# Patient Record
Sex: MALE | Race: WHITE | NOT HISPANIC OR LATINO | URBAN - METROPOLITAN AREA
[De-identification: names, ages, dates, MRNs, and addresses within clinical notes are randomized per-mention and may not be internally consistent; named-entity substitution may affect disease eponyms.]

---

## 2020-04-02 ENCOUNTER — INPATIENT (INPATIENT)
Facility: HOSPITAL | Age: 80
LOS: 21 days | Discharge: HOME | DRG: 871 | End: 2020-04-24
Attending: INTERNAL MEDICINE | Admitting: INTERNAL MEDICINE
Payer: MEDICARE

## 2020-04-02 VITALS
DIASTOLIC BLOOD PRESSURE: 71 MMHG | RESPIRATION RATE: 28 BRPM | OXYGEN SATURATION: 86 % | SYSTOLIC BLOOD PRESSURE: 116 MMHG | HEIGHT: 74 IN | WEIGHT: 195.11 LBS | TEMPERATURE: 98 F | HEART RATE: 130 BPM

## 2020-04-02 LAB
ALBUMIN SERPL ELPH-MCNC: 3.1 G/DL — LOW (ref 3.3–5)
ALP SERPL-CCNC: 47 U/L — SIGNIFICANT CHANGE UP (ref 40–120)
ALT FLD-CCNC: 50 U/L — HIGH (ref 10–45)
ANION GAP SERPL CALC-SCNC: 17 MMOL/L — SIGNIFICANT CHANGE UP (ref 5–17)
ANISOCYTOSIS BLD QL: SLIGHT — SIGNIFICANT CHANGE UP
APPEARANCE UR: CLEAR — SIGNIFICANT CHANGE UP
APTT BLD: 33.8 SEC — SIGNIFICANT CHANGE UP (ref 27.5–36.3)
AST SERPL-CCNC: 73 U/L — HIGH (ref 10–40)
BACTERIA # UR AUTO: PRESENT /HPF
BASE EXCESS BLDA CALC-SCNC: -4.6 MMOL/L — LOW (ref -2–3)
BASE EXCESS BLDV CALC-SCNC: -1.2 MMOL/L — SIGNIFICANT CHANGE UP
BASOPHILS # BLD AUTO: 0.01 K/UL — SIGNIFICANT CHANGE UP (ref 0–0.2)
BASOPHILS NFR BLD AUTO: 0.2 % — SIGNIFICANT CHANGE UP (ref 0–2)
BILIRUB SERPL-MCNC: 0.4 MG/DL — SIGNIFICANT CHANGE UP (ref 0.2–1.2)
BILIRUB UR-MCNC: NEGATIVE — SIGNIFICANT CHANGE UP
BUN SERPL-MCNC: 17 MG/DL — SIGNIFICANT CHANGE UP (ref 7–23)
BURR CELLS BLD QL SMEAR: PRESENT — SIGNIFICANT CHANGE UP
CALCIUM SERPL-MCNC: 8.6 MG/DL — SIGNIFICANT CHANGE UP (ref 8.4–10.5)
CHLORIDE SERPL-SCNC: 96 MMOL/L — SIGNIFICANT CHANGE UP (ref 96–108)
CO2 SERPL-SCNC: 20 MMOL/L — LOW (ref 22–31)
COLOR SPEC: YELLOW — SIGNIFICANT CHANGE UP
COMMENT - URINE: SIGNIFICANT CHANGE UP
CREAT SERPL-MCNC: 1.26 MG/DL — SIGNIFICANT CHANGE UP (ref 0.5–1.3)
CRP SERPL-MCNC: 29.04 MG/DL — HIGH (ref 0–0.4)
D DIMER BLD IA.RAPID-MCNC: 486 NG/ML DDU — HIGH
DIFF PNL FLD: ABNORMAL
EOSINOPHIL # BLD AUTO: 0 K/UL — SIGNIFICANT CHANGE UP (ref 0–0.5)
EOSINOPHIL NFR BLD AUTO: 0 % — SIGNIFICANT CHANGE UP (ref 0–6)
EPI CELLS # UR: SIGNIFICANT CHANGE UP /HPF (ref 0–5)
FERRITIN SERPL-MCNC: 644 NG/ML — HIGH (ref 30–400)
GLUCOSE BLDC GLUCOMTR-MCNC: 136 MG/DL — HIGH (ref 70–99)
GLUCOSE BLDC GLUCOMTR-MCNC: 158 MG/DL — HIGH (ref 70–99)
GLUCOSE BLDC GLUCOMTR-MCNC: 203 MG/DL — HIGH (ref 70–99)
GLUCOSE SERPL-MCNC: 127 MG/DL — HIGH (ref 70–99)
GLUCOSE UR QL: NEGATIVE — SIGNIFICANT CHANGE UP
HCO3 BLDA-SCNC: 21 MMOL/L — SIGNIFICANT CHANGE UP (ref 21–28)
HCO3 BLDV-SCNC: 22 MMOL/L — SIGNIFICANT CHANGE UP (ref 20–27)
HCT VFR BLD CALC: 41.1 % — SIGNIFICANT CHANGE UP (ref 39–50)
HGB BLD-MCNC: 13.9 G/DL — SIGNIFICANT CHANGE UP (ref 13–17)
HYALINE CASTS # UR AUTO: SIGNIFICANT CHANGE UP /LPF (ref 0–2)
IMM GRANULOCYTES NFR BLD AUTO: 0.8 % — SIGNIFICANT CHANGE UP (ref 0–1.5)
INR BLD: 1.39 — HIGH (ref 0.88–1.16)
KETONES UR-MCNC: ABNORMAL MG/DL
LEUKOCYTE ESTERASE UR-ACNC: NEGATIVE — SIGNIFICANT CHANGE UP
LYMPHOCYTES # BLD AUTO: 0.48 K/UL — LOW (ref 1–3.3)
LYMPHOCYTES # BLD AUTO: 7.8 % — LOW (ref 13–44)
MANUAL SMEAR VERIFICATION: SIGNIFICANT CHANGE UP
MCHC RBC-ENTMCNC: 29 PG — SIGNIFICANT CHANGE UP (ref 27–34)
MCHC RBC-ENTMCNC: 33.8 GM/DL — SIGNIFICANT CHANGE UP (ref 32–36)
MCV RBC AUTO: 85.8 FL — SIGNIFICANT CHANGE UP (ref 80–100)
MONOCYTES # BLD AUTO: 0.55 K/UL — SIGNIFICANT CHANGE UP (ref 0–0.9)
MONOCYTES NFR BLD AUTO: 8.9 % — SIGNIFICANT CHANGE UP (ref 2–14)
NEUTROPHILS # BLD AUTO: 5.09 K/UL — SIGNIFICANT CHANGE UP (ref 1.8–7.4)
NEUTROPHILS NFR BLD AUTO: 82.3 % — HIGH (ref 43–77)
NITRITE UR-MCNC: NEGATIVE — SIGNIFICANT CHANGE UP
NRBC # BLD: 0 /100 WBCS — SIGNIFICANT CHANGE UP (ref 0–0)
OVALOCYTES BLD QL SMEAR: SLIGHT — SIGNIFICANT CHANGE UP
PCO2 BLDA: 43 MMHG — SIGNIFICANT CHANGE UP (ref 35–48)
PCO2 BLDV: 34 MMHG — LOW (ref 41–51)
PH BLDA: 7.32 — LOW (ref 7.35–7.45)
PH BLDV: 7.43 — SIGNIFICANT CHANGE UP (ref 7.32–7.43)
PH UR: 6 — SIGNIFICANT CHANGE UP (ref 5–8)
PLAT MORPH BLD: NORMAL — SIGNIFICANT CHANGE UP
PLATELET # BLD AUTO: 131 K/UL — LOW (ref 150–400)
PO2 BLDA: 100 MMHG — SIGNIFICANT CHANGE UP (ref 83–108)
PO2 BLDV: 20 MMHG — SIGNIFICANT CHANGE UP
POTASSIUM SERPL-MCNC: 4 MMOL/L — SIGNIFICANT CHANGE UP (ref 3.5–5.3)
POTASSIUM SERPL-SCNC: 4 MMOL/L — SIGNIFICANT CHANGE UP (ref 3.5–5.3)
PROCALCITONIN SERPL-MCNC: 1.85 NG/ML — HIGH (ref 0.02–0.1)
PROT SERPL-MCNC: 7.5 G/DL — SIGNIFICANT CHANGE UP (ref 6–8.3)
PROT UR-MCNC: >=300 MG/DL
PROTHROM AB SERPL-ACNC: 16 SEC — HIGH (ref 10–12.9)
RBC # BLD: 4.79 M/UL — SIGNIFICANT CHANGE UP (ref 4.2–5.8)
RBC # FLD: 14.5 % — SIGNIFICANT CHANGE UP (ref 10.3–14.5)
RBC BLD AUTO: ABNORMAL
RBC CASTS # UR COMP ASSIST: < 5 /HPF — SIGNIFICANT CHANGE UP
SAO2 % BLDA: 98 % — SIGNIFICANT CHANGE UP (ref 95–100)
SAO2 % BLDV: 32 % — SIGNIFICANT CHANGE UP
SARS-COV-2 RNA SPEC QL NAA+PROBE: DETECTED
SODIUM SERPL-SCNC: 133 MMOL/L — LOW (ref 135–145)
SP GR SPEC: >=1.03 — SIGNIFICANT CHANGE UP (ref 1–1.03)
UROBILINOGEN FLD QL: 0.2 E.U./DL — SIGNIFICANT CHANGE UP
WBC # BLD: 6.18 K/UL — SIGNIFICANT CHANGE UP (ref 3.8–10.5)
WBC # FLD AUTO: 6.18 K/UL — SIGNIFICANT CHANGE UP (ref 3.8–10.5)
WBC UR QL: < 5 /HPF — SIGNIFICANT CHANGE UP

## 2020-04-02 PROCEDURE — 71045 X-RAY EXAM CHEST 1 VIEW: CPT | Mod: 26

## 2020-04-02 PROCEDURE — 93010 ELECTROCARDIOGRAM REPORT: CPT

## 2020-04-02 PROCEDURE — 93010 ELECTROCARDIOGRAM REPORT: CPT | Mod: XU

## 2020-04-02 PROCEDURE — 99291 CRITICAL CARE FIRST HOUR: CPT | Mod: 25

## 2020-04-02 PROCEDURE — 71045 X-RAY EXAM CHEST 1 VIEW: CPT | Mod: 26,77

## 2020-04-02 PROCEDURE — 31500 INSERT EMERGENCY AIRWAY: CPT

## 2020-04-02 RX ORDER — MIDAZOLAM HYDROCHLORIDE 1 MG/ML
5 INJECTION, SOLUTION INTRAMUSCULAR; INTRAVENOUS ONCE
Refills: 0 | Status: DISCONTINUED | OUTPATIENT
Start: 2020-04-02 | End: 2020-04-02

## 2020-04-02 RX ORDER — NOREPINEPHRINE BITARTRATE/D5W 8 MG/250ML
0.05 PLASTIC BAG, INJECTION (ML) INTRAVENOUS
Qty: 8 | Refills: 0 | Status: DISCONTINUED | OUTPATIENT
Start: 2020-04-02 | End: 2020-04-04

## 2020-04-02 RX ORDER — DEXMEDETOMIDINE HYDROCHLORIDE IN 0.9% SODIUM CHLORIDE 4 UG/ML
0.2 INJECTION INTRAVENOUS
Qty: 200 | Refills: 0 | Status: DISCONTINUED | OUTPATIENT
Start: 2020-04-02 | End: 2020-04-04

## 2020-04-02 RX ORDER — AZITHROMYCIN 500 MG/1
500 TABLET, FILM COATED ORAL ONCE
Refills: 0 | Status: COMPLETED | OUTPATIENT
Start: 2020-04-02 | End: 2020-04-02

## 2020-04-02 RX ORDER — ASCORBIC ACID 60 MG
1500 TABLET,CHEWABLE ORAL ONCE
Refills: 0 | Status: DISCONTINUED | OUTPATIENT
Start: 2020-04-02 | End: 2020-04-02

## 2020-04-02 RX ORDER — THIAMINE MONONITRATE (VIT B1) 100 MG
200 TABLET ORAL
Refills: 0 | Status: DISCONTINUED | OUTPATIENT
Start: 2020-04-02 | End: 2020-04-02

## 2020-04-02 RX ORDER — PANTOPRAZOLE SODIUM 20 MG/1
40 TABLET, DELAYED RELEASE ORAL DAILY
Refills: 0 | Status: DISCONTINUED | OUTPATIENT
Start: 2020-04-02 | End: 2020-04-04

## 2020-04-02 RX ORDER — INSULIN LISPRO 100/ML
VIAL (ML) SUBCUTANEOUS EVERY 6 HOURS
Refills: 0 | Status: DISCONTINUED | OUTPATIENT
Start: 2020-04-02 | End: 2020-04-05

## 2020-04-02 RX ORDER — ASCORBIC ACID 60 MG
1500 TABLET,CHEWABLE ORAL ONCE
Refills: 0 | Status: COMPLETED | OUTPATIENT
Start: 2020-04-02 | End: 2020-04-02

## 2020-04-02 RX ORDER — SUCCINYLCHOLINE CHLORIDE 100 MG/5ML
100 SYRINGE (ML) INTRAVENOUS ONCE
Refills: 0 | Status: COMPLETED | OUTPATIENT
Start: 2020-04-02 | End: 2020-04-02

## 2020-04-02 RX ORDER — TOCILIZUMAB 20 MG/ML
400 INJECTION, SOLUTION, CONCENTRATE INTRAVENOUS ONCE
Refills: 0 | Status: COMPLETED | OUTPATIENT
Start: 2020-04-02 | End: 2020-04-02

## 2020-04-02 RX ORDER — FENTANYL CITRATE 50 UG/ML
100 INJECTION INTRAVENOUS
Refills: 0 | Status: DISCONTINUED | OUTPATIENT
Start: 2020-04-02 | End: 2020-04-02

## 2020-04-02 RX ORDER — CHLORHEXIDINE GLUCONATE 213 G/1000ML
1 SOLUTION TOPICAL
Refills: 0 | Status: DISCONTINUED | OUTPATIENT
Start: 2020-04-02 | End: 2020-04-09

## 2020-04-02 RX ORDER — CHLORHEXIDINE GLUCONATE 213 G/1000ML
15 SOLUTION TOPICAL EVERY 12 HOURS
Refills: 0 | Status: DISCONTINUED | OUTPATIENT
Start: 2020-04-02 | End: 2020-04-04

## 2020-04-02 RX ORDER — THIAMINE MONONITRATE (VIT B1) 100 MG
200 TABLET ORAL ONCE
Refills: 0 | Status: COMPLETED | OUTPATIENT
Start: 2020-04-02 | End: 2020-04-02

## 2020-04-02 RX ORDER — FENTANYL CITRATE 50 UG/ML
50 INJECTION INTRAVENOUS
Refills: 0 | Status: DISCONTINUED | OUTPATIENT
Start: 2020-04-02 | End: 2020-04-02

## 2020-04-02 RX ORDER — PROPOFOL 10 MG/ML
100 INJECTION, EMULSION INTRAVENOUS ONCE
Refills: 0 | Status: COMPLETED | OUTPATIENT
Start: 2020-04-02 | End: 2020-04-02

## 2020-04-02 RX ORDER — ASCORBIC ACID 60 MG
1500 TABLET,CHEWABLE ORAL
Refills: 0 | Status: DISCONTINUED | OUTPATIENT
Start: 2020-04-02 | End: 2020-04-02

## 2020-04-02 RX ORDER — CEFTRIAXONE 500 MG/1
1000 INJECTION, POWDER, FOR SOLUTION INTRAMUSCULAR; INTRAVENOUS ONCE
Refills: 0 | Status: COMPLETED | OUTPATIENT
Start: 2020-04-02 | End: 2020-04-02

## 2020-04-02 RX ORDER — THIAMINE MONONITRATE (VIT B1) 100 MG
200 TABLET ORAL ONCE
Refills: 0 | Status: DISCONTINUED | OUTPATIENT
Start: 2020-04-02 | End: 2020-04-02

## 2020-04-02 RX ORDER — ETOMIDATE 2 MG/ML
20 INJECTION INTRAVENOUS ONCE
Refills: 0 | Status: COMPLETED | OUTPATIENT
Start: 2020-04-02 | End: 2020-04-02

## 2020-04-02 RX ORDER — PROPOFOL 10 MG/ML
9.42 INJECTION, EMULSION INTRAVENOUS
Qty: 1000 | Refills: 0 | Status: DISCONTINUED | OUTPATIENT
Start: 2020-04-02 | End: 2020-04-04

## 2020-04-02 RX ORDER — ENOXAPARIN SODIUM 100 MG/ML
80 INJECTION SUBCUTANEOUS EVERY 12 HOURS
Refills: 0 | Status: DISCONTINUED | OUTPATIENT
Start: 2020-04-02 | End: 2020-04-23

## 2020-04-02 RX ORDER — ACETAMINOPHEN 500 MG
650 TABLET ORAL EVERY 6 HOURS
Refills: 0 | Status: DISCONTINUED | OUTPATIENT
Start: 2020-04-02 | End: 2020-04-03

## 2020-04-02 RX ADMIN — CHLORHEXIDINE GLUCONATE 15 MILLILITER(S): 213 SOLUTION TOPICAL at 18:59

## 2020-04-02 RX ADMIN — Medication 102 MILLIGRAM(S): at 11:00

## 2020-04-02 RX ADMIN — FENTANYL CITRATE 100 MICROGRAM(S): 50 INJECTION INTRAVENOUS at 17:00

## 2020-04-02 RX ADMIN — Medication 40 MILLIGRAM(S): at 18:59

## 2020-04-02 RX ADMIN — TOCILIZUMAB 100 MILLIGRAM(S): 20 INJECTION, SOLUTION, CONCENTRATE INTRAVENOUS at 17:00

## 2020-04-02 RX ADMIN — Medication 8.3 MICROGRAM(S)/KG/MIN: at 20:27

## 2020-04-02 RX ADMIN — DEXMEDETOMIDINE HYDROCHLORIDE IN 0.9% SODIUM CHLORIDE 4.43 MICROGRAM(S)/KG/HR: 4 INJECTION INTRAVENOUS at 19:32

## 2020-04-02 RX ADMIN — PROPOFOL 100 MILLIGRAM(S): 10 INJECTION, EMULSION INTRAVENOUS at 11:30

## 2020-04-02 RX ADMIN — PROPOFOL 5 MICROGRAM(S)/KG/MIN: 10 INJECTION, EMULSION INTRAVENOUS at 10:48

## 2020-04-02 RX ADMIN — Medication 200 MILLIGRAM(S): at 11:45

## 2020-04-02 RX ADMIN — Medication 4: at 23:23

## 2020-04-02 RX ADMIN — Medication 8.3 MICROGRAM(S)/KG/MIN: at 10:48

## 2020-04-02 RX ADMIN — Medication 650 MILLIGRAM(S): at 22:59

## 2020-04-02 RX ADMIN — Medication 100 MILLIGRAM(S): at 11:00

## 2020-04-02 RX ADMIN — MIDAZOLAM HYDROCHLORIDE 5 MILLIGRAM(S): 1 INJECTION, SOLUTION INTRAMUSCULAR; INTRAVENOUS at 11:30

## 2020-04-02 RX ADMIN — CEFTRIAXONE 100 MILLIGRAM(S): 500 INJECTION, POWDER, FOR SOLUTION INTRAMUSCULAR; INTRAVENOUS at 10:59

## 2020-04-02 RX ADMIN — AZITHROMYCIN 255 MILLIGRAM(S): 500 TABLET, FILM COATED ORAL at 11:11

## 2020-04-02 RX ADMIN — DEXMEDETOMIDINE HYDROCHLORIDE IN 0.9% SODIUM CHLORIDE 4.43 MICROGRAM(S)/KG/HR: 4 INJECTION INTRAVENOUS at 23:24

## 2020-04-02 RX ADMIN — Medication 153 MILLIGRAM(S): at 11:11

## 2020-04-02 RX ADMIN — ENOXAPARIN SODIUM 80 MILLIGRAM(S): 100 INJECTION SUBCUTANEOUS at 18:59

## 2020-04-02 RX ADMIN — FENTANYL CITRATE 100 MICROGRAM(S): 50 INJECTION INTRAVENOUS at 17:42

## 2020-04-02 RX ADMIN — ETOMIDATE 20 MILLIGRAM(S): 2 INJECTION INTRAVENOUS at 11:00

## 2020-04-02 RX ADMIN — PROPOFOL 5 MICROGRAM(S)/KG/MIN: 10 INJECTION, EMULSION INTRAVENOUS at 23:24

## 2020-04-02 RX ADMIN — PROPOFOL 5 MICROGRAM(S)/KG/MIN: 10 INJECTION, EMULSION INTRAVENOUS at 14:00

## 2020-04-02 RX ADMIN — PANTOPRAZOLE SODIUM 40 MILLIGRAM(S): 20 TABLET, DELAYED RELEASE ORAL at 12:45

## 2020-04-02 RX ADMIN — CEFTRIAXONE 1000 MILLIGRAM(S): 500 INJECTION, POWDER, FOR SOLUTION INTRAMUSCULAR; INTRAVENOUS at 11:45

## 2020-04-02 RX ADMIN — MIDAZOLAM HYDROCHLORIDE 5 MILLIGRAM(S): 1 INJECTION, SOLUTION INTRAMUSCULAR; INTRAVENOUS at 10:56

## 2020-04-02 NOTE — H&P ADULT - HISTORY OF PRESENT ILLNESS
HPI:  80M   taking pulse oxy at home-   at [facility] ED- intubated in ED for hypoxic respiratory failure, Afib with RVR (new afib),     Date of onset of fever: Tuesday- thurs  Date of onset of dyspnea:  Recent Travel:  Sick Contacts:  COVID Exposure:   Close Contacts:    ROS:  Fevers: Y/N  Malaise: Y/N  Myalgias: Y/N  SOB: Y/N          At rest: Y/N         With exertion: Y/N         At baseline: Y/ N  Cough: Y/N         Productive: Y/N  Nausea: Y/N  Vomiting: Y/N  Diarrhea: Y/N    PMHx:   HTN: Y/N  DM: Y/N  Lung Disease: Y/N       Specify:  Cardiovascular disease: Y/N  Malignancy: Y/N  Immunosuppression: Y/N  HIV: Y/N  CKD/ESRD: Y/N  Chronic Liver Disease: Y/N 79 y/o M with HTN, HLD, and BPH presenting with fevers and cough of 7-8 days duration, and diarrhea of one days duration. Patient notified PCP (Dr. Nunez) of fevers and cough on ___. Per wife, patient Temperatures 101-105, managed with Tylenol  taking pulse oxy at home-   at [facility] ED- intubated in ED for hypoxic respiratory failure, Afib with RVR (new afib),     Date of onset of fever: 3/28-3/29 (T: 102-105)  Date of onset of dyspnea: denies  Recent Travel: [place] 2/16  Sick Contacts: None  COVID Exposure: None  Close Contacts: None    ROS (per wife and PCP):   Fevers: Y  Malaise: Y  Myalgias: N  SOB: N          At rest: N         With exertion: N         At baseline: N  Cough: Y         Productive: unclear   Nausea: N  Vomiting: N  Diarrhea: Y (1 days duration)    PMHx:   HTN: Y  DM: N  Lung Disease: N       Specify:  Cardiovascular disease: N  Malignancy: N  Immunosuppression: N  HIV: N  CKD/ESRD: N  Chronic Liver Disease: N 79 y/o M with HTN, HLD, and BPH presenting with fevers and cough of 7-8 days duration, and diarrhea of one days duration. Patient notified PCP (Dr. Nunez) of fevers and cough on ___. Per wife, patient Temperatures 101-105, managed with Tylenol. PCP aware and informed and prescribed azithromycin and hydroxychloroquine (3/29), per wife completed by patient. Patient purchased pulse-ox was saturating 95-96% on Monday, however without improvement in symptoms and was re-prescribed hydroxychloroquine, but did not take any extra doses. Patient began having uncontrollable diarrhea one day prior to presentation. This morning patient saturating   taking pulse oxy at home-   at [facility] ED- intubated in ED for hypoxic respiratory failure, Afib with RVR (new afib),     Date of onset of fever: 3/28-3/29 (T: 102-105)  Date of onset of dyspnea: denies  Recent Travel: [place] 2/16  Sick Contacts: None  COVID Exposure: None  Close Contacts: None    ROS (per wife and PCP):   Fevers: Y  Malaise: Y  Myalgias: N  SOB: N          At rest: N         With exertion: N         At baseline: N  Cough: Y         Productive: unclear   Nausea: N  Vomiting: N  Diarrhea: Y (1 days duration)    PMHx:   HTN: Y  DM: N  Lung Disease: N       Specify:  Cardiovascular disease: N  Malignancy: N  Immunosuppression: N  HIV: N  CKD/ESRD: N  Chronic Liver Disease: N 81 y/o M with HTN, HLD, and BPH presenting with fevers and cough of 7-8 days duration, and diarrhea of one days duration. Patient notified PCP (Dr. Nunez) of fevers and cough on 3/27-3/28. Per wife, patient Temperatures 101-105, managed with Tylenol. PCP aware and informed and prescribed azithromycin and hydroxychloroquine (3/29), per wife completed by patient. Patient purchased pulse-ox was saturating 95-96% on Monday, however without improvement in symptoms and was re-prescribed hydroxychloroquine, but did not take any extra doses. Patient began having uncontrollable diarrhea one day prior to presentation. This morning patient saturating 84-86%, PCP instructed patient to report to ED, intubated on arrival for hypoxic respiratory failure and noted to be in new onset atrial fibrillation with RVR. No known sick contacts. Travelled to [facility] 2/16. Per wife and PCP denies SOB, abdominal pain, nausea, vomiting or palpations.   ROS     Date of onset of fever: 3/28-3/29 (T: 102-105)  Date of onset of dyspnea: denies  Recent Travel: [facility] 2/16  Sick Contacts: None  COVID Exposure: None  Close Contacts: None    ROS (per wife and PCP):   Fevers: Y  Malaise: Y  Myalgias: N  SOB: N          At rest: N         With exertion: N         At baseline: N  Cough: Y         Productive: unclear   Nausea: N  Vomiting: N  Diarrhea: Y (1 days duration)    PMHx:   HTN: Y  DM: N  Lung Disease: N       Specify:  Cardiovascular disease: N  Malignancy: N  Immunosuppression: N  HIV: N  CKD/ESRD: N  Chronic Liver Disease: N 79 y/o M with HTN, HLD, and BPH presenting with fevers and cough of 7-8 days duration, and diarrhea of one days duration. Patient notified PCP (Dr. Nunez) of fevers and cough on 3/27-3/28. Per wife, patient Temperatures 101-105, managed with Tylenol. PCP aware and informed and prescribed azithromycin and hydroxychloroquine (3/29), per wife completed by patient. Patient purchased pulse-ox was saturating 95-96% on Monday, however without improvement in symptoms and was re-prescribed hydroxychloroquine, but did not take any extra doses. Patient began having uncontrollable diarrhea one day prior to presentation. This morning patient saturating 84-86%, PCP instructed patient to report to ED, intubated on arrival for hypoxic respiratory failure and noted to be in new onset atrial fibrillation with RVR. No known sick contacts. Travelled to [facility] 2/16. Per wife and PCP denies SOB, abdominal pain, nausea, vomiting or palpations. ROS positive for fever, cough, and diarrhea.     Date of onset of fever: 3/28-3/29 (T: 102F-105F)  Date of onset of dyspnea: denies  Recent Travel: [facility] 2/16  Sick Contacts: None  COVID Exposure: None  Close Contacts: None    ROS (per wife and PCP):   Fevers: Y  Malaise: Y  Myalgias: N  SOB: N          At rest: N         With exertion: N         At baseline: N  Cough: Y         Productive: unclear   Nausea: N  Vomiting: N  Diarrhea: Y (1 days duration)    PMHx:   HTN: Y  DM: N  Lung Disease: N       Specify:  Cardiovascular disease: N  Malignancy: N  Immunosuppression: N  HIV: N  CKD/ESRD: N  Chronic Liver Disease: N      In ED:

## 2020-04-02 NOTE — H&P ADULT - ASSESSMENT
81 yo M h/o BPH, HLD first had fever on 3/31 started plaquenil and azithro then had 02 sat in low 80s and came to ED then intubated. Found to have new Afib with RVR.     Neuro: propofol gtt, fentanyl gtt, precedex gtt  CV: levophed gtt for MAP goal > 65  Pulm: /60/18/10  GI/FEN: Dobhoff  :Romain  ID: covid: completed azithro/plaquenil, solumedrol (4/2-4/7) Toci (4/2)  Endo: ISS  Heme: Afib lovenox  PPX: SCDs  Lines: [facility] TLC (4/2-) Yanely (4/2-)  PT/OT:not ordered 81 yo M h/o BPH, HLD first had fever on 3/31 started plaquenil and azithro then had 02 sat in low 80s and came to ED then intubated. Found to have new Afib with RVR.     NEUROLOGY  #Intubated  Patient intubated on arrival due to hypoxic respiratory failure   - c/w    RESPIRATORY  #Acute Hypoxic Respiratory failure 2/2 COVID-19     CARDIOVASCULAR  #Hemodynamic  Patient euvolemic on exam.  - judicious fluid utilization in setting of COVID and concern for impending ARDS  - currently requiring levophed in setting of sedation   - monitor urine output  - MAP goal >65     #Hypertension  Patient with history of hypertension, well controlled at home.  - on home metoprolol 12.5 BID, held inpatient   - continue to monitor    RENAL  #ALL    INFECTIOUS DISEASE  #COVID-19    GI  #Prophylaxis     RENAL  #ALL      #BPH  - conte in place (4/2)  - monitor urinary output       Prophylaxis  58 y/o M w/ PMHx of HTN and ETOH abuse (4 drinks per day, last drink 2 weeks ago) presents with progressive shortness of breath admitted for hypoxic respiratory failure 2/2 COVID-19 requiring intubation on 3/24 and management in the ICU, now s/p extubation, stable for step down to COVID-tele.     NEUROLOGY:  #s/p Extubation  Patient intubated 2/2 acute hypoxic respiratory failure 2/2 COVID-19  - now s/p extubation (4/2)  - following commands  - left sided twitch noted on exam   - Seroquel decreased to 12.5mg qd     PULMONARY:   #Acute Hypoxic Respiratory Failure 2/2 COVID  Pt initially admitted to [facility] for management of multifocal alveolar pneumonitis 2/2 CoVID, later found to have increased work of breathing and hypoxemia requiring intubation 3/24. Respiratory failure 2/2 CoVID-19. ABG s/p intubation 7.38, 36, 106, 21, 98%.  - Extubated 4/3, saturating 93-97% on 4L NC  - COVID management per below     INFECTIOUS DISEASE:  #COVID-19  Pt positive for COVID-19 per results from [facility], confirmed positive inpatient 3/23. CXR with bilateral infiltrates CT chest findings showing severe multifocal alveolar pneumonitis consistent with COVID-19. Now with downtrending leukocytosis and downtrending inflammatory biomarkers (D-dimer, CRP). Stable procalcitonin.   - s/p COVID bundle: azithromycin, hydroxychloroquine, thiamine, ascorbic acid (3/22-3/27)  - azithromycin (3/22-3/28) prolonged for anti-inflammatory effects  - s/p Tocilizumab 400mg IV (3/25)  - s/p solumedrol 40mg IV BID (3/28-4/1)  - Blood cx (3/25, 3/30, 2/2) NGTD   - Sputum cx with normal respiratory brianna   - Tylenol PRN for fever  - Robitussin PRN for cough  - Avoid NSAIDs, ACE/ARB  - no nebulizers, MDI only  - COVID Isolation precautions: contact and droplet   - continue to monitor daily CBC w/ differential, CMP, Mg, Phos, CRP, procalcitonin, and ABG  - q3d labs: ESR, Tcell subset, d-dimer, LDH, ferritin, CK, trop, coags    #Sepsis 2/2 COVID vs possible aspiration pneumonia  2/4 SIRS criteria on admission (tachycardia, febrile). CXR w/ bilateral patchy opacities. CT chest with peripheral ground glass opacities. Persistent fevers may be in the setting of B/L DVTs. However, patient with persistent fevers, concern for aspiration pneumonia vs VAP. MRSA swab negative  - c/w Zosyn 4.5mg q6hr (, 7 days total)  - Blood, urine and sputum cultures NGTD   - COVID management as above    GASTROINTESTINAL  #Nutrition   - Failed bedside dysphagia s/p extubation, will reassess 4/3.   - Previously on Protonix 40mg BID while intubated, transition to Protonix 40mg PO  - NGT in place  - FSG q6hrs to monitor for hypo/hyperglycemia. goal glucose <180    RENAL:  #ALL vs ATN diuresis 2/2 COVID   Cr increased from 1.17 to 2.44 likely pre-renal from diuresis, worsened with lasix. Cr 0.77 (4/3)  - Cr improving, with good urinary output   - continue to monitor     HEME/ONC  #DVT   Patient with rising d-dimers in setting of persistent fevers. Bilateral lower extremity duplex demonstrating right femoral vein with partially occlusive thrombus, with occlusive thrombus within the right posterior tibial, peroneal, and intramuscular calf veins and right popliteal vein. Also with occlusive thrombus within the proximal left deep femoral vein.  - Previously on hep gtt transitioned to Lovenox 80mg BID     GENITOURINARY  #Conte placement  Conte placed (4/3)     CARDIOVASCULAR: TAWANDA    F: none  E: replete to K>4, Mg>2  N: NPO  Lines: PIV,  Conte (4/2)     PPx:    DVT: hep subq     GI: protonix IVP    Code status: FULL CODE     Dispo: MICU-COVID 79 yo M h/o BPH, HLD first had fever on 3/31 started plaquenil and azithro then had 02 sat in low 80s and came to ED then intubated. Found to have new Afib with RVR.     NEUROLOGY  #Intubated  Patient intubated on arrival due to hypoxic respiratory failure   - c/w vent setting     RESPIRATORY  #Acute Hypoxic Respiratory failure 2/2 COVID-19   Patient intubated on arrival. Completed azithromycin and hydroxychloroquine outpatient.   - s/p tociluzumab 4/2  - c/w methylprednisolone 40mg IV x5 days (4/2-4/6)     CARDIOVASCULAR  #Hemodynamic  Patient euvolemic on exam.  - judicious fluid utilization in setting of COVID and concern for impending ARDS  - currently requiring levophed in setting of sedation   - monitor urine output  - MAP goal >65     #Hypertension  Patient with history of hypertension, well controlled at home.  - on home metoprolol 12.5 BID, held inpatient   - continue to monitor    RENAL  #ALL  Patient with baseline Cr 0.9, Cr 1.21 on admission.  - likely prerenal in setting of diarrhea   - continue to monitor      INFECTIOUS DISEASE:  #COVID-19  Pt positive for COVID-19. CXR with bilateral infiltrates  Now with downtrending leukocytosis and downtrending inflammatory biomarkers (D-dimer, CRP). Stable procalcitonin.   - s/p COVID bundle: azithromycin, hydroxychloroquine outpatient  - s/p tociluzumab (4/2)   - c/w methylprednisolone 40mg IV BID (4/2-4/6)   - Blood cx  NGTD   - Tylenol PRN for fever  - Robitussin PRN for cough  - Avoid NSAIDs, ACE/ARB  - no nebulizers, MDI only  - COVID Isolation precautions: contact and droplet   - continue to monitor daily CBC w/ differential, CMP, Mg, Phos, CRP, procalcitonin, and ABG  - q3d labs: ESR, Tcell subset, d-dimer, LDH, ferritin, CK, trop, coags      GI  #Prophylaxis   - pantoprazole 40mg IV      #BPH  Patient with history of BPH, on home tamsulosin   - conte in place (4/2)  - monitor urinary output     F: none  E: replete to K>4, Mg>2  N: NPO   Lines: PIV,  Conte (4/2)     PPx:    DVT: hep subq     GI: protonix IVP    Code status: FULL CODE     Dispo: MICU-COVID

## 2020-04-02 NOTE — ED PROVIDER NOTE - CONSTITUTIONAL, MLM
normal... speaking in full sentences. , awake, alert, oriented to person, place, time/situation and in no apparent distress. speaking in full sentences. , awake, alert, oriented to person, place, time/situation and resp distress w tachypnea and increased wob

## 2020-04-02 NOTE — ED ADULT NURSE NOTE - NS ED NURSE TRANSPORT WITH
Cardiac Monitor/Defib/ACLS/Rescue Kit/O2/BVM/IV pump/pulse ox/ventilator/ACLS Rescue Kit/drains/oxygen/conte

## 2020-04-02 NOTE — ED PROVIDER NOTE - RESPIRATORY, MLM
tachypnea in 60s in respiratory distress tachypnea in 60s in respiratory distress, lung decreased bs bilat bases L > R

## 2020-04-02 NOTE — ED PROVIDER NOTE - CRITICAL CARE PROVIDED
additional history taking/consultation with other physicians/documentation/consult w/ pt's family directly relating to pts condition/direct patient care (not related to procedure)/interpretation of diagnostic studies

## 2020-04-02 NOTE — ED PROVIDER NOTE - DIAGNOSTIC INTERPRETATION
b/l infiltrate L>R. O2 on ventilation. nml heart and bones ER Physician:  Soni Director  CHEST XRAY INTERPRETATION: lungs bilat infiltrates L > R, ett and og in good position, heart shadow normal, bony structures intact

## 2020-04-02 NOTE — ED PROVIDER NOTE - CLINICAL SUMMARY MEDICAL DECISION MAKING FREE TEXT BOX
81 y/o M arrived in respiratory distress. o2 stat 91% on a non rebreather. Discussed inhibitation with pt and wife. Plan to admit for COVID. PMD Dr. Vinay hager and discussed with ICU for admission. 81 y/o M w known covid c/o sob, low sat at home, in respiratory distress. o2 sat 91% on a non rebreather, rr 60's. Discussed intubation with pt and wife. Pt intubated.  Plan labs, cxr, admit to ICU for COVID and resp failure. PMD Dr. Nunez updated.

## 2020-04-02 NOTE — H&P ADULT - NSHPLABSRESULTS_GEN_ALL_CORE
LABS:                        13.9   6.18  )-----------( 131      ( 02 Apr 2020 10:40 )             41.1     04-02    133<L>  |  96  |  17  ----------------------------<  127<H>  4.0   |  20<L>  |  1.26    Ca    8.6      02 Apr 2020 10:40    TPro  7.5  /  Alb  3.1<L>  /  TBili  0.4  /  DBili  x   /  AST  73<H>  /  ALT  50<H>  /  AlkPhos  47  04-02    PT/INR - ( 02 Apr 2020 10:40 )   PT: 16.0 sec;   INR: 1.39          PTT - ( 02 Apr 2020 10:40 )  PTT:33.8 sec  Urinalysis Basic - ( 02 Apr 2020 11:12 )    Color: Yellow / Appearance: Clear / SG: >=1.030 / pH: x  Gluc: x / Ketone: Trace mg/dL  / Bili: Negative / Urobili: 0.2 E.U./dL   Blood: x / Protein: >=300 mg/dL / Nitrite: NEGATIVE   Leuk Esterase: NEGATIVE / RBC: < 5 /HPF / WBC < 5 /HPF   Sq Epi: x / Non Sq Epi: 0-5 /HPF / Bacteria: Present /HPF

## 2020-04-02 NOTE — PROCEDURE NOTE - NSPROCDETAILS_GEN_ALL_CORE
ultrasound guidance/guidewire recovered/lumen(s) aspirated and flushed/sterile dressing applied/sterile technique, catheter placed
positive blood return obtained via catheter/sutured in place/location identified, draped/prepped, sterile technique used, needle inserted/introduced/all materials/supplies accounted for at end of procedure/connected to a pressurized flush line/hemostasis with direct pressure, dressing applied/Seldinger technique

## 2020-04-02 NOTE — ED ADULT TRIAGE NOTE - CHIEF COMPLAINT QUOTE
Pt CO SOB.  Pt states "My doctor had me checking my Oxygen at home and since this morning I've been in the 80s."  Pt upgraded to MD Director.

## 2020-04-02 NOTE — ED PROVIDER NOTE - OBJECTIVE STATEMENT
79 y/o M with PMHx of HLD and BPH was diagnosed for COVID-19 3wks ago. Pt ahs increased SOB for the past week and was monitoring stat at home based on PMD instructions. Pt noticed ox stat is in his 80s today. Was instructed by his PMD Dr. Mclean to come to the ED for eval. Pt is c/o cough, fever at onset of sxs, and worsening SOB. 79 y/o M with PMHx of HLD and BPH, diagnosed with COVID-19 3wks ago c/o increased SOB for the past week. Pt has been monitoring his sat at home based on PMD instructions. Pt noticed o2 sat was in the 80s today. Was instructed by his PMD Dr. Nunez to come to the ED for eval. Pt c/o cough, fever at onset of sxs, and worsening, progressive SOB.

## 2020-04-02 NOTE — ED ADULT NURSE NOTE - OBJECTIVE STATEMENT
81 y/o male came in to ED for SOB, has been monitoring O2 at home and noticed pulse oxymetry in the 80S. called his PMD and was told to come in to ED. Pt sating in the 80s with non rebreather, upgraded to MD Director.

## 2020-04-02 NOTE — H&P ADULT - NSHPSOCIALHISTORY_GEN_ALL_CORE
SoHx:  Tobacco use: Y/N  Vape use: Y/N  Health care worker: Y/N Lives with wife. Per wife:  Never a smoker.  No marijuana.  No vaping.  No illicit drug use.     Social drinking.

## 2020-04-03 LAB
4/8 RATIO: 4.83 RATIO — HIGH (ref 0.86–4.14)
ABS CD8: 11 /UL — LOW (ref 90–775)
ALBUMIN SERPL ELPH-MCNC: 2.9 G/DL — LOW (ref 3.3–5)
ALP SERPL-CCNC: 58 U/L — SIGNIFICANT CHANGE UP (ref 40–120)
ALT FLD-CCNC: 50 U/L — HIGH (ref 10–45)
ANION GAP SERPL CALC-SCNC: 16 MMOL/L — SIGNIFICANT CHANGE UP (ref 5–17)
ANISOCYTOSIS BLD QL: SLIGHT — SIGNIFICANT CHANGE UP
APTT BLD: 57.7 SEC — HIGH (ref 27.5–36.3)
AST SERPL-CCNC: 51 U/L — HIGH (ref 10–40)
BASE EXCESS BLDA CALC-SCNC: -9.8 MMOL/L — LOW (ref -2–3)
BASOPHILS # BLD AUTO: 0 K/UL — SIGNIFICANT CHANGE UP (ref 0–0.2)
BASOPHILS NFR BLD AUTO: 0 % — SIGNIFICANT CHANGE UP (ref 0–2)
BILIRUB SERPL-MCNC: 0.3 MG/DL — SIGNIFICANT CHANGE UP (ref 0.2–1.2)
BUN SERPL-MCNC: 25 MG/DL — HIGH (ref 7–23)
BURR CELLS BLD QL SMEAR: PRESENT — SIGNIFICANT CHANGE UP
CALCIUM SERPL-MCNC: 9 MG/DL — SIGNIFICANT CHANGE UP (ref 8.4–10.5)
CD3 BLASTS SPEC-ACNC: 40 % — LOW (ref 58–84)
CD3 BLASTS SPEC-ACNC: 63 /UL — LOW (ref 396–2024)
CD4 %: 32 % — SIGNIFICANT CHANGE UP (ref 30–56)
CD8 %: 7 % — LOW (ref 11–43)
CHLORIDE SERPL-SCNC: 97 MMOL/L — SIGNIFICANT CHANGE UP (ref 96–108)
CO2 SERPL-SCNC: 19 MMOL/L — LOW (ref 22–31)
CREAT SERPL-MCNC: 0.96 MG/DL — SIGNIFICANT CHANGE UP (ref 0.5–1.3)
CRP SERPL-MCNC: 30.3 MG/DL — HIGH (ref 0–0.4)
D DIMER BLD IA.RAPID-MCNC: 528 NG/ML DDU — HIGH
EOSINOPHIL # BLD AUTO: 0 K/UL — SIGNIFICANT CHANGE UP (ref 0–0.5)
EOSINOPHIL NFR BLD AUTO: 0 % — SIGNIFICANT CHANGE UP (ref 0–6)
FERRITIN SERPL-MCNC: 1042 NG/ML — HIGH (ref 30–400)
GAS PNL BLDA: SIGNIFICANT CHANGE UP
GIANT PLATELETS BLD QL SMEAR: PRESENT — SIGNIFICANT CHANGE UP
GLUCOSE BLDC GLUCOMTR-MCNC: 116 MG/DL — HIGH (ref 70–99)
GLUCOSE BLDC GLUCOMTR-MCNC: 117 MG/DL — HIGH (ref 70–99)
GLUCOSE BLDC GLUCOMTR-MCNC: 131 MG/DL — HIGH (ref 70–99)
GLUCOSE BLDC GLUCOMTR-MCNC: 169 MG/DL — HIGH (ref 70–99)
GLUCOSE SERPL-MCNC: 186 MG/DL — HIGH (ref 70–99)
HCO3 BLDA-SCNC: 14 MMOL/L — LOW (ref 21–28)
HCT VFR BLD CALC: 46 % — SIGNIFICANT CHANGE UP (ref 39–50)
HGB BLD-MCNC: 15 G/DL — SIGNIFICANT CHANGE UP (ref 13–17)
INR BLD: 1.18 — HIGH (ref 0.88–1.16)
LDH SERPL L TO P-CCNC: 560 U/L — HIGH (ref 50–242)
LYMPHOCYTES # BLD AUTO: 0.1 K/UL — LOW (ref 1–3.3)
LYMPHOCYTES # BLD AUTO: 0.9 % — LOW (ref 13–44)
MAGNESIUM SERPL-MCNC: 2 MG/DL — SIGNIFICANT CHANGE UP (ref 1.6–2.6)
MANUAL SMEAR VERIFICATION: SIGNIFICANT CHANGE UP
MCHC RBC-ENTMCNC: 28.4 PG — SIGNIFICANT CHANGE UP (ref 27–34)
MCHC RBC-ENTMCNC: 32.6 GM/DL — SIGNIFICANT CHANGE UP (ref 32–36)
MCV RBC AUTO: 87.1 FL — SIGNIFICANT CHANGE UP (ref 80–100)
MICROCYTES BLD QL: SLIGHT — SIGNIFICANT CHANGE UP
MONOCYTES # BLD AUTO: 0.1 K/UL — SIGNIFICANT CHANGE UP (ref 0–0.9)
MONOCYTES NFR BLD AUTO: 0.9 % — LOW (ref 2–14)
NEUTROPHILS # BLD AUTO: 10.88 K/UL — HIGH (ref 1.8–7.4)
NEUTROPHILS NFR BLD AUTO: 97.3 % — HIGH (ref 43–77)
NEUTS BAND # BLD: 0.9 % — SIGNIFICANT CHANGE UP (ref 0–8)
OVALOCYTES BLD QL SMEAR: SLIGHT — SIGNIFICANT CHANGE UP
PCO2 BLDA: 25 MMHG — LOW (ref 35–48)
PH BLDA: 7.37 — SIGNIFICANT CHANGE UP (ref 7.35–7.45)
PHOSPHATE SERPL-MCNC: 5 MG/DL — HIGH (ref 2.5–4.5)
PLAT MORPH BLD: ABNORMAL
PLATELET # BLD AUTO: 179 K/UL — SIGNIFICANT CHANGE UP (ref 150–400)
PO2 BLDA: 165 MMHG — HIGH (ref 83–108)
POIKILOCYTOSIS BLD QL AUTO: SLIGHT — SIGNIFICANT CHANGE UP
POTASSIUM SERPL-MCNC: 4.8 MMOL/L — SIGNIFICANT CHANGE UP (ref 3.5–5.3)
POTASSIUM SERPL-SCNC: 4.8 MMOL/L — SIGNIFICANT CHANGE UP (ref 3.5–5.3)
PROCALCITONIN SERPL-MCNC: 1.21 NG/ML — HIGH (ref 0.02–0.1)
PROT SERPL-MCNC: 7.6 G/DL — SIGNIFICANT CHANGE UP (ref 6–8.3)
PROTHROM AB SERPL-ACNC: 13.5 SEC — HIGH (ref 10–12.9)
RBC # BLD: 5.28 M/UL — SIGNIFICANT CHANGE UP (ref 4.2–5.8)
RBC # FLD: 14.5 % — SIGNIFICANT CHANGE UP (ref 10.3–14.5)
RBC BLD AUTO: ABNORMAL
SAO2 % BLDA: 99 % — SIGNIFICANT CHANGE UP (ref 95–100)
SODIUM SERPL-SCNC: 132 MMOL/L — LOW (ref 135–145)
SPHEROCYTES BLD QL SMEAR: SLIGHT — SIGNIFICANT CHANGE UP
T-CELL CD4 SUBSET PNL BLD: 51 /UL — LOW (ref 325–1251)
WBC # BLD: 11.08 K/UL — HIGH (ref 3.8–10.5)
WBC # FLD AUTO: 11.08 K/UL — HIGH (ref 3.8–10.5)

## 2020-04-03 RX ORDER — MIDODRINE HYDROCHLORIDE 2.5 MG/1
10 TABLET ORAL EVERY 8 HOURS
Refills: 0 | Status: DISCONTINUED | OUTPATIENT
Start: 2020-04-03 | End: 2020-04-07

## 2020-04-03 RX ORDER — ACETAMINOPHEN 500 MG
650 TABLET ORAL EVERY 6 HOURS
Refills: 0 | Status: DISCONTINUED | OUTPATIENT
Start: 2020-04-03 | End: 2020-04-24

## 2020-04-03 RX ADMIN — ENOXAPARIN SODIUM 80 MILLIGRAM(S): 100 INJECTION SUBCUTANEOUS at 05:18

## 2020-04-03 RX ADMIN — Medication 2: at 06:12

## 2020-04-03 RX ADMIN — DEXMEDETOMIDINE HYDROCHLORIDE IN 0.9% SODIUM CHLORIDE 4.43 MICROGRAM(S)/KG/HR: 4 INJECTION INTRAVENOUS at 02:43

## 2020-04-03 RX ADMIN — DEXMEDETOMIDINE HYDROCHLORIDE IN 0.9% SODIUM CHLORIDE 4.43 MICROGRAM(S)/KG/HR: 4 INJECTION INTRAVENOUS at 05:17

## 2020-04-03 RX ADMIN — CHLORHEXIDINE GLUCONATE 15 MILLILITER(S): 213 SOLUTION TOPICAL at 05:18

## 2020-04-03 RX ADMIN — DEXMEDETOMIDINE HYDROCHLORIDE IN 0.9% SODIUM CHLORIDE 4.43 MICROGRAM(S)/KG/HR: 4 INJECTION INTRAVENOUS at 10:50

## 2020-04-03 RX ADMIN — ENOXAPARIN SODIUM 80 MILLIGRAM(S): 100 INJECTION SUBCUTANEOUS at 17:36

## 2020-04-03 RX ADMIN — Medication 650 MILLIGRAM(S): at 22:55

## 2020-04-03 RX ADMIN — DEXMEDETOMIDINE HYDROCHLORIDE IN 0.9% SODIUM CHLORIDE 4.43 MICROGRAM(S)/KG/HR: 4 INJECTION INTRAVENOUS at 08:25

## 2020-04-03 RX ADMIN — PANTOPRAZOLE SODIUM 40 MILLIGRAM(S): 20 TABLET, DELAYED RELEASE ORAL at 11:49

## 2020-04-03 RX ADMIN — Medication 40 MILLIGRAM(S): at 15:29

## 2020-04-03 RX ADMIN — MIDODRINE HYDROCHLORIDE 10 MILLIGRAM(S): 2.5 TABLET ORAL at 22:55

## 2020-04-03 RX ADMIN — Medication 40 MILLIGRAM(S): at 02:43

## 2020-04-03 RX ADMIN — MIDODRINE HYDROCHLORIDE 10 MILLIGRAM(S): 2.5 TABLET ORAL at 16:51

## 2020-04-03 RX ADMIN — CHLORHEXIDINE GLUCONATE 1 APPLICATION(S): 213 SOLUTION TOPICAL at 05:17

## 2020-04-03 RX ADMIN — Medication 8.3 MICROGRAM(S)/KG/MIN: at 08:25

## 2020-04-03 NOTE — DIETITIAN INITIAL EVALUATION ADULT. - ENERGY NEEDS
Ht (4/2): 188cm, Wt (4/2): 88.5kg, IBW: 86.4kg+/-10%, %IBW: 102%, BMI: 25.1   IBW used to calculate energy needs due to pt's current body weight exceeding 100% of IBW in the ICU setting. Needs adjusted for vent, infection. Fluid needs per team. Ht (4/2): 188cm, Wt (4/2): 88.5kg, IBW: 86.4kg+/-10%, %IBW: 102%, BMI: 25.1   IBW used to calculate energy needs due to pt's current body weight exceeding 100% of IBW in the ICU setting. Needs adjusted for infection, hypermtabolic state. Fluid needs per team.

## 2020-04-03 NOTE — PROVIDER CONTACT NOTE (CHANGE IN STATUS NOTIFICATION) - ASSESSMENT
in last 15 minutes, noted patient's HR to increase to 110s-130s unsustained. remains tachypneic with no other distress. denies discomfort.

## 2020-04-03 NOTE — DIETITIAN INITIAL EVALUATION ADULT. - OTHER INFO
81 yo M h/o BPH, HLD first had fever on 3/31 started plaquenil and azithro then had 02 sat in low 80s and came to ED then intubated. Found to have new Afib with RVR. Vent mode: AC/CMV. Infusions running: Precedex, Levophed, Propofol at 10mL/hr. . No vomiting noted, unable to assess for nausea 2/2 clinical status, no BM during admission, no apparent pain/distress at this time (pt sedated). Gary score 12. Unable to obtain information regarding diet/wt hx PTA as pt intubated and no family at bedside. Unable to conduct a face to face interview or nutrition-focused physical exam due to limited contact restrictions related to their medical condition and isolation precautions. Please see below for full nutritional recommendations- d/w team. RD to monitor and f/u per protocol. 79 yo M h/o BPH, HLD first had fever on 3/31 started plaquenil and azithro then had 02 sat in low 80s and came to ED then intubated. Found to have new Afib with RVR. Pt extubated this AM, now on re-breather mask. Infusions running: Levophed. . No vomiting noted, unable to assess for nausea 2/2 clinical status, no BM during admission, no apparent pain/distress at this time (pt sedated). Gary score 12. Unable to obtain information regarding diet/wt hx PTA as pt intubated and no family at bedside. Unable to conduct a face to face interview or nutrition-focused physical exam due to limited contact restrictions related to their medical condition and isolation precautions. Please see below for full nutritional recommendations- d/w team. RD to monitor and f/u per protocol.

## 2020-04-03 NOTE — PROGRESS NOTE ADULT - ASSESSMENT
79 yo M h/o BPH, HLD first had fever on 3/31 started plaquenil and azithro then had 02 sat in low 80s and came to ED then intubated. Found to have new Afib with RVR.     NEUROLOGY  #Intubated  Patient intubated on arrival due to hypoxic respiratory failure. AAOx3 at baseline   - Patient tolerated CPAP trial well -> successfully extubated 4/3    RESPIRATORY  #Acute Hypoxic Respiratory failure 2/2 COVID-19   Patient intubated on arrival. Completed azithromycin and hydroxychloroquine outpatient.   - s/p tociluzumab 4/2  - c/w methylprednisolone 40mg IV x5 days (4/2-4/6)   - successfully extubated to NRB (4/3)  - step down to telemetry    CARDIOVASCULAR  #Hemodynamic  Patient euvolemic on exam.  - judicious fluid utilization in setting of COVID and concern for impending ARDS  - currently requiring levophed in setting of sedation -> decreasing levophed requirements, initialed midodrine 10mg TID  - monitor urine output  - MAP goal >65     #Hypertension  Patient with history of hypertension, well controlled at home.  - on home metoprolol 12.5 BID, held inpatient   - continue to monitor    RENAL  #ALL  Patient with baseline Cr 0.9, Cr 1.21 on admission.  - likely prerenal in setting of diarrhea   - continue to monitor    INFECTIOUS DISEASE  #COVID-19  INFECTIOUS DISEASE:  #COVID-19  Pt positive for COVID-19. CXR with bilateral infiltrates  Now with downtrending leukocytosis and downtrending inflammatory biomarkers (D-dimer, CRP). Stable procalcitonin.   - s/p COVID bundle: azithromycin, hydroxychloroquine outpatient  - s/p tociluzumab (4/2)   - c/w methylprednisolone 40mg IV BID (4/2-4/6)   - Blood cx  NGTD   - Tylenol PRN for fever  - Robitussin PRN for cough  - Avoid NSAIDs, ACE/ARB  - no nebulizers, MDI only  - COVID Isolation precautions: contact and droplet   - continue to monitor daily CBC w/ differential, CMP, Mg, Phos, CRP, procalcitonin, and ABG  - q3d labs: ESR, Tcell subset, d-dimer, LDH, ferritin, CK, trop, coags      GI  #Prophylaxis   - pantoprazole 40mg IV        #BPH  - conte in place (4/2)  - monitor urinary output       F: none  E: replete to K>4, Mg>2  N: passed bedside dysphagia   Lines: PIV,  Conte (4/2)     PPx:    DVT: hep subq     GI: protonix IVP    Code status: FULL CODE     Dispo: MICU-COVID 79 yo M h/o BPH, HLD first had fever on 3/31 started plaquenil and azithro then had 02 sat in low 80s and came to ED then intubated. Found to have new Afib with RVR.     NEUROLOGY  #Intubated  Patient intubated on arrival due to hypoxic respiratory failure. AAOx3 at baseline   - Patient tolerated CPAP trial well -> successfully extubated 4/3    RESPIRATORY  #Acute Hypoxic Respiratory failure 2/2 COVID-19   Patient intubated on arrival. Completed azithromycin and hydroxychloroquine outpatient.   - s/p tociluzumab 4/2  - c/w methylprednisolone 40mg IV x5 days (4/2-4/6)   - successfully extubated to NRB (4/3)  - step down to telemetry    CARDIOVASCULAR  #Hemodynamic  Patient euvolemic on exam.  - judicious fluid utilization in setting of COVID and concern for impending ARDS  - currently requiring levophed in setting of sedation -> decreasing levophed requirements, initialed midodrine 10mg TID  - monitor urine output  - MAP goal >65     #Hypertension  Patient with history of hypertension, well controlled at home.  - on home metoprolol 12.5 BID, held inpatient   - continue to monitor    RENAL  #ALL RESOLVED  Patient with baseline Cr 0.9, Cr 1.21 on admission.  - likely prerenal in setting of diarrhea   - continue to monitor      INFECTIOUS DISEASE:  #COVID-19  Pt positive for COVID-19. CXR with bilateral infiltrates  Now with downtrending leukocytosis and downtrending inflammatory biomarkers (D-dimer, CRP). Stable procalcitonin.   - s/p COVID bundle: azithromycin, hydroxychloroquine outpatient  - s/p tociluzumab (4/2)   - c/w methylprednisolone 40mg IV BID (4/2-4/6)   - Blood cx  NGTD   - Tylenol PRN for fever  - Robitussin PRN for cough  - Avoid NSAIDs, ACE/ARB  - no nebulizers, MDI only  - COVID Isolation precautions: contact and droplet   - continue to monitor daily CBC w/ differential, CMP, Mg, Phos, CRP, procalcitonin, and ABG  - q3d labs: ESR, Tcell subset, d-dimer, LDH, ferritin, CK, trop, coags      GI  #Prophylaxis   - pantoprazole 40mg IV        #BPH  - conte in place (4/2)  - monitor urinary output       F: none  E: replete to K>4, Mg>2  N: passed bedside dysphagia   Lines: PIV,  Conte (4/2)     PPx:    DVT: hep subq     GI: protonix IVP    Code status: FULL CODE     Dispo: MICU-COVID

## 2020-04-03 NOTE — PROVIDER CONTACT NOTE (CHANGE IN STATUS NOTIFICATION) - ASSESSMENT
In the last two hours, patient intermittently with O2 sat 88-94% on 15LPM non rebreather. Patient remains tachypneic but otherwise denies dyspnea, no noticed increased WOB.

## 2020-04-03 NOTE — DIETITIAN INITIAL EVALUATION ADULT. - ADD RECOMMEND
1. As medically appropriate, recommend 1. Recommend RN bedside dysphagia screen vs SLP eval prior to diet initiation 2. Please re-consult nutrition if pt unable to pass swallow eval and will need TF

## 2020-04-03 NOTE — DIETITIAN INITIAL EVALUATION ADULT. - PERTINENT MEDS FT
Lovenox, Humalog, tylenol, precdex, solu-medrol, levophed, Protonix, Propofol @10mL (264 kcal from lipid) Lovenox, Humalog, tylenol, solu-medrol, levophed, Protonix

## 2020-04-03 NOTE — DIETITIAN INITIAL EVALUATION ADULT. - PERTINENT LABORATORY DATA
4/2: POCT 203, 136, 158  4/3: Na 132, BUN 25, Glu 186, AST 51, ALT 50, CRP 30.3, ferritin 1042, phos 5, POCT 169

## 2020-04-03 NOTE — PROGRESS NOTE ADULT - SUBJECTIVE AND OBJECTIVE BOX
OVERNIGHT EVENTS:    SUBJECTIVE / INTERVAL HPI: Patient seen and examined at bedside.     VITAL SIGNS:  Vital Signs Last 24 Hrs  T(C): 36.8 (03 Apr 2020 21:25), Max: 37.6 (03 Apr 2020 19:45)  T(F): 98.3 (03 Apr 2020 21:25), Max: 99.6 (03 Apr 2020 19:45)  HR: 121 (03 Apr 2020 23:00) (63 - 128)  BP: 142/91 (03 Apr 2020 07:00) (112/59 - 159/79)  BP(mean): 111 (03 Apr 2020 07:00) (80 - 117)  RR: 37 (03 Apr 2020 23:00) (18 - 41)  SpO2: 92% (03 Apr 2020 23:00) (86% - 100%)    PHYSICAL EXAM:    General: WDWN  HEENT: NC/AT; PERRL, anicteric sclera; MMM  Neck: supple  Cardiovascular: +S1/S2; RRR  Respiratory: CTA B/L; no W/R/R  Gastrointestinal: soft, NT/ND; +BSx4  Extremities: WWP; no edema, clubbing or cyanosis  Vascular: 2+ radial, DP/PT pulses B/L  Neurological: AAOx3; no focal deficits    MEDICATIONS:  MEDICATIONS  (STANDING):  chlorhexidine 0.12% Liquid 15 milliLiter(s) Oral Mucosa every 12 hours  chlorhexidine 2% Cloths 1 Application(s) Topical <User Schedule>  dexMEDEtomidine Infusion 0.2 MICROgram(s)/kG/Hr (4.43 mL/Hr) IV Continuous <Continuous>  enoxaparin Injectable 80 milliGRAM(s) SubCutaneous every 12 hours  insulin lispro (HumaLOG) corrective regimen sliding scale   SubCutaneous every 6 hours  methylPREDNISolone sodium succinate Injectable 40 milliGRAM(s) IV Push every 12 hours  midodrine 10 milliGRAM(s) Oral every 8 hours  norepinephrine Infusion 0.05 MICROgram(s)/kG/Min (8.3 mL/Hr) IV Continuous <Continuous>  pantoprazole  Injectable 40 milliGRAM(s) IV Push daily  propofol Infusion 9.416 MICROgram(s)/kG/Min (5 mL/Hr) IV Continuous <Continuous>    MEDICATIONS  (PRN):  acetaminophen    Suspension .. 650 milliGRAM(s) Enteral Tube every 6 hours PRN Temp greater or equal to 38C (100.4F), Moderate Pain (4 - 6)      ALLERGIES:  Allergies    No Known Allergies    Intolerances        LABS:                        15.0   11.08 )-----------( 179      ( 03 Apr 2020 06:52 )             46.0     04-03    132<L>  |  97  |  25<H>  ----------------------------<  186<H>  4.8   |  19<L>  |  0.96    Ca    9.0      03 Apr 2020 06:52  Phos  5.0     04-03  Mg     2.0     04-03    TPro  7.6  /  Alb  2.9<L>  /  TBili  0.3  /  DBili  x   /  AST  51<H>  /  ALT  50<H>  /  AlkPhos  58  04-03    PT/INR - ( 03 Apr 2020 06:52 )   PT: 13.5 sec;   INR: 1.18          PTT - ( 03 Apr 2020 06:52 )  PTT:57.7 sec  Urinalysis Basic - ( 02 Apr 2020 11:12 )    Color: Yellow / Appearance: Clear / SG: >=1.030 / pH: x  Gluc: x / Ketone: Trace mg/dL  / Bili: Negative / Urobili: 0.2 E.U./dL   Blood: x / Protein: >=300 mg/dL / Nitrite: NEGATIVE   Leuk Esterase: NEGATIVE / RBC: < 5 /HPF / WBC < 5 /HPF   Sq Epi: x / Non Sq Epi: 0-5 /HPF / Bacteria: Present /HPF      CAPILLARY BLOOD GLUCOSE      POCT Blood Glucose.: 116 mg/dL (03 Apr 2020 22:27)      RADIOLOGY & ADDITIONAL TESTS: Reviewed.    ASSESSMENT:    PLAN: HOSPITAL COURSE: 79 y/o M with HTN, HLD, and BPH presenting with acute hyoxic respiratory failure (desaturating to 91% on NRB and tachypneic) intubated on arrival found to be COVID-19 positive. Patient s/p azithromycin and hydroxychloroquine outpatient. COVID managed with tocilizumab (4/2) and methylprednisolone 40mg IV BID (4/2-4/6). Patient successfully extubated 4/3, with decreasing levophed requirements, midodrine 10mg TID initiated. Patient medically optimized and stepped down to cardiac telemetry     OVERNIGHT EVENTS: No acute events. Afebrile. VSS. HD stable.     SUBJECTIVE / INTERVAL HPI: Patient seen and examined  by attending and fellow.     VITAL SIGNS:  Vital Signs Last 24 Hrs  T(C): 36.8 (03 Apr 2020 21:25), Max: 37.6 (03 Apr 2020 19:45)  T(F): 98.3 (03 Apr 2020 21:25), Max: 99.6 (03 Apr 2020 19:45)  HR: 121 (03 Apr 2020 23:00) (63 - 128)  BP: 142/91 (03 Apr 2020 07:00) (112/59 - 159/79)  BP(mean): 111 (03 Apr 2020 07:00) (80 - 117)  RR: 37 (03 Apr 2020 23:00) (18 - 41)  SpO2: 92% (03 Apr 2020 23:00) (86% - 100%)    PHYSICAL EXAM:  seen and examined by fellow and attending.     MEDICATIONS:  MEDICATIONS  (STANDING):  chlorhexidine 0.12% Liquid 15 milliLiter(s) Oral Mucosa every 12 hours  chlorhexidine 2% Cloths 1 Application(s) Topical <User Schedule>  dexMEDEtomidine Infusion 0.2 MICROgram(s)/kG/Hr (4.43 mL/Hr) IV Continuous <Continuous>  enoxaparin Injectable 80 milliGRAM(s) SubCutaneous every 12 hours  insulin lispro (HumaLOG) corrective regimen sliding scale   SubCutaneous every 6 hours  methylPREDNISolone sodium succinate Injectable 40 milliGRAM(s) IV Push every 12 hours  midodrine 10 milliGRAM(s) Oral every 8 hours  norepinephrine Infusion 0.05 MICROgram(s)/kG/Min (8.3 mL/Hr) IV Continuous <Continuous>  pantoprazole  Injectable 40 milliGRAM(s) IV Push daily  propofol Infusion 9.416 MICROgram(s)/kG/Min (5 mL/Hr) IV Continuous <Continuous>    MEDICATIONS  (PRN):  acetaminophen    Suspension .. 650 milliGRAM(s) Enteral Tube every 6 hours PRN Temp greater or equal to 38C (100.4F), Moderate Pain (4 - 6)      ALLERGIES:  Allergies    No Known Allergies    Intolerances        LABS:                        15.0   11.08 )-----------( 179      ( 03 Apr 2020 06:52 )             46.0     04-03    132<L>  |  97  |  25<H>  ----------------------------<  186<H>  4.8   |  19<L>  |  0.96    Ca    9.0      03 Apr 2020 06:52  Phos  5.0     04-03  Mg     2.0     04-03    TPro  7.6  /  Alb  2.9<L>  /  TBili  0.3  /  DBili  x   /  AST  51<H>  /  ALT  50<H>  /  AlkPhos  58  04-03    PT/INR - ( 03 Apr 2020 06:52 )   PT: 13.5 sec;   INR: 1.18          PTT - ( 03 Apr 2020 06:52 )  PTT:57.7 sec  Urinalysis Basic - ( 02 Apr 2020 11:12 )    Color: Yellow / Appearance: Clear / SG: >=1.030 / pH: x  Gluc: x / Ketone: Trace mg/dL  / Bili: Negative / Urobili: 0.2 E.U./dL   Blood: x / Protein: >=300 mg/dL / Nitrite: NEGATIVE   Leuk Esterase: NEGATIVE / RBC: < 5 /HPF / WBC < 5 /HPF   Sq Epi: x / Non Sq Epi: 0-5 /HPF / Bacteria: Present /HPF      CAPILLARY BLOOD GLUCOSE      POCT Blood Glucose.: 116 mg/dL (03 Apr 2020 22:27)      RADIOLOGY & ADDITIONAL TESTS: Reviewed.    ASSESSMENT:    PLAN:

## 2020-04-04 DIAGNOSIS — J96.01 ACUTE RESPIRATORY FAILURE WITH HYPOXIA: ICD-10-CM

## 2020-04-04 DIAGNOSIS — I48.0 PAROXYSMAL ATRIAL FIBRILLATION: ICD-10-CM

## 2020-04-04 DIAGNOSIS — I10 ESSENTIAL (PRIMARY) HYPERTENSION: ICD-10-CM

## 2020-04-04 DIAGNOSIS — B34.2 CORONAVIRUS INFECTION, UNSPECIFIED: ICD-10-CM

## 2020-04-04 DIAGNOSIS — R63.8 OTHER SYMPTOMS AND SIGNS CONCERNING FOOD AND FLUID INTAKE: ICD-10-CM

## 2020-04-04 DIAGNOSIS — N40.0 BENIGN PROSTATIC HYPERPLASIA WITHOUT LOWER URINARY TRACT SYMPTOMS: ICD-10-CM

## 2020-04-04 LAB
ACANTHOCYTES BLD QL SMEAR: SLIGHT — SIGNIFICANT CHANGE UP
ALBUMIN SERPL ELPH-MCNC: 2.9 G/DL — LOW (ref 3.3–5)
ALP SERPL-CCNC: 75 U/L — SIGNIFICANT CHANGE UP (ref 40–120)
ALT FLD-CCNC: 108 U/L — HIGH (ref 10–45)
ANION GAP SERPL CALC-SCNC: 12 MMOL/L — SIGNIFICANT CHANGE UP (ref 5–17)
ANISOCYTOSIS BLD QL: SLIGHT — SIGNIFICANT CHANGE UP
AST SERPL-CCNC: 121 U/L — HIGH (ref 10–40)
BASOPHILS # BLD AUTO: 0.02 K/UL — SIGNIFICANT CHANGE UP (ref 0–0.2)
BASOPHILS NFR BLD AUTO: 0.1 % — SIGNIFICANT CHANGE UP (ref 0–2)
BILIRUB SERPL-MCNC: 0.4 MG/DL — SIGNIFICANT CHANGE UP (ref 0.2–1.2)
BUN SERPL-MCNC: 38 MG/DL — HIGH (ref 7–23)
BURR CELLS BLD QL SMEAR: PRESENT — SIGNIFICANT CHANGE UP
CALCIUM SERPL-MCNC: 9.2 MG/DL — SIGNIFICANT CHANGE UP (ref 8.4–10.5)
CHLORIDE SERPL-SCNC: 104 MMOL/L — SIGNIFICANT CHANGE UP (ref 96–108)
CO2 SERPL-SCNC: 18 MMOL/L — LOW (ref 22–31)
CREAT SERPL-MCNC: 1.02 MG/DL — SIGNIFICANT CHANGE UP (ref 0.5–1.3)
CRP SERPL-MCNC: 13.87 MG/DL — HIGH (ref 0–0.4)
CULTURE RESULTS: NO GROWTH — SIGNIFICANT CHANGE UP
D DIMER BLD IA.RAPID-MCNC: 453 NG/ML DDU — HIGH
EOSINOPHIL # BLD AUTO: 0 K/UL — SIGNIFICANT CHANGE UP (ref 0–0.5)
EOSINOPHIL NFR BLD AUTO: 0 % — SIGNIFICANT CHANGE UP (ref 0–6)
FERRITIN SERPL-MCNC: 1283 NG/ML — HIGH (ref 30–400)
G6PD RBC-CCNC: 14.4 U/G HGB — SIGNIFICANT CHANGE UP (ref 7–20.5)
GLUCOSE BLDC GLUCOMTR-MCNC: 127 MG/DL — HIGH (ref 70–99)
GLUCOSE BLDC GLUCOMTR-MCNC: 132 MG/DL — HIGH (ref 70–99)
GLUCOSE BLDC GLUCOMTR-MCNC: 137 MG/DL — HIGH (ref 70–99)
GLUCOSE BLDC GLUCOMTR-MCNC: 149 MG/DL — HIGH (ref 70–99)
GLUCOSE SERPL-MCNC: 154 MG/DL — HIGH (ref 70–99)
HCT VFR BLD CALC: 42.8 % — SIGNIFICANT CHANGE UP (ref 39–50)
HGB BLD-MCNC: 14.7 G/DL — SIGNIFICANT CHANGE UP (ref 13–17)
IMM GRANULOCYTES NFR BLD AUTO: 1.1 % — SIGNIFICANT CHANGE UP (ref 0–1.5)
LYMPHOCYTES # BLD AUTO: 0.32 K/UL — LOW (ref 1–3.3)
LYMPHOCYTES # BLD AUTO: 2.1 % — LOW (ref 13–44)
MAGNESIUM SERPL-MCNC: 1.9 MG/DL — SIGNIFICANT CHANGE UP (ref 1.6–2.6)
MANUAL SMEAR VERIFICATION: SIGNIFICANT CHANGE UP
MCHC RBC-ENTMCNC: 28.9 PG — SIGNIFICANT CHANGE UP (ref 27–34)
MCHC RBC-ENTMCNC: 34.3 GM/DL — SIGNIFICANT CHANGE UP (ref 32–36)
MCV RBC AUTO: 84.1 FL — SIGNIFICANT CHANGE UP (ref 80–100)
MICROCYTES BLD QL: SLIGHT — SIGNIFICANT CHANGE UP
MONOCYTES # BLD AUTO: 0.49 K/UL — SIGNIFICANT CHANGE UP (ref 0–0.9)
MONOCYTES NFR BLD AUTO: 3.2 % — SIGNIFICANT CHANGE UP (ref 2–14)
NEUTROPHILS # BLD AUTO: 14.44 K/UL — HIGH (ref 1.8–7.4)
NEUTROPHILS NFR BLD AUTO: 93.5 % — HIGH (ref 43–77)
NRBC # BLD: 0 /100 WBCS — SIGNIFICANT CHANGE UP (ref 0–0)
OVALOCYTES BLD QL SMEAR: SLIGHT — SIGNIFICANT CHANGE UP
PHOSPHATE SERPL-MCNC: 3.6 MG/DL — SIGNIFICANT CHANGE UP (ref 2.5–4.5)
PLAT MORPH BLD: NORMAL — SIGNIFICANT CHANGE UP
PLATELET # BLD AUTO: 223 K/UL — SIGNIFICANT CHANGE UP (ref 150–400)
POIKILOCYTOSIS BLD QL AUTO: SLIGHT — SIGNIFICANT CHANGE UP
POTASSIUM SERPL-MCNC: 4.3 MMOL/L — SIGNIFICANT CHANGE UP (ref 3.5–5.3)
POTASSIUM SERPL-SCNC: 4.3 MMOL/L — SIGNIFICANT CHANGE UP (ref 3.5–5.3)
PROCALCITONIN SERPL-MCNC: 0.62 NG/ML — HIGH (ref 0.02–0.1)
PROT SERPL-MCNC: 6.8 G/DL — SIGNIFICANT CHANGE UP (ref 6–8.3)
RBC # BLD: 5.09 M/UL — SIGNIFICANT CHANGE UP (ref 4.2–5.8)
RBC # FLD: 14.3 % — SIGNIFICANT CHANGE UP (ref 10.3–14.5)
RBC BLD AUTO: ABNORMAL
SCHISTOCYTES BLD QL AUTO: SLIGHT — SIGNIFICANT CHANGE UP
SODIUM SERPL-SCNC: 134 MMOL/L — LOW (ref 135–145)
SPECIMEN SOURCE: SIGNIFICANT CHANGE UP
WBC # BLD: 15.44 K/UL — HIGH (ref 3.8–10.5)
WBC # FLD AUTO: 15.44 K/UL — HIGH (ref 3.8–10.5)

## 2020-04-04 RX ORDER — METOPROLOL TARTRATE 50 MG
25 TABLET ORAL EVERY 12 HOURS
Refills: 0 | Status: DISCONTINUED | OUTPATIENT
Start: 2020-04-04 | End: 2020-04-09

## 2020-04-04 RX ADMIN — Medication 40 MILLIGRAM(S): at 16:52

## 2020-04-04 RX ADMIN — Medication 650 MILLIGRAM(S): at 14:39

## 2020-04-04 RX ADMIN — Medication 40 MILLIGRAM(S): at 06:07

## 2020-04-04 RX ADMIN — ENOXAPARIN SODIUM 80 MILLIGRAM(S): 100 INJECTION SUBCUTANEOUS at 06:06

## 2020-04-04 RX ADMIN — MIDODRINE HYDROCHLORIDE 10 MILLIGRAM(S): 2.5 TABLET ORAL at 06:06

## 2020-04-04 RX ADMIN — CHLORHEXIDINE GLUCONATE 1 APPLICATION(S): 213 SOLUTION TOPICAL at 08:19

## 2020-04-04 RX ADMIN — Medication 25 MILLIGRAM(S): at 16:52

## 2020-04-04 RX ADMIN — MIDODRINE HYDROCHLORIDE 10 MILLIGRAM(S): 2.5 TABLET ORAL at 22:34

## 2020-04-04 RX ADMIN — CHLORHEXIDINE GLUCONATE 15 MILLILITER(S): 213 SOLUTION TOPICAL at 17:20

## 2020-04-04 RX ADMIN — CHLORHEXIDINE GLUCONATE 15 MILLILITER(S): 213 SOLUTION TOPICAL at 08:19

## 2020-04-04 RX ADMIN — ENOXAPARIN SODIUM 80 MILLIGRAM(S): 100 INJECTION SUBCUTANEOUS at 17:08

## 2020-04-04 NOTE — PROGRESS NOTE ADULT - SUBJECTIVE AND OBJECTIVE BOX
[facility] COVID MICU TO [facility] TELE TRANSFER NOTE    HOSPITAL COURSE:  79 y/o M with HTN, HLD, and BPH presenting with acute hyoxic respiratory failure (desaturating to 91% on NRB and tachypneic) intubated on arrival found to be COVID-19 positive. Patient s/p azithromycin and hydroxychloroquine outpatient. COVID managed with tocilizumab (4/2) and methylprednisolone 40mg IV BID (4/2-4/6). Patient successfully extubated 4/3, with decreasing levophed requirements, midodrine 10mg TID initiated. Patient now off of levophed. Patient medically optimized and stepped down to [facility] telemetry for close respiratory monitoring.     OVERNIGHT EVENTS: Continues to be on NRB    SUBJECTIVE/INTERVAL HPI: Patient was seen and examined by attending and fellow at bedside this morning with full PPE. Rest of exam per MICU team     VITALS  Vital Signs Last 24 Hrs  T(C): 37.1 (04 Apr 2020 09:00), Max: 37.6 (03 Apr 2020 19:45)  T(F): 98.7 (04 Apr 2020 09:00), Max: 99.6 (03 Apr 2020 19:45)  HR: 126 (04 Apr 2020 09:00) (76 - 128)  BP: 105/65 (04 Apr 2020 09:00) (105/65 - 105/65)  BP(mean): 78 (04 Apr 2020 09:00) (78 - 78)  RR: 30 (04 Apr 2020 09:00) (24 - 41)  SpO2: 89% (04 Apr 2020 09:00) (86% - 96%)    I&O's Summary    03 Apr 2020 07:01  -  04 Apr 2020 07:00  --------------------------------------------------------  IN: 600.9 mL / OUT: 1205 mL / NET: -604.1 mL    04 Apr 2020 07:01  -  04 Apr 2020 12:25  --------------------------------------------------------  IN: 0 mL / OUT: 100 mL / NET: -100 mL        CAPILLARY BLOOD GLUCOSE      POCT Blood Glucose.: 137 mg/dL (04 Apr 2020 06:14)  POCT Blood Glucose.: 116 mg/dL (03 Apr 2020 22:27)  POCT Blood Glucose.: 117 mg/dL (03 Apr 2020 16:52)      PHYSICAL EXAM  Patient was seen and examined by attending and fellow.    MEDICATIONS  (STANDING):  chlorhexidine 0.12% Liquid 15 milliLiter(s) Oral Mucosa every 12 hours  chlorhexidine 2% Cloths 1 Application(s) Topical <User Schedule>  enoxaparin Injectable 80 milliGRAM(s) SubCutaneous every 12 hours  insulin lispro (HumaLOG) corrective regimen sliding scale   SubCutaneous every 6 hours  methylPREDNISolone sodium succinate Injectable 40 milliGRAM(s) IV Push every 12 hours  midodrine 10 milliGRAM(s) Oral every 8 hours    MEDICATIONS  (PRN):  acetaminophen    Suspension .. 650 milliGRAM(s) Enteral Tube every 6 hours PRN Temp greater or equal to 38C (100.4F), Moderate Pain (4 - 6)      LABS                        14.7   15.44 )-----------( 223      ( 04 Apr 2020 06:47 )             42.8     04-04    134<L>  |  104  |  38<H>  ----------------------------<  154<H>  4.3   |  18<L>  |  1.02    Ca    9.2      04 Apr 2020 06:45  Phos  3.6     04-04  Mg     1.9     04-04    TPro  6.8  /  Alb  2.9<L>  /  TBili  0.4  /  DBili  x   /  AST  121<H>  /  ALT  108<H>  /  AlkPhos  75  04-04    LIVER FUNCTIONS - ( 04 Apr 2020 06:45 )  Alb: 2.9 g/dL / Pro: 6.8 g/dL / ALK PHOS: 75 U/L / ALT: 108 U/L / AST: 121 U/L / GGT: x           PT/INR - ( 03 Apr 2020 06:52 )   PT: 13.5 sec;   INR: 1.18          PTT - ( 03 Apr 2020 06:52 )  PTT:57.7 sec          Radiology and other tests: Reviewed

## 2020-04-04 NOTE — PROGRESS NOTE ADULT - ASSESSMENT
81 yo M h/o BPH, HLD first had fever on 3/31 started plaquenil and azithro then had 02 sat in low 80s and came to ED then intubated. Found to have new Afib with RVR. Now s/p extubation on 4/3 and stable for stepdown to COVID-telemetry

## 2020-04-04 NOTE — PROGRESS NOTE ADULT - ASSESSMENT
79 yo M h/o BPH, HLD first had fever on 3/31 started plaquenil and azithro then had 02 sat in low 80s and came to ED then intubated. Found to have new Afib with RVR. Now s/p extubation on 4/3 and stable for stepdown to COVID-telemetry    NEUROLOGY  #Extubated 4/3  Patient intubated on arrival due to hypoxic respiratory failure. AAOx3 at baseline   - Patient tolerated CPAP trial well -> successfully extubated 4/3 to NRB      RESPIRATORY  #Acute Hypoxic Respiratory failure 2/2 COVID-19   Patient intubated on arrival. Completed azithromycin and hydroxychloroquine outpatient.   - s/p tociluzumab 4/2  - c/w methylprednisolone 40mg IV x5 days (4/2-4/6)   - successfully extubated to NRB (4/3)  - step down to telemetry    CARDIOVASCULAR  #Hemodynamic  Patient euvolemic on exam.  - judicious fluid utilization in setting of COVID and concern for impending ARDS  - patient off of sedation and levophed  -c/w midodrine 10mg TID  - monitor urine output  - MAP goal >65     #Hypertension  Patient with history of hypertension, well controlled at home.  - restarted lopressor as 25mg BID   - continue to monitor    RENAL  #ALL RESOLVED  Patient with baseline Cr 0.9, Cr 1.21 on admission.  - likely prerenal in setting of diarrhea   - continue to monitor      INFECTIOUS DISEASE:  #COVID-19  Pt positive for COVID-19. CXR with bilateral infiltrates  Now with downtrending leukocytosis and downtrending inflammatory biomarkers (D-dimer, CRP). Stable procalcitonin.   - s/p COVID bundle: azithromycin, hydroxychloroquine outpatient  - s/p tociluzumab (4/2)   - c/w methylprednisolone 40mg IV BID (4/2-4/6)   - Blood cx  NGTD   - Tylenol PRN for fever  - Robitussin PRN for cough  - Avoid NSAIDs, ACE/ARB  - no nebulizers, MDI only  - COVID Isolation precautions: contact and droplet   - continue to monitor daily CBC w/ differential, CMP, Mg, Phos, CRP, procalcitonin, and ABG  - q3d labs: ESR, Tcell subset, d-dimer, LDH, ferritin, CK, trop, coags      GI  #Prophylaxis   - pantoprazole 40mg IV        #BPH  - conte in place (4/2)  - monitor urinary output       F: none  E: replete to K>4, Mg>2  N: passed bedside dysphagia   Lines: PIV,  Conte (4/2)     PPx:    DVT: hep subq     GI: protonix IVP    Code status: FULL CODE     Dispo: COVID-TELE

## 2020-04-04 NOTE — PROGRESS NOTE ADULT - PROBLEM SELECTOR PLAN 4
Patient with episode of paroxysmal atrial fibrillation during admission, no record of prior atrial fibrillation  - currently on lovenox for anti coagulation and lopressor for rate control  - rate controlled currently; continue to monitor on telemetry

## 2020-04-04 NOTE — PROGRESS NOTE ADULT - PROBLEM SELECTOR PLAN 3
Patient with a history of hypertension, noted to be hypotensive during this admission - likely multifactorial given sepsis from COVID infection vs sedation use for intubation vs paroxysmal a fib w/ RVR vs combination of above  - c/w lopressor for rate control  - c/w midodrine 10 mg TID; wean as tolerated

## 2020-04-04 NOTE — PROGRESS NOTE ADULT - SUBJECTIVE AND OBJECTIVE BOX
*incomplete note    [facility] MICU TO [facility] TELE TRANSFER NOTE    HOSPITAL COURSE:    OVERNIGHT EVENTS:    SUBJECTIVE/INTERVAL HPI: Patient was seen and examined by attending and fellow at bedside this morning with full PPE.    VITALS  Vital Signs Last 24 Hrs  T(C): 37.1 (04 Apr 2020 09:00), Max: 37.6 (03 Apr 2020 19:45)  T(F): 98.7 (04 Apr 2020 09:00), Max: 99.6 (03 Apr 2020 19:45)  HR: 126 (04 Apr 2020 09:00) (76 - 128)  BP: 105/65 (04 Apr 2020 09:00) (105/65 - 105/65)  BP(mean): 78 (04 Apr 2020 09:00) (78 - 78)  RR: 30 (04 Apr 2020 09:00) (24 - 41)  SpO2: 89% (04 Apr 2020 09:00) (86% - 96%)    I&O's Summary    03 Apr 2020 07:01  -  04 Apr 2020 07:00  --------------------------------------------------------  IN: 600.9 mL / OUT: 1205 mL / NET: -604.1 mL    04 Apr 2020 07:01  -  04 Apr 2020 12:25  --------------------------------------------------------  IN: 0 mL / OUT: 100 mL / NET: -100 mL        CAPILLARY BLOOD GLUCOSE      POCT Blood Glucose.: 137 mg/dL (04 Apr 2020 06:14)  POCT Blood Glucose.: 116 mg/dL (03 Apr 2020 22:27)  POCT Blood Glucose.: 117 mg/dL (03 Apr 2020 16:52)      PHYSICAL EXAM  Patient was seen and examined by attending and fellow.    MEDICATIONS  (STANDING):  chlorhexidine 0.12% Liquid 15 milliLiter(s) Oral Mucosa every 12 hours  chlorhexidine 2% Cloths 1 Application(s) Topical <User Schedule>  enoxaparin Injectable 80 milliGRAM(s) SubCutaneous every 12 hours  insulin lispro (HumaLOG) corrective regimen sliding scale   SubCutaneous every 6 hours  methylPREDNISolone sodium succinate Injectable 40 milliGRAM(s) IV Push every 12 hours  midodrine 10 milliGRAM(s) Oral every 8 hours    MEDICATIONS  (PRN):  acetaminophen    Suspension .. 650 milliGRAM(s) Enteral Tube every 6 hours PRN Temp greater or equal to 38C (100.4F), Moderate Pain (4 - 6)      LABS                        14.7   15.44 )-----------( 223      ( 04 Apr 2020 06:47 )             42.8     04-04    134<L>  |  104  |  38<H>  ----------------------------<  154<H>  4.3   |  18<L>  |  1.02    Ca    9.2      04 Apr 2020 06:45  Phos  3.6     04-04  Mg     1.9     04-04    TPro  6.8  /  Alb  2.9<L>  /  TBili  0.4  /  DBili  x   /  AST  121<H>  /  ALT  108<H>  /  AlkPhos  75  04-04    LIVER FUNCTIONS - ( 04 Apr 2020 06:45 )  Alb: 2.9 g/dL / Pro: 6.8 g/dL / ALK PHOS: 75 U/L / ALT: 108 U/L / AST: 121 U/L / GGT: x           PT/INR - ( 03 Apr 2020 06:52 )   PT: 13.5 sec;   INR: 1.18          PTT - ( 03 Apr 2020 06:52 )  PTT:57.7 sec          Radiology and other tests: Reviewed *incomplete note    [facility] MICU TO [facility] TELE TRANSFER NOTE    HOSPITAL COURSE:  79 y/o M with HTN, HLD, and BPH presenting with acute hyoxic respiratory failure (desaturating to 91% on NRB and tachypneic) intubated on arrival found to be COVID-19 positive. Patient s/p azithromycin and hydroxychloroquine outpatient. COVID managed with tocilizumab (4/2) and methylprednisolone 40mg IV BID (4/2-4/6). Patient successfully extubated 4/3, with decreasing levophed requirements, midodrine 10mg TID initiated. Patient now off of levophed. Patient medically optimized and stepped down to cardiac telemetry       OVERNIGHT EVENTS:    SUBJECTIVE/INTERVAL HPI: Patient was seen and examined by attending and fellow at bedside this morning with full PPE.    VITALS  Vital Signs Last 24 Hrs  T(C): 37.1 (04 Apr 2020 09:00), Max: 37.6 (03 Apr 2020 19:45)  T(F): 98.7 (04 Apr 2020 09:00), Max: 99.6 (03 Apr 2020 19:45)  HR: 126 (04 Apr 2020 09:00) (76 - 128)  BP: 105/65 (04 Apr 2020 09:00) (105/65 - 105/65)  BP(mean): 78 (04 Apr 2020 09:00) (78 - 78)  RR: 30 (04 Apr 2020 09:00) (24 - 41)  SpO2: 89% (04 Apr 2020 09:00) (86% - 96%)    I&O's Summary    03 Apr 2020 07:01  -  04 Apr 2020 07:00  --------------------------------------------------------  IN: 600.9 mL / OUT: 1205 mL / NET: -604.1 mL    04 Apr 2020 07:01  -  04 Apr 2020 12:25  --------------------------------------------------------  IN: 0 mL / OUT: 100 mL / NET: -100 mL        CAPILLARY BLOOD GLUCOSE      POCT Blood Glucose.: 137 mg/dL (04 Apr 2020 06:14)  POCT Blood Glucose.: 116 mg/dL (03 Apr 2020 22:27)  POCT Blood Glucose.: 117 mg/dL (03 Apr 2020 16:52)      PHYSICAL EXAM  Patient was seen and examined by attending and fellow.    MEDICATIONS  (STANDING):  chlorhexidine 0.12% Liquid 15 milliLiter(s) Oral Mucosa every 12 hours  chlorhexidine 2% Cloths 1 Application(s) Topical <User Schedule>  enoxaparin Injectable 80 milliGRAM(s) SubCutaneous every 12 hours  insulin lispro (HumaLOG) corrective regimen sliding scale   SubCutaneous every 6 hours  methylPREDNISolone sodium succinate Injectable 40 milliGRAM(s) IV Push every 12 hours  midodrine 10 milliGRAM(s) Oral every 8 hours    MEDICATIONS  (PRN):  acetaminophen    Suspension .. 650 milliGRAM(s) Enteral Tube every 6 hours PRN Temp greater or equal to 38C (100.4F), Moderate Pain (4 - 6)      LABS                        14.7   15.44 )-----------( 223      ( 04 Apr 2020 06:47 )             42.8     04-04    134<L>  |  104  |  38<H>  ----------------------------<  154<H>  4.3   |  18<L>  |  1.02    Ca    9.2      04 Apr 2020 06:45  Phos  3.6     04-04  Mg     1.9     04-04    TPro  6.8  /  Alb  2.9<L>  /  TBili  0.4  /  DBili  x   /  AST  121<H>  /  ALT  108<H>  /  AlkPhos  75  04-04    LIVER FUNCTIONS - ( 04 Apr 2020 06:45 )  Alb: 2.9 g/dL / Pro: 6.8 g/dL / ALK PHOS: 75 U/L / ALT: 108 U/L / AST: 121 U/L / GGT: x           PT/INR - ( 03 Apr 2020 06:52 )   PT: 13.5 sec;   INR: 1.18          PTT - ( 03 Apr 2020 06:52 )  PTT:57.7 sec          Radiology and other tests: Reviewed *incomplete note    [facility] COVID MICU TO [facility] TELE TRANSFER NOTE    HOSPITAL COURSE:  81 y/o M with HTN, HLD, and BPH presenting with acute hyoxic respiratory failure (desaturating to 91% on NRB and tachypneic) intubated on arrival found to be COVID-19 positive. Patient s/p azithromycin and hydroxychloroquine outpatient. COVID managed with tocilizumab (4/2) and methylprednisolone 40mg IV BID (4/2-4/6). Patient successfully extubated 4/3, with decreasing levophed requirements, midodrine 10mg TID initiated. Patient now off of levophed. Patient medically optimized and stepped down to cardiac telemetry       OVERNIGHT EVENTS: Still on NRB    SUBJECTIVE/INTERVAL HPI: Patient was seen and examined by attending and fellow at bedside this morning with full PPE.    VITALS  Vital Signs Last 24 Hrs  T(C): 37.1 (04 Apr 2020 09:00), Max: 37.6 (03 Apr 2020 19:45)  T(F): 98.7 (04 Apr 2020 09:00), Max: 99.6 (03 Apr 2020 19:45)  HR: 126 (04 Apr 2020 09:00) (76 - 128)  BP: 105/65 (04 Apr 2020 09:00) (105/65 - 105/65)  BP(mean): 78 (04 Apr 2020 09:00) (78 - 78)  RR: 30 (04 Apr 2020 09:00) (24 - 41)  SpO2: 89% (04 Apr 2020 09:00) (86% - 96%)    I&O's Summary    03 Apr 2020 07:01  -  04 Apr 2020 07:00  --------------------------------------------------------  IN: 600.9 mL / OUT: 1205 mL / NET: -604.1 mL    04 Apr 2020 07:01  -  04 Apr 2020 12:25  --------------------------------------------------------  IN: 0 mL / OUT: 100 mL / NET: -100 mL        CAPILLARY BLOOD GLUCOSE      POCT Blood Glucose.: 137 mg/dL (04 Apr 2020 06:14)  POCT Blood Glucose.: 116 mg/dL (03 Apr 2020 22:27)  POCT Blood Glucose.: 117 mg/dL (03 Apr 2020 16:52)      PHYSICAL EXAM  Patient was seen and examined by attending and fellow.    MEDICATIONS  (STANDING):  chlorhexidine 0.12% Liquid 15 milliLiter(s) Oral Mucosa every 12 hours  chlorhexidine 2% Cloths 1 Application(s) Topical <User Schedule>  enoxaparin Injectable 80 milliGRAM(s) SubCutaneous every 12 hours  insulin lispro (HumaLOG) corrective regimen sliding scale   SubCutaneous every 6 hours  methylPREDNISolone sodium succinate Injectable 40 milliGRAM(s) IV Push every 12 hours  midodrine 10 milliGRAM(s) Oral every 8 hours    MEDICATIONS  (PRN):  acetaminophen    Suspension .. 650 milliGRAM(s) Enteral Tube every 6 hours PRN Temp greater or equal to 38C (100.4F), Moderate Pain (4 - 6)      LABS                        14.7   15.44 )-----------( 223      ( 04 Apr 2020 06:47 )             42.8     04-04    134<L>  |  104  |  38<H>  ----------------------------<  154<H>  4.3   |  18<L>  |  1.02    Ca    9.2      04 Apr 2020 06:45  Phos  3.6     04-04  Mg     1.9     04-04    TPro  6.8  /  Alb  2.9<L>  /  TBili  0.4  /  DBili  x   /  AST  121<H>  /  ALT  108<H>  /  AlkPhos  75  04-04    LIVER FUNCTIONS - ( 04 Apr 2020 06:45 )  Alb: 2.9 g/dL / Pro: 6.8 g/dL / ALK PHOS: 75 U/L / ALT: 108 U/L / AST: 121 U/L / GGT: x           PT/INR - ( 03 Apr 2020 06:52 )   PT: 13.5 sec;   INR: 1.18          PTT - ( 03 Apr 2020 06:52 )  PTT:57.7 sec          Radiology and other tests: Reviewed

## 2020-04-04 NOTE — PROGRESS NOTE ADULT - PROBLEM SELECTOR PLAN 1
Acute hypoxic RF 2/2 COVID-19 infection. Patient intubated on arrival. Completed azithromycin and hydroxychloroquine outpatient.   - s/p tociluzumab 4/2  - c/w methylprednisolone 40mg IV x5 days (4/2-4/6)   - successfully extubated to NRB (4/3)  - step down to telemetry  - will consider xanax/PRN benzo for tachypnea given underlying anxiety component; on exam patient without accesssory muscle use, speaking in full sentences and mobilizing OOBTC indicating that underlying process may be related more to anxiety surrounding ddx versus worsening respiratory status

## 2020-04-05 LAB
ALBUMIN SERPL ELPH-MCNC: 2.8 G/DL — LOW (ref 3.3–5)
ALP SERPL-CCNC: 59 U/L — SIGNIFICANT CHANGE UP (ref 40–120)
ALT FLD-CCNC: 87 U/L — HIGH (ref 10–45)
ANION GAP SERPL CALC-SCNC: 14 MMOL/L — SIGNIFICANT CHANGE UP (ref 5–17)
AST SERPL-CCNC: 63 U/L — HIGH (ref 10–40)
BASOPHILS # BLD AUTO: 0.01 K/UL — SIGNIFICANT CHANGE UP (ref 0–0.2)
BASOPHILS NFR BLD AUTO: 0.1 % — SIGNIFICANT CHANGE UP (ref 0–2)
BILIRUB SERPL-MCNC: 0.4 MG/DL — SIGNIFICANT CHANGE UP (ref 0.2–1.2)
BUN SERPL-MCNC: 38 MG/DL — HIGH (ref 7–23)
CALCIUM SERPL-MCNC: 8.7 MG/DL — SIGNIFICANT CHANGE UP (ref 8.4–10.5)
CHLORIDE SERPL-SCNC: 108 MMOL/L — SIGNIFICANT CHANGE UP (ref 96–108)
CO2 SERPL-SCNC: 19 MMOL/L — LOW (ref 22–31)
CREAT SERPL-MCNC: 0.88 MG/DL — SIGNIFICANT CHANGE UP (ref 0.5–1.3)
CRP SERPL-MCNC: 5.94 MG/DL — HIGH (ref 0–0.4)
EOSINOPHIL # BLD AUTO: 0 K/UL — SIGNIFICANT CHANGE UP (ref 0–0.5)
EOSINOPHIL NFR BLD AUTO: 0 % — SIGNIFICANT CHANGE UP (ref 0–6)
FERRITIN SERPL-MCNC: 789 NG/ML — HIGH (ref 30–400)
G6PD RBC-CCNC: 16.2 U/G HGB — SIGNIFICANT CHANGE UP (ref 7–20.5)
GLUCOSE BLDC GLUCOMTR-MCNC: 121 MG/DL — HIGH (ref 70–99)
GLUCOSE SERPL-MCNC: 138 MG/DL — HIGH (ref 70–99)
HCT VFR BLD CALC: 45.9 % — SIGNIFICANT CHANGE UP (ref 39–50)
HGB BLD-MCNC: 15.2 G/DL — SIGNIFICANT CHANGE UP (ref 13–17)
IMM GRANULOCYTES NFR BLD AUTO: 0.7 % — SIGNIFICANT CHANGE UP (ref 0–1.5)
INR BLD: 1.31 — HIGH (ref 0.88–1.16)
LYMPHOCYTES # BLD AUTO: 0.44 K/UL — LOW (ref 1–3.3)
LYMPHOCYTES # BLD AUTO: 5 % — LOW (ref 13–44)
MAGNESIUM SERPL-MCNC: 2 MG/DL — SIGNIFICANT CHANGE UP (ref 1.6–2.6)
MCHC RBC-ENTMCNC: 28.5 PG — SIGNIFICANT CHANGE UP (ref 27–34)
MCHC RBC-ENTMCNC: 33.1 GM/DL — SIGNIFICANT CHANGE UP (ref 32–36)
MCV RBC AUTO: 86 FL — SIGNIFICANT CHANGE UP (ref 80–100)
MONOCYTES # BLD AUTO: 0.53 K/UL — SIGNIFICANT CHANGE UP (ref 0–0.9)
MONOCYTES NFR BLD AUTO: 6 % — SIGNIFICANT CHANGE UP (ref 2–14)
NEUTROPHILS # BLD AUTO: 7.77 K/UL — HIGH (ref 1.8–7.4)
NEUTROPHILS NFR BLD AUTO: 88.2 % — HIGH (ref 43–77)
NRBC # BLD: 0 /100 WBCS — SIGNIFICANT CHANGE UP (ref 0–0)
PHOSPHATE SERPL-MCNC: 3.3 MG/DL — SIGNIFICANT CHANGE UP (ref 2.5–4.5)
PLATELET # BLD AUTO: 207 K/UL — SIGNIFICANT CHANGE UP (ref 150–400)
POTASSIUM SERPL-MCNC: 4.2 MMOL/L — SIGNIFICANT CHANGE UP (ref 3.5–5.3)
POTASSIUM SERPL-SCNC: 4.2 MMOL/L — SIGNIFICANT CHANGE UP (ref 3.5–5.3)
PROT SERPL-MCNC: 6.6 G/DL — SIGNIFICANT CHANGE UP (ref 6–8.3)
PROTHROM AB SERPL-ACNC: 15.1 SEC — HIGH (ref 10–12.9)
RBC # BLD: 5.34 M/UL — SIGNIFICANT CHANGE UP (ref 4.2–5.8)
RBC # FLD: 14.6 % — HIGH (ref 10.3–14.5)
SODIUM SERPL-SCNC: 141 MMOL/L — SIGNIFICANT CHANGE UP (ref 135–145)
WBC # BLD: 8.81 K/UL — SIGNIFICANT CHANGE UP (ref 3.8–10.5)
WBC # FLD AUTO: 8.81 K/UL — SIGNIFICANT CHANGE UP (ref 3.8–10.5)

## 2020-04-05 PROCEDURE — 71045 X-RAY EXAM CHEST 1 VIEW: CPT | Mod: 26

## 2020-04-05 RX ORDER — DEXTROSE 50 % IN WATER 50 %
15 SYRINGE (ML) INTRAVENOUS ONCE
Refills: 0 | Status: DISCONTINUED | OUTPATIENT
Start: 2020-04-05 | End: 2020-04-24

## 2020-04-05 RX ORDER — GLUCAGON INJECTION, SOLUTION 0.5 MG/.1ML
1 INJECTION, SOLUTION SUBCUTANEOUS ONCE
Refills: 0 | Status: DISCONTINUED | OUTPATIENT
Start: 2020-04-05 | End: 2020-04-24

## 2020-04-05 RX ORDER — FUROSEMIDE 40 MG
40 TABLET ORAL ONCE
Refills: 0 | Status: COMPLETED | OUTPATIENT
Start: 2020-04-05 | End: 2020-04-05

## 2020-04-05 RX ORDER — DEXTROSE 50 % IN WATER 50 %
25 SYRINGE (ML) INTRAVENOUS ONCE
Refills: 0 | Status: DISCONTINUED | OUTPATIENT
Start: 2020-04-05 | End: 2020-04-24

## 2020-04-05 RX ORDER — DEXTROSE 50 % IN WATER 50 %
12.5 SYRINGE (ML) INTRAVENOUS ONCE
Refills: 0 | Status: DISCONTINUED | OUTPATIENT
Start: 2020-04-05 | End: 2020-04-24

## 2020-04-05 RX ORDER — SODIUM CHLORIDE 9 MG/ML
1000 INJECTION, SOLUTION INTRAVENOUS
Refills: 0 | Status: DISCONTINUED | OUTPATIENT
Start: 2020-04-05 | End: 2020-04-24

## 2020-04-05 RX ORDER — INSULIN LISPRO 100/ML
VIAL (ML) SUBCUTANEOUS
Refills: 0 | Status: DISCONTINUED | OUTPATIENT
Start: 2020-04-05 | End: 2020-04-13

## 2020-04-05 RX ADMIN — ENOXAPARIN SODIUM 80 MILLIGRAM(S): 100 INJECTION SUBCUTANEOUS at 06:43

## 2020-04-05 RX ADMIN — Medication 25 MILLIGRAM(S): at 06:43

## 2020-04-05 RX ADMIN — Medication 40 MILLIGRAM(S): at 06:42

## 2020-04-05 RX ADMIN — MIDODRINE HYDROCHLORIDE 10 MILLIGRAM(S): 2.5 TABLET ORAL at 06:43

## 2020-04-05 RX ADMIN — MIDODRINE HYDROCHLORIDE 10 MILLIGRAM(S): 2.5 TABLET ORAL at 14:30

## 2020-04-05 RX ADMIN — CHLORHEXIDINE GLUCONATE 1 APPLICATION(S): 213 SOLUTION TOPICAL at 06:42

## 2020-04-05 RX ADMIN — Medication 25 MILLIGRAM(S): at 17:53

## 2020-04-05 RX ADMIN — ENOXAPARIN SODIUM 80 MILLIGRAM(S): 100 INJECTION SUBCUTANEOUS at 17:52

## 2020-04-05 RX ADMIN — Medication 40 MILLIGRAM(S): at 17:53

## 2020-04-05 RX ADMIN — Medication 40 MILLIGRAM(S): at 12:52

## 2020-04-05 RX ADMIN — Medication 2: at 22:37

## 2020-04-05 RX ADMIN — MIDODRINE HYDROCHLORIDE 10 MILLIGRAM(S): 2.5 TABLET ORAL at 22:36

## 2020-04-05 NOTE — PROGRESS NOTE ADULT - SUBJECTIVE AND OBJECTIVE BOX
Patient examined at bedside with no acute overnight events. Feels like he cant take in deep breaths. Otherwise denies any ROS.      T(C): 35.7 (04-05-20 @ 10:00), Max: 37.8 (04-04-20 @ 13:00)  HR: 74 (04-05-20 @ 10:00) (71 - 132)  BP: 125/66 (04-05-20 @ 10:00) (97/72 - 155/88)  RR: 32 (04-05-20 @ 10:00) (32 - 48)  SpO2: 88% (04-05-20 @ 10:00) (83% - 91%)  Wt(kg): --Vital Signs Last 24 Hrs    Review of Systems:  -All other ROS negative, except those noted in HPI    PHYSICAL EXAM:  GENERAL: laying in bed, NRB in place  HEAD:  Atraumatic, Normocephalic  EYES: EOMI, PERRLA, conjunctiva and sclera clear  ENMT: Moist mucous membranes, Good dentition, No lesions  NECK: Supple, No JVD,  NERVOUS SYSTEM:  Alert & Oriented X3, Good concentration  CHEST/LUNG: tachypneic (mid 20s), with mild expiratory wheezes, NRB in place satting around 90%  HEART: Regular rate and rhythm; No murmurs, rubs, or gallops  ABDOMEN: Soft, Nontender, Nondistended; Bowel sounds present  EXTREMITIES:  2+ Peripheral Pulses, No clubbing, cyanosis, or edema  LYMPH: No lymphadenopathy noted  SKIN: No rashes or lesions    acetaminophen    Suspension .. 650 milliGRAM(s) Enteral Tube every 6 hours PRN  chlorhexidine 2% Cloths 1 Application(s) Topical <User Schedule>  enoxaparin Injectable 80 milliGRAM(s) SubCutaneous every 12 hours  furosemide    Tablet 40 milliGRAM(s) Oral once  insulin lispro (HumaLOG) corrective regimen sliding scale   SubCutaneous every 6 hours  methylPREDNISolone sodium succinate Injectable 40 milliGRAM(s) IV Push every 12 hours  metoprolol tartrate 25 milliGRAM(s) Oral every 12 hours  midodrine 10 milliGRAM(s) Oral every 8 hours      LABS:                        15.2   8.81  )-----------( 207      ( 05 Apr 2020 07:10 )             45.9     04-05    141  |  108  |  38<H>  ----------------------------<  138<H>  4.2   |  19<L>  |  0.88    Ca    8.7      05 Apr 2020 07:10  Phos  3.3     04-05  Mg     2.0     04-05    TPro  6.6  /  Alb  2.8<L>  /  TBili  0.4  /  DBili  x   /  AST  63<H>  /  ALT  87<H>  /  AlkPhos  59  04-05        CAPILLARY BLOOD GLUCOSE      POCT Blood Glucose.: 174 mg/dL (05 Apr 2020 10:33)  POCT Blood Glucose.: 121 mg/dL (05 Apr 2020 06:24)  POCT Blood Glucose.: 149 mg/dL (04 Apr 2020 23:31)  POCT Blood Glucose.: 132 mg/dL (04 Apr 2020 17:14)  POCT Blood Glucose.: 127 mg/dL (04 Apr 2020 13:16)

## 2020-04-05 NOTE — PROGRESS NOTE ADULT - ASSESSMENT
79 yo M h/o BPH, HLD first had fever on 3/31 started plaquenil and azithro then had 02 sat in low 80s and came to ED then intubated. Found to have new Afib with RVR. Now s/p extubation on 4/3 and stable for stepdown to COVID-telemetry

## 2020-04-05 NOTE — PROGRESS NOTE ADULT - PROBLEM SELECTOR PLAN 1
Acute hypoxic RF 2/2 COVID-19 infection. Patient intubated on arrival. Completed azithromycin and hydroxychloroquine outpatient.   - s/p tociluzumab 4/2  - c/w methylprednisolone 40mg IV x5 days (4/2-4/6)   - successfully extubated to NRB (4/3)  - given 1 time lasix 40 mg IVP today  - will consider xanax/PRN benzo for tachypnea given underlying anxiety component

## 2020-04-05 NOTE — PROGRESS NOTE ADULT - PROBLEM SELECTOR PLAN 2
Pt positive for COVID-19. CXR with bilateral infiltrates  Now with downtrending leukocytosis and downtrending inflammatory biomarkers (D-dimer, CRP). Stable procalcitonin.   - s/p COVID bundle: azithromycin, hydroxychloroquine outpatient  - s/p tociluzumab (4/2)   - c/w methylprednisolone 40mg IV BID (4/2-4/6)   - Blood cx  NGTD   - Tylenol PRN for fever  - Robitussin PRN for cough  - Avoid NSAIDs, ACE/ARB  - no nebulizers, MDI only  - COVID Isolation precautions: contact and droplet   - continue to monitor daily CBC w/ differential, CMP, Mg, Phos, CRP, procalcitonin, and ABG  - q3d labs: ESR, Tcell subset, d-dimer, LDH, ferritin, CK, trop, coags

## 2020-04-06 LAB
ALBUMIN SERPL ELPH-MCNC: 3 G/DL — LOW (ref 3.3–5)
ALP SERPL-CCNC: 56 U/L — SIGNIFICANT CHANGE UP (ref 40–120)
ALT FLD-CCNC: 126 U/L — HIGH (ref 10–45)
ANION GAP SERPL CALC-SCNC: 14 MMOL/L — SIGNIFICANT CHANGE UP (ref 5–17)
AST SERPL-CCNC: 136 U/L — HIGH (ref 10–40)
BASOPHILS # BLD AUTO: 0.01 K/UL — SIGNIFICANT CHANGE UP (ref 0–0.2)
BASOPHILS NFR BLD AUTO: 0.1 % — SIGNIFICANT CHANGE UP (ref 0–2)
BILIRUB SERPL-MCNC: 0.4 MG/DL — SIGNIFICANT CHANGE UP (ref 0.2–1.2)
BUN SERPL-MCNC: 31 MG/DL — HIGH (ref 7–23)
CALCIUM SERPL-MCNC: 8.6 MG/DL — SIGNIFICANT CHANGE UP (ref 8.4–10.5)
CHLORIDE SERPL-SCNC: 103 MMOL/L — SIGNIFICANT CHANGE UP (ref 96–108)
CO2 SERPL-SCNC: 26 MMOL/L — SIGNIFICANT CHANGE UP (ref 22–31)
CREAT SERPL-MCNC: 0.88 MG/DL — SIGNIFICANT CHANGE UP (ref 0.5–1.3)
CRP SERPL-MCNC: 3.92 MG/DL — HIGH (ref 0–0.4)
CULTURE RESULTS: NO GROWTH — SIGNIFICANT CHANGE UP
CULTURE RESULTS: NO GROWTH — SIGNIFICANT CHANGE UP
D DIMER BLD IA.RAPID-MCNC: 523 NG/ML DDU — HIGH
EOSINOPHIL # BLD AUTO: 0 K/UL — SIGNIFICANT CHANGE UP (ref 0–0.5)
EOSINOPHIL NFR BLD AUTO: 0 % — SIGNIFICANT CHANGE UP (ref 0–6)
FERRITIN SERPL-MCNC: 788 NG/ML — HIGH (ref 30–400)
GLUCOSE SERPL-MCNC: 112 MG/DL — HIGH (ref 70–99)
HBA1C BLD-MCNC: 6.3 % — HIGH (ref 4–5.6)
HCT VFR BLD CALC: 42.6 % — SIGNIFICANT CHANGE UP (ref 39–50)
HGB BLD-MCNC: 13.9 G/DL — SIGNIFICANT CHANGE UP (ref 13–17)
IMM GRANULOCYTES NFR BLD AUTO: 1.1 % — SIGNIFICANT CHANGE UP (ref 0–1.5)
LYMPHOCYTES # BLD AUTO: 0.6 K/UL — LOW (ref 1–3.3)
LYMPHOCYTES # BLD AUTO: 7.1 % — LOW (ref 13–44)
MAGNESIUM SERPL-MCNC: 2.1 MG/DL — SIGNIFICANT CHANGE UP (ref 1.6–2.6)
MCHC RBC-ENTMCNC: 28.4 PG — SIGNIFICANT CHANGE UP (ref 27–34)
MCHC RBC-ENTMCNC: 32.6 GM/DL — SIGNIFICANT CHANGE UP (ref 32–36)
MCV RBC AUTO: 86.9 FL — SIGNIFICANT CHANGE UP (ref 80–100)
MONOCYTES # BLD AUTO: 0.76 K/UL — SIGNIFICANT CHANGE UP (ref 0–0.9)
MONOCYTES NFR BLD AUTO: 8.9 % — SIGNIFICANT CHANGE UP (ref 2–14)
NEUTROPHILS # BLD AUTO: 7.04 K/UL — SIGNIFICANT CHANGE UP (ref 1.8–7.4)
NEUTROPHILS NFR BLD AUTO: 82.8 % — HIGH (ref 43–77)
NRBC # BLD: 0 /100 WBCS — SIGNIFICANT CHANGE UP (ref 0–0)
PHOSPHATE SERPL-MCNC: 3.1 MG/DL — SIGNIFICANT CHANGE UP (ref 2.5–4.5)
PLATELET # BLD AUTO: 206 K/UL — SIGNIFICANT CHANGE UP (ref 150–400)
POTASSIUM SERPL-MCNC: 4.2 MMOL/L — SIGNIFICANT CHANGE UP (ref 3.5–5.3)
POTASSIUM SERPL-SCNC: 4.2 MMOL/L — SIGNIFICANT CHANGE UP (ref 3.5–5.3)
PROT SERPL-MCNC: 6.6 G/DL — SIGNIFICANT CHANGE UP (ref 6–8.3)
RBC # BLD: 4.9 M/UL — SIGNIFICANT CHANGE UP (ref 4.2–5.8)
RBC # FLD: 14.5 % — SIGNIFICANT CHANGE UP (ref 10.3–14.5)
SODIUM SERPL-SCNC: 143 MMOL/L — SIGNIFICANT CHANGE UP (ref 135–145)
SPECIMEN SOURCE: SIGNIFICANT CHANGE UP
SPECIMEN SOURCE: SIGNIFICANT CHANGE UP
WBC # BLD: 8.5 K/UL — SIGNIFICANT CHANGE UP (ref 3.8–10.5)
WBC # FLD AUTO: 8.5 K/UL — SIGNIFICANT CHANGE UP (ref 3.8–10.5)

## 2020-04-06 RX ORDER — FUROSEMIDE 40 MG
20 TABLET ORAL ONCE
Refills: 0 | Status: COMPLETED | OUTPATIENT
Start: 2020-04-06 | End: 2020-04-06

## 2020-04-06 RX ADMIN — CHLORHEXIDINE GLUCONATE 1 APPLICATION(S): 213 SOLUTION TOPICAL at 06:25

## 2020-04-06 RX ADMIN — MIDODRINE HYDROCHLORIDE 10 MILLIGRAM(S): 2.5 TABLET ORAL at 08:51

## 2020-04-06 RX ADMIN — Medication 25 MILLIGRAM(S): at 06:24

## 2020-04-06 RX ADMIN — Medication 20 MILLIGRAM(S): at 09:39

## 2020-04-06 RX ADMIN — MIDODRINE HYDROCHLORIDE 10 MILLIGRAM(S): 2.5 TABLET ORAL at 17:10

## 2020-04-06 RX ADMIN — ENOXAPARIN SODIUM 80 MILLIGRAM(S): 100 INJECTION SUBCUTANEOUS at 17:10

## 2020-04-06 RX ADMIN — ENOXAPARIN SODIUM 80 MILLIGRAM(S): 100 INJECTION SUBCUTANEOUS at 06:24

## 2020-04-06 RX ADMIN — Medication 40 MILLIGRAM(S): at 17:10

## 2020-04-06 RX ADMIN — Medication 40 MILLIGRAM(S): at 06:24

## 2020-04-06 NOTE — PROGRESS NOTE ADULT - PROVIDER SPECIALTY LIST ADULT
Internal Medicine 36 yo man with hx of gout presents with severe lower abdominal and suprapubic pain.  He was found to have pancreatitis on CT and hyperlipidemia so he was treated for pancreatitis.  His abdominal persisted for about a week and he was monitored in the MICU.  He was also found to have new-onset diabetes so he was started on an insulin drip which eventually was tapered down to basal-bolus insulin.  He was transferred to the general floor and his first day here his abdominal pain suddenly improved.  He was deemed safe for discharge with close follow-up for his diabetes.

## 2020-04-06 NOTE — PROGRESS NOTE ADULT - PROBLEM SELECTOR PLAN 1
Acute hypoxic RF 2/2 COVID-19 infection. Patient intubated on arrival. Completed azithromycin and hydroxychloroquine outpatient.   - s/p tociluzumab 4/2  - c/w methylprednisolone 40mg IV x5 days (4/2-4/6)   - successfully extubated to NRB (4/3)-->needs to be proned and sats improve to mid 90s. Patient is still low 80s when on his back  - given 1 time lasix 20 mg IVP  - will consider xanax/PRN benzo for tachypnea given underlying anxiety component

## 2020-04-06 NOTE — PROGRESS NOTE ADULT - PROBLEM SELECTOR PLAN 2
Pt positive for COVID-19. CXR with bilateral infiltrates  Now with downtrending leukocytosis and downtrending inflammatory biomarkers (D-dimer, CRP). Stable procalcitonin.   - s/p COVID bundle: azithromycin, hydroxychloroquine outpatient  - s/p tociluzumab (4/2)   - c/w methylprednisolone 40mg IV BID (4/2-4/6)   - Blood cx  NGTD   - Tylenol PRN for fever  - Robitussin PRN for cough  - Avoid NSAIDs, ACE/ARB  - no nebulizers, MDI only  - COVID Isolation precautions: contact and droplet   - continue to monitor daily CBC w/ differential, CMP, Mg, Phos, CRP, procalcitonin, and ABG  - q3d labs: ESR, Tcell subset, d-dimer, LDH, ferritin, CK, trop, coags Presented with sepsis 2/2 to COVID. confirmed positive for COVID-19. CXR with bilateral infiltrates representing COVID viral pneumonia.  Now with downtrending leukocytosis and downtrending inflammatory biomarkers (D-dimer, CRP). Stable procalcitonin.   - s/p COVID bundle: azithromycin, hydroxychloroquine outpatient  - s/p tociluzumab (4/2)   - c/w methylprednisolone 40mg IV BID (4/2-4/6)   - Blood cx  NGTD   - Tylenol PRN for fever  - Robitussin PRN for cough  - Avoid NSAIDs, ACE/ARB  - no nebulizers, MDI only  - COVID Isolation precautions: contact and droplet   - continue to monitor daily CBC w/ differential, CMP, Mg, Phos, CRP, procalcitonin, and ABG  - q3d labs: ESR, Tcell subset, d-dimer, LDH, ferritin, CK, trop, coags

## 2020-04-06 NOTE — PROGRESS NOTE ADULT - SUBJECTIVE AND OBJECTIVE BOX
Patient examined at the bedside with no acute events overnight. Patient still reports a cough. Denies any SOB, fever, chills, CP, nausea, vomiting.    T(C): 36 (04-06-20 @ 09:10), Max: 37.1 (04-06-20 @ 06:20)  HR: 63 (04-06-20 @ 09:10) (57 - 74)  BP: 141/65 (04-06-20 @ 09:10) (125/62 - 147/72)  RR: 25 (04-06-20 @ 09:10) (22 - 32)  SpO2: 97% (04-06-20 @ 09:10) (86% - 97%)  Wt(kg): --Vital Signs Last 24 Hrs    Review of Systems:  -All other ROS negative, except those noted in HPI    PHYSICAL EXAM:  GENERAL: NAD, sitting up in bed eating  HEAD:  Atraumatic, Normocephalic  EYES: EOMI, PERRLA, conjunctiva and sclera clear  ENMT: Moist mucous membranes, Good dentition, No lesions  NECK: Supple, No JVD,   NERVOUS SYSTEM:  Alert & Oriented X3, Good concentration;   CHEST/LUNG: rhonchorous throughout, less tachypneic today, no accessory muscle use, on NRB  HEART: Regular rate and rhythm; No murmurs, rubs, or gallops  ABDOMEN: Soft, Nontender, Nondistended; Bowel sounds present  EXTREMITIES:  2+ Peripheral Pulses, No clubbing, cyanosis, or edema  LYMPH: No lymphadenopathy noted  SKIN: No rashes or lesions    acetaminophen    Suspension .. 650 milliGRAM(s) Enteral Tube every 6 hours PRN  chlorhexidine 2% Cloths 1 Application(s) Topical <User Schedule>  dextrose 40% Gel 15 Gram(s) Oral once PRN  dextrose 5%. 1000 milliLiter(s) IV Continuous <Continuous>  dextrose 50% Injectable 12.5 Gram(s) IV Push once  dextrose 50% Injectable 25 Gram(s) IV Push once  dextrose 50% Injectable 25 Gram(s) IV Push once  enoxaparin Injectable 80 milliGRAM(s) SubCutaneous every 12 hours  glucagon  Injectable 1 milliGRAM(s) IntraMuscular once PRN  insulin lispro (HumaLOG) corrective regimen sliding scale   SubCutaneous Before meals and at bedtime  methylPREDNISolone sodium succinate Injectable 40 milliGRAM(s) IV Push every 12 hours  metoprolol tartrate 25 milliGRAM(s) Oral every 12 hours  midodrine 10 milliGRAM(s) Oral every 8 hours      LABS:                        13.9   8.50  )-----------( 206      ( 06 Apr 2020 07:36 )             42.6     04-06    143  |  103  |  31<H>  ----------------------------<  112<H>  4.2   |  26  |  0.88    Ca    8.6      06 Apr 2020 07:36  Phos  3.1     04-06  Mg     2.1     04-06    TPro  6.6  /  Alb  3.0<L>  /  TBili  0.4  /  DBili  x   /  AST  136<H>  /  ALT  126<H>  /  AlkPhos  56  04-06    PT/INR - ( 05 Apr 2020 16:03 )   PT: 15.1 sec;   INR: 1.31              CAPILLARY BLOOD GLUCOSE      POCT Blood Glucose.: 120 mg/dL (06 Apr 2020 06:43)  POCT Blood Glucose.: 152 mg/dL (05 Apr 2020 21:59)  POCT Blood Glucose.: 115 mg/dL (05 Apr 2020 18:12)  POCT Blood Glucose.: 174 mg/dL (05 Apr 2020 10:33)

## 2020-04-07 LAB
A-TUMOR NECROSIS FACT SERPL-MCNC: <5 PG/ML — SIGNIFICANT CHANGE UP
ALBUMIN SERPL ELPH-MCNC: 3.4 G/DL — SIGNIFICANT CHANGE UP (ref 3.3–5)
ALP SERPL-CCNC: SIGNIFICANT CHANGE UP U/L (ref 40–120)
ALT FLD-CCNC: SIGNIFICANT CHANGE UP U/L (ref 10–45)
ANION GAP SERPL CALC-SCNC: 15 MMOL/L — SIGNIFICANT CHANGE UP (ref 5–17)
ANION GAP SERPL CALC-SCNC: 18 MMOL/L — HIGH (ref 5–17)
AST SERPL-CCNC: SIGNIFICANT CHANGE UP U/L (ref 10–40)
BASOPHILS # BLD AUTO: 0.02 K/UL — SIGNIFICANT CHANGE UP (ref 0–0.2)
BASOPHILS NFR BLD AUTO: 0.2 % — SIGNIFICANT CHANGE UP (ref 0–2)
BILIRUB SERPL-MCNC: 0.6 MG/DL — SIGNIFICANT CHANGE UP (ref 0.2–1.2)
BUN SERPL-MCNC: 31 MG/DL — HIGH (ref 7–23)
BUN SERPL-MCNC: 32 MG/DL — HIGH (ref 7–23)
CALCIUM SERPL-MCNC: 8.9 MG/DL — SIGNIFICANT CHANGE UP (ref 8.4–10.5)
CALCIUM SERPL-MCNC: 9 MG/DL — SIGNIFICANT CHANGE UP (ref 8.4–10.5)
CHLORIDE SERPL-SCNC: 102 MMOL/L — SIGNIFICANT CHANGE UP (ref 96–108)
CHLORIDE SERPL-SCNC: 99 MMOL/L — SIGNIFICANT CHANGE UP (ref 96–108)
CO2 SERPL-SCNC: 20 MMOL/L — LOW (ref 22–31)
CO2 SERPL-SCNC: 24 MMOL/L — SIGNIFICANT CHANGE UP (ref 22–31)
CREAT SERPL-MCNC: 0.72 MG/DL — SIGNIFICANT CHANGE UP (ref 0.5–1.3)
CREAT SERPL-MCNC: 0.78 MG/DL — SIGNIFICANT CHANGE UP (ref 0.5–1.3)
CRP SERPL-MCNC: 2.37 MG/DL — HIGH (ref 0–0.4)
D DIMER BLD IA.RAPID-MCNC: 829 NG/ML DDU — HIGH
EOSINOPHIL # BLD AUTO: 0.03 K/UL — SIGNIFICANT CHANGE UP (ref 0–0.5)
EOSINOPHIL NFR BLD AUTO: 0.3 % — SIGNIFICANT CHANGE UP (ref 0–6)
FERRITIN SERPL-MCNC: 754 NG/ML — HIGH (ref 30–400)
GLUCOSE SERPL-MCNC: 107 MG/DL — HIGH (ref 70–99)
GLUCOSE SERPL-MCNC: 131 MG/DL — HIGH (ref 70–99)
HCT VFR BLD CALC: 47.5 % — SIGNIFICANT CHANGE UP (ref 39–50)
HGB BLD-MCNC: 15.4 G/DL — SIGNIFICANT CHANGE UP (ref 13–17)
IL10 SERPL-MCNC: 21 PG/ML — HIGH
IL12 SERPL-MCNC: <5 PG/ML — SIGNIFICANT CHANGE UP
IL13 SERPL-MCNC: <5 PG/ML — SIGNIFICANT CHANGE UP
IL2 SERPL-MCNC: 1721 PG/ML — HIGH
IL2 SERPL-MCNC: <5 PG/ML — SIGNIFICANT CHANGE UP
IL4 SERPL-MCNC: <5 PG/ML — SIGNIFICANT CHANGE UP
IL6 SERPL-MCNC: 214 PG/ML — HIGH
IL8 SERPL-MCNC: <5 PG/ML — SIGNIFICANT CHANGE UP
IMM GRANULOCYTES NFR BLD AUTO: 1.1 % — SIGNIFICANT CHANGE UP (ref 0–1.5)
INTERFERON GAMMA: 6 PG/ML — HIGH
INTERLEUKIN 1 BETA: <5 PG/ML — SIGNIFICANT CHANGE UP
INTERLEUKIN 17: <5 PG/ML — SIGNIFICANT CHANGE UP
INTERLEUKIN 5: <5 PG/ML — SIGNIFICANT CHANGE UP
LYMPHOCYTES # BLD AUTO: 0.55 K/UL — LOW (ref 1–3.3)
LYMPHOCYTES # BLD AUTO: 5.5 % — LOW (ref 13–44)
MAGNESIUM SERPL-MCNC: 2.1 MG/DL — SIGNIFICANT CHANGE UP (ref 1.6–2.6)
MCHC RBC-ENTMCNC: 28.4 PG — SIGNIFICANT CHANGE UP (ref 27–34)
MCHC RBC-ENTMCNC: 32.4 GM/DL — SIGNIFICANT CHANGE UP (ref 32–36)
MCV RBC AUTO: 87.6 FL — SIGNIFICANT CHANGE UP (ref 80–100)
MONOCYTES # BLD AUTO: 0.65 K/UL — SIGNIFICANT CHANGE UP (ref 0–0.9)
MONOCYTES NFR BLD AUTO: 6.5 % — SIGNIFICANT CHANGE UP (ref 2–14)
NEUTROPHILS # BLD AUTO: 8.67 K/UL — HIGH (ref 1.8–7.4)
NEUTROPHILS NFR BLD AUTO: 86.4 % — HIGH (ref 43–77)
NRBC # BLD: 0 /100 WBCS — SIGNIFICANT CHANGE UP (ref 0–0)
PHOSPHATE SERPL-MCNC: 3 MG/DL — SIGNIFICANT CHANGE UP (ref 2.5–4.5)
PLATELET # BLD AUTO: 223 K/UL — SIGNIFICANT CHANGE UP (ref 150–400)
POTASSIUM SERPL-MCNC: 4.5 MMOL/L — SIGNIFICANT CHANGE UP (ref 3.5–5.3)
POTASSIUM SERPL-MCNC: SIGNIFICANT CHANGE UP MMOL/L (ref 3.5–5.3)
POTASSIUM SERPL-SCNC: 4.5 MMOL/L — SIGNIFICANT CHANGE UP (ref 3.5–5.3)
POTASSIUM SERPL-SCNC: SIGNIFICANT CHANGE UP MMOL/L (ref 3.5–5.3)
PROT SERPL-MCNC: 7.3 G/DL — SIGNIFICANT CHANGE UP (ref 6–8.3)
RBC # BLD: 5.42 M/UL — SIGNIFICANT CHANGE UP (ref 4.2–5.8)
RBC # FLD: 14.4 % — SIGNIFICANT CHANGE UP (ref 10.3–14.5)
SODIUM SERPL-SCNC: 137 MMOL/L — SIGNIFICANT CHANGE UP (ref 135–145)
SODIUM SERPL-SCNC: 141 MMOL/L — SIGNIFICANT CHANGE UP (ref 135–145)
WBC # BLD: 10.03 K/UL — SIGNIFICANT CHANGE UP (ref 3.8–10.5)
WBC # FLD AUTO: 10.03 K/UL — SIGNIFICANT CHANGE UP (ref 3.8–10.5)

## 2020-04-07 PROCEDURE — 71045 X-RAY EXAM CHEST 1 VIEW: CPT | Mod: 26

## 2020-04-07 PROCEDURE — 99291 CRITICAL CARE FIRST HOUR: CPT

## 2020-04-07 RX ORDER — MIDODRINE HYDROCHLORIDE 2.5 MG/1
5 TABLET ORAL EVERY 8 HOURS
Refills: 0 | Status: DISCONTINUED | OUTPATIENT
Start: 2020-04-07 | End: 2020-04-11

## 2020-04-07 RX ADMIN — CHLORHEXIDINE GLUCONATE 1 APPLICATION(S): 213 SOLUTION TOPICAL at 06:33

## 2020-04-07 RX ADMIN — Medication 25 MILLIGRAM(S): at 18:15

## 2020-04-07 RX ADMIN — MIDODRINE HYDROCHLORIDE 10 MILLIGRAM(S): 2.5 TABLET ORAL at 08:23

## 2020-04-07 RX ADMIN — MIDODRINE HYDROCHLORIDE 10 MILLIGRAM(S): 2.5 TABLET ORAL at 01:30

## 2020-04-07 RX ADMIN — Medication 40 MILLIGRAM(S): at 06:31

## 2020-04-07 RX ADMIN — MIDODRINE HYDROCHLORIDE 5 MILLIGRAM(S): 2.5 TABLET ORAL at 18:15

## 2020-04-07 RX ADMIN — ENOXAPARIN SODIUM 80 MILLIGRAM(S): 100 INJECTION SUBCUTANEOUS at 18:15

## 2020-04-07 RX ADMIN — ENOXAPARIN SODIUM 80 MILLIGRAM(S): 100 INJECTION SUBCUTANEOUS at 06:31

## 2020-04-07 NOTE — PROGRESS NOTE ADULT - SUBJECTIVE AND OBJECTIVE BOX
Patient with no acute events overnight, still noted to desat on NRB when not proned.    T(C): 36.2 (04-07-20 @ 08:35), Max: 37.1 (04-07-20 @ 06:22)  HR: 56 (04-07-20 @ 08:35) (50 - 64)  BP: 99/57 (04-07-20 @ 08:35) (99/57 - 121/68)  RR: 25 (04-07-20 @ 08:35) (22 - 222)  SpO2: 87% (04-07-20 @ 08:35) (84% - 93%)  Wt(kg): --Vital Signs Last 24 Hrs    Review of Systems:  -All other ROS negative, except those noted in HPI    PHYSICAL EXAM:  Not examined in order to dec exposure as patient has been stable.    acetaminophen    Suspension .. 650 milliGRAM(s) Enteral Tube every 6 hours PRN  chlorhexidine 2% Cloths 1 Application(s) Topical <User Schedule>  dextrose 40% Gel 15 Gram(s) Oral once PRN  dextrose 5%. 1000 milliLiter(s) IV Continuous <Continuous>  dextrose 50% Injectable 12.5 Gram(s) IV Push once  dextrose 50% Injectable 25 Gram(s) IV Push once  dextrose 50% Injectable 25 Gram(s) IV Push once  enoxaparin Injectable 80 milliGRAM(s) SubCutaneous every 12 hours  glucagon  Injectable 1 milliGRAM(s) IntraMuscular once PRN  insulin lispro (HumaLOG) corrective regimen sliding scale   SubCutaneous Before meals and at bedtime  metoprolol tartrate 25 milliGRAM(s) Oral every 12 hours  midodrine 10 milliGRAM(s) Oral every 8 hours      LABS:                        15.4   10.03 )-----------( 223      ( 07 Apr 2020 09:57 )             47.5     04-06    143  |  103  |  31<H>  ----------------------------<  112<H>  4.2   |  26  |  0.88    Ca    8.6      06 Apr 2020 07:36  Phos  3.1     04-06  Mg     2.1     04-06    TPro  6.6  /  Alb  3.0<L>  /  TBili  0.4  /  DBili  x   /  AST  136<H>  /  ALT  126<H>  /  AlkPhos  56  04-06    PT/INR - ( 05 Apr 2020 16:03 )   PT: 15.1 sec;   INR: 1.31              CAPILLARY BLOOD GLUCOSE      POCT Blood Glucose.: 82 mg/dL (07 Apr 2020 06:50)  POCT Blood Glucose.: 142 mg/dL (06 Apr 2020 21:33)  POCT Blood Glucose.: 118 mg/dL (06 Apr 2020 16:28)  POCT Blood Glucose.: 138 mg/dL (06 Apr 2020 11:38)

## 2020-04-07 NOTE — PROGRESS NOTE ADULT - PROBLEM SELECTOR PLAN 1
Acute hypoxic RF 2/2 COVID-19 infection. Patient intubated on arrival. Completed azithromycin and hydroxychloroquine outpatient.   - s/p tociluzumab 4/2  - c/w methylprednisolone 40mg IV x5 days (4/2-4/6)   - successfully extubated to NRB (4/3)-->needs to be proned and sats improve to mid 90s. Patient is still low 80s when on his back  - diurese as needed

## 2020-04-07 NOTE — PROGRESS NOTE ADULT - PROBLEM SELECTOR PLAN 2
Presented with sepsis 2/2 to COVID. confirmed positive for COVID-19. CXR with bilateral infiltrates representing COVID viral pneumonia.  Now with downtrending leukocytosis and downtrending inflammatory biomarkers (D-dimer, CRP). Stable procalcitonin.   - s/p COVID bundle: azithromycin, hydroxychloroquine outpatient  - s/p tociluzumab (4/2)   - c/w methylprednisolone 40mg IV BID (4/2-4/6)   - Blood cx  NGTD   - Tylenol PRN for fever  - Robitussin PRN for cough  - Avoid NSAIDs, ACE/ARB  - no nebulizers, MDI only  - COVID Isolation precautions: contact and droplet   - continue to monitor daily CBC w/ differential, CMP, Mg, Phos, CRP, procalcitonin, and ABG  - q3d labs: ESR, Tcell subset, d-dimer, LDH, ferritin, CK, trop, coags

## 2020-04-08 LAB
ALBUMIN SERPL ELPH-MCNC: 3.1 G/DL — LOW (ref 3.3–5)
ALP SERPL-CCNC: 73 U/L — SIGNIFICANT CHANGE UP (ref 40–120)
ALT FLD-CCNC: 137 U/L — HIGH (ref 10–45)
ANION GAP SERPL CALC-SCNC: 14 MMOL/L — SIGNIFICANT CHANGE UP (ref 5–17)
AST SERPL-CCNC: 77 U/L — HIGH (ref 10–40)
BASOPHILS # BLD AUTO: 0.02 K/UL — SIGNIFICANT CHANGE UP (ref 0–0.2)
BASOPHILS NFR BLD AUTO: 0.2 % — SIGNIFICANT CHANGE UP (ref 0–2)
BILIRUB SERPL-MCNC: 0.7 MG/DL — SIGNIFICANT CHANGE UP (ref 0.2–1.2)
BUN SERPL-MCNC: 33 MG/DL — HIGH (ref 7–23)
CALCIUM SERPL-MCNC: 9.1 MG/DL — SIGNIFICANT CHANGE UP (ref 8.4–10.5)
CHLORIDE SERPL-SCNC: 101 MMOL/L — SIGNIFICANT CHANGE UP (ref 96–108)
CO2 SERPL-SCNC: 25 MMOL/L — SIGNIFICANT CHANGE UP (ref 22–31)
CREAT SERPL-MCNC: 0.79 MG/DL — SIGNIFICANT CHANGE UP (ref 0.5–1.3)
CRP SERPL-MCNC: 1.41 MG/DL — HIGH (ref 0–0.4)
D DIMER BLD IA.RAPID-MCNC: 523 NG/ML DDU — HIGH
EOSINOPHIL # BLD AUTO: 0.01 K/UL — SIGNIFICANT CHANGE UP (ref 0–0.5)
EOSINOPHIL NFR BLD AUTO: 0.1 % — SIGNIFICANT CHANGE UP (ref 0–6)
FERRITIN SERPL-MCNC: 697 NG/ML — HIGH (ref 30–400)
GLUCOSE SERPL-MCNC: 122 MG/DL — HIGH (ref 70–99)
HCT VFR BLD CALC: 48.5 % — SIGNIFICANT CHANGE UP (ref 39–50)
HGB BLD-MCNC: 15.9 G/DL — SIGNIFICANT CHANGE UP (ref 13–17)
IMM GRANULOCYTES NFR BLD AUTO: 1.7 % — HIGH (ref 0–1.5)
LYMPHOCYTES # BLD AUTO: 0.39 K/UL — LOW (ref 1–3.3)
LYMPHOCYTES # BLD AUTO: 3.9 % — LOW (ref 13–44)
MAGNESIUM SERPL-MCNC: 2 MG/DL — SIGNIFICANT CHANGE UP (ref 1.6–2.6)
MCHC RBC-ENTMCNC: 28.5 PG — SIGNIFICANT CHANGE UP (ref 27–34)
MCHC RBC-ENTMCNC: 32.8 GM/DL — SIGNIFICANT CHANGE UP (ref 32–36)
MCV RBC AUTO: 87.1 FL — SIGNIFICANT CHANGE UP (ref 80–100)
MONOCYTES # BLD AUTO: 0.35 K/UL — SIGNIFICANT CHANGE UP (ref 0–0.9)
MONOCYTES NFR BLD AUTO: 3.5 % — SIGNIFICANT CHANGE UP (ref 2–14)
NEUTROPHILS # BLD AUTO: 8.96 K/UL — HIGH (ref 1.8–7.4)
NEUTROPHILS NFR BLD AUTO: 90.6 % — HIGH (ref 43–77)
NRBC # BLD: 0 /100 WBCS — SIGNIFICANT CHANGE UP (ref 0–0)
PHOSPHATE SERPL-MCNC: 3.7 MG/DL — SIGNIFICANT CHANGE UP (ref 2.5–4.5)
PLATELET # BLD AUTO: 228 K/UL — SIGNIFICANT CHANGE UP (ref 150–400)
POTASSIUM SERPL-MCNC: 4.7 MMOL/L — SIGNIFICANT CHANGE UP (ref 3.5–5.3)
POTASSIUM SERPL-SCNC: 4.7 MMOL/L — SIGNIFICANT CHANGE UP (ref 3.5–5.3)
PROT SERPL-MCNC: 7.2 G/DL — SIGNIFICANT CHANGE UP (ref 6–8.3)
RBC # BLD: 5.57 M/UL — SIGNIFICANT CHANGE UP (ref 4.2–5.8)
RBC # FLD: 14.3 % — SIGNIFICANT CHANGE UP (ref 10.3–14.5)
SODIUM SERPL-SCNC: 140 MMOL/L — SIGNIFICANT CHANGE UP (ref 135–145)
WBC # BLD: 9.9 K/UL — SIGNIFICANT CHANGE UP (ref 3.8–10.5)
WBC # FLD AUTO: 9.9 K/UL — SIGNIFICANT CHANGE UP (ref 3.8–10.5)

## 2020-04-08 RX ORDER — FUROSEMIDE 40 MG
40 TABLET ORAL ONCE
Refills: 0 | Status: COMPLETED | OUTPATIENT
Start: 2020-04-08 | End: 2020-04-08

## 2020-04-08 RX ADMIN — MIDODRINE HYDROCHLORIDE 5 MILLIGRAM(S): 2.5 TABLET ORAL at 13:28

## 2020-04-08 RX ADMIN — CHLORHEXIDINE GLUCONATE 1 APPLICATION(S): 213 SOLUTION TOPICAL at 06:03

## 2020-04-08 RX ADMIN — Medication 20 MILLIGRAM(S): at 13:27

## 2020-04-08 RX ADMIN — Medication 40 MILLIGRAM(S): at 00:23

## 2020-04-08 RX ADMIN — MIDODRINE HYDROCHLORIDE 5 MILLIGRAM(S): 2.5 TABLET ORAL at 23:44

## 2020-04-08 RX ADMIN — ENOXAPARIN SODIUM 80 MILLIGRAM(S): 100 INJECTION SUBCUTANEOUS at 17:59

## 2020-04-08 RX ADMIN — MIDODRINE HYDROCHLORIDE 5 MILLIGRAM(S): 2.5 TABLET ORAL at 02:08

## 2020-04-08 RX ADMIN — Medication 20 MILLIGRAM(S): at 00:23

## 2020-04-08 RX ADMIN — Medication 20 MILLIGRAM(S): at 23:59

## 2020-04-08 RX ADMIN — ENOXAPARIN SODIUM 80 MILLIGRAM(S): 100 INJECTION SUBCUTANEOUS at 06:02

## 2020-04-08 NOTE — PROGRESS NOTE ADULT - SUBJECTIVE AND OBJECTIVE BOX
Patient given steroids and 20 mg lasix IVP due to concerns over low 02 sats in the 80s. Patient currently stable with no other events since.    T(C): 35.9 (04-08-20 @ 09:05), Max: 36.8 (04-07-20 @ 20:27)  HR: 64 (04-08-20 @ 09:05) (54 - 64)  BP: 111/60 (04-08-20 @ 09:05) (103/57 - 131/61)  RR: 22 (04-08-20 @ 09:05) (22 - 25)  SpO2: 89% (04-08-20 @ 09:05) (82% - 89%)  Wt(kg): --Vital Signs Last 24 Hrs    Review of Systems:  -All other ROS negative, except those noted in HPI    PHYSICAL EXAM:  Examined by attending.     acetaminophen    Suspension .. 650 milliGRAM(s) Enteral Tube every 6 hours PRN  chlorhexidine 2% Cloths 1 Application(s) Topical <User Schedule>  dextrose 40% Gel 15 Gram(s) Oral once PRN  dextrose 5%. 1000 milliLiter(s) IV Continuous <Continuous>  dextrose 50% Injectable 12.5 Gram(s) IV Push once  dextrose 50% Injectable 25 Gram(s) IV Push once  dextrose 50% Injectable 25 Gram(s) IV Push once  enoxaparin Injectable 80 milliGRAM(s) SubCutaneous every 12 hours  glucagon  Injectable 1 milliGRAM(s) IntraMuscular once PRN  insulin lispro (HumaLOG) corrective regimen sliding scale   SubCutaneous Before meals and at bedtime  methylPREDNISolone sodium succinate Injectable 20 milliGRAM(s) IV Push two times a day  metoprolol tartrate 25 milliGRAM(s) Oral every 12 hours  midodrine 5 milliGRAM(s) Oral every 8 hours      LABS:                        15.9   9.90  )-----------( 228      ( 08 Apr 2020 07:13 )             48.5     04-08    140  |  101  |  33<H>  ----------------------------<  122<H>  4.7   |  25  |  0.79    Ca    9.1      08 Apr 2020 07:13  Phos  3.7     04-08  Mg     2.0     04-08    TPro  7.2  /  Alb  3.1<L>  /  TBili  0.7  /  DBili  x   /  AST  77<H>  /  ALT  137<H>  /  AlkPhos  73  04-08        CAPILLARY BLOOD GLUCOSE      POCT Blood Glucose.: 105 mg/dL (08 Apr 2020 08:34)  POCT Blood Glucose.: 125 mg/dL (07 Apr 2020 21:16)  POCT Blood Glucose.: 111 mg/dL (07 Apr 2020 17:04)  POCT Blood Glucose.: 154 mg/dL (07 Apr 2020 12:25)  POCT Blood Glucose.: 129 mg/dL (07 Apr 2020 11:20)

## 2020-04-08 NOTE — CHART NOTE - NSCHARTNOTEFT_GEN_A_CORE
Admitting Diagnosis:   Patient is a 80y old  Male who presents with a chief complaint of COVID (08 Apr 2020 11:16)      PAST MEDICAL & SURGICAL HISTORY:  BPH (benign prostatic hyperplasia)  HLD (hyperlipidemia)  BPH (benign prostatic hyperplasia)  Hypertension  No significant past surgical history      Current Nutrition Order: regular   PO Intake: Good (%) [   ]  Fair (50-75%) [   ] Poor (<25%) [  X ]  GI Issues: +BM 4/5   Pain: none per flow sheets   Skin Integrity: no edema, Stage I pressure ulcer to sacrum     Labs:   04-08    140  |  101  |  33<H>  ----------------------------<  122<H>  4.7   |  25  |  0.79    Ca    9.1      08 Apr 2020 07:13  Phos  3.7     04-08  Mg     2.0     04-08    TPro  7.2  /  Alb  3.1<L>  /  TBili  0.7  /  DBili  x   /  AST  77<H>  /  ALT  137<H>  /  AlkPhos  73  04-08    CAPILLARY BLOOD GLUCOSE      POCT Blood Glucose.: 105 mg/dL (08 Apr 2020 08:34)  POCT Blood Glucose.: 125 mg/dL (07 Apr 2020 21:16)  POCT Blood Glucose.: 111 mg/dL (07 Apr 2020 17:04)      Medications:  MEDICATIONS  (STANDING):  chlorhexidine 2% Cloths 1 Application(s) Topical <User Schedule>  dextrose 5%. 1000 milliLiter(s) (50 mL/Hr) IV Continuous <Continuous>  dextrose 50% Injectable 12.5 Gram(s) IV Push once  dextrose 50% Injectable 25 Gram(s) IV Push once  dextrose 50% Injectable 25 Gram(s) IV Push once  enoxaparin Injectable 80 milliGRAM(s) SubCutaneous every 12 hours  insulin lispro (HumaLOG) corrective regimen sliding scale   SubCutaneous Before meals and at bedtime  methylPREDNISolone sodium succinate Injectable 20 milliGRAM(s) IV Push two times a day  metoprolol tartrate 25 milliGRAM(s) Oral every 12 hours  midodrine 5 milliGRAM(s) Oral every 8 hours    MEDICATIONS  (PRN):  acetaminophen    Suspension .. 650 milliGRAM(s) Enteral Tube every 6 hours PRN Temp greater or equal to 38C (100.4F), Moderate Pain (4 - 6)  dextrose 40% Gel 15 Gram(s) Oral once PRN Blood Glucose LESS THAN 70 milliGRAM(s)/deciliter  glucagon  Injectable 1 milliGRAM(s) IntraMuscular once PRN Glucose LESS THAN 70 milligrams/deciliter      6'2''  pounds +-10%  4/2 195 pounds  BMI 25.1 %ZJI=605%   Based on most recent EMR wt     Nutrition Focused Physical Exam: Completed [   ]  Not Pertinent [   ]- NA COVID    Estimated energy needs:   Current body wt used for energy calculations as pt falls within % IBW   Needs adjusted for age based on hypermetabolic state 2/2 viral infection  2225kcal/day (25kcal/day)  107g protein/day (1.2g pro/kg)  Fluids per team     79 yo M h/o BPH, HLD first had fever on 3/31 started plaquenil and azithro then had 02 sat in low 80s and came to ED then intubated d/t Acute respiratory failure with hypoxia/COPD. Found to have new Afib with RVR. Pt extubated 4/5, s/p extubation 4/3 to NRB and stable for stepdown to COVID-telemetry, currently on 5UR. Need for proning noted.   Pt passed bedside dys screen 4/3 4/5, current diet order for regular at this time.  Unable to conduct a face to face interview or nutrition-focused physical exam due to limited contact restrictions related to their medical condition and isolation precautions - Spoke with RN. RN reports pt with very limited PO intake d/t being unable to come off NRB for long enough for meals.   Please see below for full nutritional recommendations- d/w team. Pending order placed. RD to monitor and f/u per protocol.    Prior PES; Inadequate Energy Intake Etiology RT inability to meet EER on NPO status Signs/Symptoms AEB pt meeting 0%EER.   RESOLVED - pt no longer NPO  New PES: Inadequate Oral Intake RT decreased ability for meals d/t current medical conditions AEB poor PO   Goal/Expected Outcome Meet >75%EER via most appropriate route with good tolerance within 24-48h.    Recommendations:  1. Monitor need for NPO should PO diet not be safe/feasible with use of NRB.  >> Should PO diet be feasible, recommend use of mech soft to provide ease at meals / use of oral nutrition supplements to increase chances of meeting EER - consider Ensure enlive x3/day.  2. Should PO diet not be feasible / should pt be unable to meet EER via PO alone d/t insuffiencey time for meals 2/2 resp therapy, as medically feasible Pending resp therapy consider use of TF.   >> Should PO intake remain diminished pt to benefit from supplemental TF given Hypermetabolic state. Please reconsult for Recs PRN.   3. MVI daily, However d/t COVID - Micro-nutrients/Vitamins/Minerals per team.   4. Monitor Skin, Labs, Wts, GOC.  5. RD to remain available for additional nutrition interventions as needed.     Education: NA  Risk Level: High [ X  ] Moderate [   ] Low [   ] Admitting Diagnosis:   Patient is a 80y old  Male who presents with a chief complaint of COVID (08 Apr 2020 11:16)      PAST MEDICAL & SURGICAL HISTORY:  BPH (benign prostatic hyperplasia)  HLD (hyperlipidemia)  BPH (benign prostatic hyperplasia)  Hypertension  No significant past surgical history      Current Nutrition Order: regular   PO Intake: Good (%) [   ]  Fair (50-75%) [   ] Poor (<25%) [  X ]  GI Issues: +BM 4/5   Pain: none per flow sheets   Skin Integrity: no edema, Stage I pressure ulcer to sacrum     Labs:   04-08    140  |  101  |  33<H>  ----------------------------<  122<H>  4.7   |  25  |  0.79    Ca    9.1      08 Apr 2020 07:13  Phos  3.7     04-08  Mg     2.0     04-08    TPro  7.2  /  Alb  3.1<L>  /  TBili  0.7  /  DBili  x   /  AST  77<H>  /  ALT  137<H>  /  AlkPhos  73  04-08    CAPILLARY BLOOD GLUCOSE      POCT Blood Glucose.: 105 mg/dL (08 Apr 2020 08:34)  POCT Blood Glucose.: 125 mg/dL (07 Apr 2020 21:16)  POCT Blood Glucose.: 111 mg/dL (07 Apr 2020 17:04)      Medications:  MEDICATIONS  (STANDING):  chlorhexidine 2% Cloths 1 Application(s) Topical <User Schedule>  dextrose 5%. 1000 milliLiter(s) (50 mL/Hr) IV Continuous <Continuous>  dextrose 50% Injectable 12.5 Gram(s) IV Push once  dextrose 50% Injectable 25 Gram(s) IV Push once  dextrose 50% Injectable 25 Gram(s) IV Push once  enoxaparin Injectable 80 milliGRAM(s) SubCutaneous every 12 hours  insulin lispro (HumaLOG) corrective regimen sliding scale   SubCutaneous Before meals and at bedtime  methylPREDNISolone sodium succinate Injectable 20 milliGRAM(s) IV Push two times a day  metoprolol tartrate 25 milliGRAM(s) Oral every 12 hours  midodrine 5 milliGRAM(s) Oral every 8 hours    MEDICATIONS  (PRN):  acetaminophen    Suspension .. 650 milliGRAM(s) Enteral Tube every 6 hours PRN Temp greater or equal to 38C (100.4F), Moderate Pain (4 - 6)  dextrose 40% Gel 15 Gram(s) Oral once PRN Blood Glucose LESS THAN 70 milliGRAM(s)/deciliter  glucagon  Injectable 1 milliGRAM(s) IntraMuscular once PRN Glucose LESS THAN 70 milligrams/deciliter      6'2''  pounds +-10%  4/2 195 pounds  BMI 25.1 %EOY=220%   Based on most recent EMR wt     Nutrition Focused Physical Exam: Completed [   ]  Not Pertinent [   ]- NA COVID    Estimated energy needs:   Current body wt used for energy calculations as pt falls within % IBW   Needs adjusted for age based on hypermetabolic state 2/2 viral infection  2225kcal/day (25kcal/day)  107g protein/day (1.2g pro/kg)  Fluids per team     81 yo M h/o BPH, HLD first had fever on 3/31 started plaquenil and azithro then had 02 sat in low 80s and came to ED then intubated d/t Acute respiratory failure with hypoxia/COPD. Found to have new Afib with RVR. Pt extubated 4/5, s/p extubation 4/3 to NRB and stable for stepdown to COVID-telemetry, currently on 5UR. Need for proning noted.   Pt passed bedside dys screen 4/3 4/5, current diet order for regular at this time.  Unable to conduct a face to face interview or nutrition-focused physical exam due to limited contact restrictions related to their medical condition and isolation precautions - Spoke with RN. RN reports pt with very limited PO intake d/t being unable to come off NRB for long enough for meals.   Please see below for full nutritional recommendations- d/w team and Pending order placed. RD to monitor and f/u per protocol.    Prior PES: Inadequate Energy Intake Etiology RT inability to meet EER on NPO status Signs/Symptoms AEB pt meeting 0%EER.   RESOLVED - pt no longer NPO  New PES: Inadequate Oral Intake RT decreased ability for meals d/t current medical conditions AEB poor PO   Goal/Expected Outcome Meet >75%EER via most appropriate route with good tolerance within 24-48h.    Recommendations:  1. Monitor need for NPO should PO diet not be safe/feasible with use of NRB.  >> Should PO diet be feasible, recommend use of mech soft to provide ease at meals / use of oral nutrition supplements to increase chances of meeting EER - consider Glucerna enlive x3/day (preDM A1c noted 6.3%).  2. Should PO diet not be feasible / Should pt be unable to meet EER via PO alone d/t insuffiencey time for meals 2/2 resp therapy, As medically feasible Pending resp therapy consider use of TF.   >> Should PO intake remain diminished pt to benefit from supplemental TF given Hypermetabolic state. Please reconsult for Recs PRN.   3. MVI daily, However d/t COVID - Micro-nutrients/Vitamins/Minerals per team.   4. Monitor Skin, Labs, Wts, GOC.  5. RD to remain available for additional nutrition interventions as needed.     Education: NA  Risk Level: High [ X  ] Moderate [   ] Low [   ]

## 2020-04-09 LAB
ALBUMIN SERPL ELPH-MCNC: 3.3 G/DL — SIGNIFICANT CHANGE UP (ref 3.3–5)
ALP SERPL-CCNC: 68 U/L — SIGNIFICANT CHANGE UP (ref 40–120)
ALT FLD-CCNC: 104 U/L — HIGH (ref 10–45)
ANION GAP SERPL CALC-SCNC: 15 MMOL/L — SIGNIFICANT CHANGE UP (ref 5–17)
AST SERPL-CCNC: 51 U/L — HIGH (ref 10–40)
BILIRUB SERPL-MCNC: 0.8 MG/DL — SIGNIFICANT CHANGE UP (ref 0.2–1.2)
BUN SERPL-MCNC: 34 MG/DL — HIGH (ref 7–23)
CALCIUM SERPL-MCNC: 8.8 MG/DL — SIGNIFICANT CHANGE UP (ref 8.4–10.5)
CHLORIDE SERPL-SCNC: 100 MMOL/L — SIGNIFICANT CHANGE UP (ref 96–108)
CO2 SERPL-SCNC: 24 MMOL/L — SIGNIFICANT CHANGE UP (ref 22–31)
CREAT SERPL-MCNC: 0.72 MG/DL — SIGNIFICANT CHANGE UP (ref 0.5–1.3)
GLUCOSE SERPL-MCNC: 119 MG/DL — HIGH (ref 70–99)
HCT VFR BLD CALC: 47.8 % — SIGNIFICANT CHANGE UP (ref 39–50)
HGB BLD-MCNC: 15.7 G/DL — SIGNIFICANT CHANGE UP (ref 13–17)
MAGNESIUM SERPL-MCNC: 2.1 MG/DL — SIGNIFICANT CHANGE UP (ref 1.6–2.6)
MCHC RBC-ENTMCNC: 28.6 PG — SIGNIFICANT CHANGE UP (ref 27–34)
MCHC RBC-ENTMCNC: 32.8 GM/DL — SIGNIFICANT CHANGE UP (ref 32–36)
MCV RBC AUTO: 87.2 FL — SIGNIFICANT CHANGE UP (ref 80–100)
NRBC # BLD: 0 /100 WBCS — SIGNIFICANT CHANGE UP (ref 0–0)
PHOSPHATE SERPL-MCNC: 3.3 MG/DL — SIGNIFICANT CHANGE UP (ref 2.5–4.5)
PLATELET # BLD AUTO: 222 K/UL — SIGNIFICANT CHANGE UP (ref 150–400)
POTASSIUM SERPL-MCNC: 4.6 MMOL/L — SIGNIFICANT CHANGE UP (ref 3.5–5.3)
POTASSIUM SERPL-SCNC: 4.6 MMOL/L — SIGNIFICANT CHANGE UP (ref 3.5–5.3)
PROT SERPL-MCNC: 6.9 G/DL — SIGNIFICANT CHANGE UP (ref 6–8.3)
RBC # BLD: 5.48 M/UL — SIGNIFICANT CHANGE UP (ref 4.2–5.8)
RBC # FLD: 14.2 % — SIGNIFICANT CHANGE UP (ref 10.3–14.5)
SODIUM SERPL-SCNC: 139 MMOL/L — SIGNIFICANT CHANGE UP (ref 135–145)
WBC # BLD: 8.89 K/UL — SIGNIFICANT CHANGE UP (ref 3.8–10.5)
WBC # FLD AUTO: 8.89 K/UL — SIGNIFICANT CHANGE UP (ref 3.8–10.5)

## 2020-04-09 RX ORDER — METOPROLOL TARTRATE 50 MG
25 TABLET ORAL EVERY 12 HOURS
Refills: 0 | Status: DISCONTINUED | OUTPATIENT
Start: 2020-04-10 | End: 2020-04-12

## 2020-04-09 RX ORDER — FUROSEMIDE 40 MG
20 TABLET ORAL ONCE
Refills: 0 | Status: COMPLETED | OUTPATIENT
Start: 2020-04-09 | End: 2020-04-09

## 2020-04-09 RX ADMIN — Medication 2: at 18:00

## 2020-04-09 RX ADMIN — Medication 25 MILLIGRAM(S): at 17:30

## 2020-04-09 RX ADMIN — CHLORHEXIDINE GLUCONATE 1 APPLICATION(S): 213 SOLUTION TOPICAL at 05:50

## 2020-04-09 RX ADMIN — Medication 10 MILLIGRAM(S): at 23:00

## 2020-04-09 RX ADMIN — MIDODRINE HYDROCHLORIDE 5 MILLIGRAM(S): 2.5 TABLET ORAL at 06:32

## 2020-04-09 RX ADMIN — Medication 25 MILLIGRAM(S): at 05:51

## 2020-04-09 RX ADMIN — MIDODRINE HYDROCHLORIDE 5 MILLIGRAM(S): 2.5 TABLET ORAL at 23:00

## 2020-04-09 RX ADMIN — ENOXAPARIN SODIUM 80 MILLIGRAM(S): 100 INJECTION SUBCUTANEOUS at 17:30

## 2020-04-09 RX ADMIN — Medication 10 MILLIGRAM(S): at 11:43

## 2020-04-09 RX ADMIN — MIDODRINE HYDROCHLORIDE 5 MILLIGRAM(S): 2.5 TABLET ORAL at 15:55

## 2020-04-09 RX ADMIN — ENOXAPARIN SODIUM 80 MILLIGRAM(S): 100 INJECTION SUBCUTANEOUS at 05:51

## 2020-04-09 RX ADMIN — Medication 20 MILLIGRAM(S): at 11:42

## 2020-04-09 NOTE — PROGRESS NOTE ADULT - PROBLEM SELECTOR PLAN 2
Presented with sepsis 2/2 to COVID. confirmed positive for COVID-19. CXR with bilateral infiltrates representing COVID viral pneumonia.  Now with downtrending leukocytosis and downtrending inflammatory biomarkers (D-dimer, CRP). Stable procalcitonin.   - s/p COVID bundle: azithromycin, hydroxychloroquine outpatient - s/p tociluzumab 4/2  - c/w methylprednisolone 40mg IV BID (4/2-4/6)   - Blood cx  NGTD   - Tylenol PRN for fever  - Robitussin PRN for cough  - Avoid NSAIDs, ACE/ARB  - no nebulizers, MDI only  - COVID Isolation precautions: contact and droplet   - continue to monitor daily CBC w/ differential, CMP, Mg, Phos, CRP, procalcitonin, and ABG  - q3d labs: ESR, Tcell subset, d-dimer, LDH, ferritin, CK, trop, coags Presented with sepsis 2/2 to COVID. confirmed positive for COVID-19. CXR with bilateral infiltrates representing COVID viral pneumonia.  Now with downtrending leukocytosis and downtrending inflammatory biomarkers (D-dimer, CRP). Stable procalcitonin.   - s/p COVID bundle: azithromycin, hydroxychloroquine outpatient - s/p tociluzumab 4/2  - c/w methylprednisolone 40mg IV BID (Start 4/2)- inflam markers downtrendign but still with elevated O2.    - Blood cx  NGTD   - Tylenol PRN for fever  - Robitussin PRN for cough  - Avoid NSAIDs, ACE/ARB  - no nebulizers, MDI only  - COVID Isolation precautions: contact and droplet   - continue to monitor daily CBC w/ differential, CMP, Mg, Phos, CRP, procalcitonin, and ABG  - q3d labs: ESR, Tcell subset, d-dimer, LDH, ferritin, CK, trop, coags Presented with sepsis 2/2 to COVID. confirmed positive for COVID-19. CXR with bilateral infiltrates representing COVID viral pneumonia.  Now with downtrending leukocytosis and downtrending inflammatory biomarkers (D-dimer, CRP). Stable procalcitonin.   - s/p COVID bundle: azithromycin, hydroxychloroquine outpatient - s/p tociluzumab 4/2  - c/w methylprednisolone 40mg IV BID (Start 4/2-4/9)- taper to d/c 4/10  - Blood cx  NGTD   - Tylenol PRN for fever  - Robitussin PRN for cough  - Avoid NSAIDs, ACE/ARB  - no nebulizers, MDI only  - COVID Isolation precautions: contact and droplet   - continue to monitor daily CBC w/ differential, CMP, Mg, Phos, CRP, procalcitonin, and ABG  - q3d labs: ESR, Tcell subset, d-dimer, LDH, ferritin, CK, trop, coags

## 2020-04-09 NOTE — PROGRESS NOTE ADULT - SUBJECTIVE AND OBJECTIVE BOX
OVERNIGHT EVENTS: No acute events overnight reported by patient or staff    SUBJECTIVE / INTERVAL HPI: Patient seen and examined at bedside. Tolerating NRB      .  VITAL SIGNS:  T(C): 36.4 (04-09-20 @ 05:54), Max: 37.2 (04-08-20 @ 21:07)  T(F): 97.6 (04-09-20 @ 05:54), Max: 99 (04-09-20 @ 00:11)  HR: 60 (04-09-20 @ 05:54) (57 - 62)  BP: 114/56 (04-09-20 @ 05:54) (90/51 - 114/56)  BP(mean): --  RR: 22 (04-09-20 @ 05:54) (22 - 22)  SpO2: 91% (04-09-20 @ 05:54) (89% - 91%)  Wt(kg): --    PHYSICAL EXAM:    Performed by attending      04-08-20 @ 07:01  -  04-09-20 @ 07:00  --------------------------------------------------------  IN: 0 mL / OUT: 400 mL / NET: -400 mL        MEDICATIONS:  MEDICATIONS  (STANDING):  chlorhexidine 2% Cloths 1 Application(s) Topical <User Schedule>  dextrose 5%. 1000 milliLiter(s) (50 mL/Hr) IV Continuous <Continuous>  dextrose 50% Injectable 12.5 Gram(s) IV Push once  dextrose 50% Injectable 25 Gram(s) IV Push once  dextrose 50% Injectable 25 Gram(s) IV Push once  enoxaparin Injectable 80 milliGRAM(s) SubCutaneous every 12 hours  furosemide   Injectable 20 milliGRAM(s) IV Push once  insulin lispro (HumaLOG) corrective regimen sliding scale   SubCutaneous Before meals and at bedtime  methylPREDNISolone sodium succinate Injectable 20 milliGRAM(s) IV Push two times a day  metoprolol tartrate 25 milliGRAM(s) Oral every 12 hours  midodrine 5 milliGRAM(s) Oral every 8 hours    MEDICATIONS  (PRN):  acetaminophen    Suspension .. 650 milliGRAM(s) Enteral Tube every 6 hours PRN Temp greater or equal to 38C (100.4F), Moderate Pain (4 - 6)  dextrose 40% Gel 15 Gram(s) Oral once PRN Blood Glucose LESS THAN 70 milliGRAM(s)/deciliter  glucagon  Injectable 1 milliGRAM(s) IntraMuscular once PRN Glucose LESS THAN 70 milligrams/deciliter      ALLERGIES:  Allergies    No Known Allergies    Intolerances        Pertinent LABS, RADIOLOGY, MICROBIOLOGY, and ADDITIONAL TESTS reviewed:   .  LABS:                         15.7   8.89  )-----------( 222      ( 09 Apr 2020 07:48 )             47.8     04-09    139  |  100  |  34<H>  ----------------------------<  119<H>  4.6   |  24  |  0.72    Ca    8.8      09 Apr 2020 07:48  Phos  3.3     04-09  Mg     2.1     04-09    TPro  6.9  /  Alb  3.3  /  TBili  0.8  /  DBili  x   /  AST  51<H>  /  ALT  104<H>  /  AlkPhos  68  04-09                  RADIOLOGY, EKG & ADDITIONAL TESTS:   No New Imaging OVERNIGHT EVENTS: No acute events overnight reported by patient or staff    SUBJECTIVE / INTERVAL HPI: Patient seen and examined at bedside. Tolerating NRB. Patient denies any ROS.    VITAL SIGNS:  T(C): 36.4 (04-09-20 @ 05:54), Max: 37.2 (04-08-20 @ 21:07)  T(F): 97.6 (04-09-20 @ 05:54), Max: 99 (04-09-20 @ 00:11)  HR: 60 (04-09-20 @ 05:54) (57 - 62)  BP: 114/56 (04-09-20 @ 05:54) (90/51 - 114/56)  BP(mean): --  RR: 22 (04-09-20 @ 05:54) (22 - 22)  SpO2: 91% (04-09-20 @ 05:54) (89% - 91%)  Wt(kg): --    PHYSICAL EXAM:  General: NAD, sitting in bed on IPAD  Heart: RRR, no murmurs  Lungs: bibasilar crackles, on NRB,  Abdomen: soft nondistended, no rebound or guarding  LE: no edema, good pulses        04-08-20 @ 07:01  -  04-09-20 @ 07:00  --------------------------------------------------------  IN: 0 mL / OUT: 400 mL / NET: -400 mL        MEDICATIONS:  MEDICATIONS  (STANDING):  chlorhexidine 2% Cloths 1 Application(s) Topical <User Schedule>  dextrose 5%. 1000 milliLiter(s) (50 mL/Hr) IV Continuous <Continuous>  dextrose 50% Injectable 12.5 Gram(s) IV Push once  dextrose 50% Injectable 25 Gram(s) IV Push once  dextrose 50% Injectable 25 Gram(s) IV Push once  enoxaparin Injectable 80 milliGRAM(s) SubCutaneous every 12 hours  furosemide   Injectable 20 milliGRAM(s) IV Push once  insulin lispro (HumaLOG) corrective regimen sliding scale   SubCutaneous Before meals and at bedtime  methylPREDNISolone sodium succinate Injectable 20 milliGRAM(s) IV Push two times a day  metoprolol tartrate 25 milliGRAM(s) Oral every 12 hours  midodrine 5 milliGRAM(s) Oral every 8 hours    MEDICATIONS  (PRN):  acetaminophen    Suspension .. 650 milliGRAM(s) Enteral Tube every 6 hours PRN Temp greater or equal to 38C (100.4F), Moderate Pain (4 - 6)  dextrose 40% Gel 15 Gram(s) Oral once PRN Blood Glucose LESS THAN 70 milliGRAM(s)/deciliter  glucagon  Injectable 1 milliGRAM(s) IntraMuscular once PRN Glucose LESS THAN 70 milligrams/deciliter      ALLERGIES:  Allergies    No Known Allergies    Intolerances        Pertinent LABS, RADIOLOGY, MICROBIOLOGY, and ADDITIONAL TESTS reviewed:   .  LABS:                         15.7   8.89  )-----------( 222      ( 09 Apr 2020 07:48 )             47.8     04-09    139  |  100  |  34<H>  ----------------------------<  119<H>  4.6   |  24  |  0.72    Ca    8.8      09 Apr 2020 07:48  Phos  3.3     04-09  Mg     2.1     04-09    TPro  6.9  /  Alb  3.3  /  TBili  0.8  /  DBili  x   /  AST  51<H>  /  ALT  104<H>  /  AlkPhos  68  04-09                  RADIOLOGY, EKG & ADDITIONAL TESTS:   No New Imaging

## 2020-04-09 NOTE — PROGRESS NOTE ADULT - PROBLEM SELECTOR PLAN 6
F- None  E-replete as needed  N- mechanical soft diet, GLucerna TID (f/u nutrition recs)  C- FC  DVt ppx: Lovenox

## 2020-04-09 NOTE — PROGRESS NOTE ADULT - ASSESSMENT
79 yo M h/o BPH, HLD first had fever on 3/31 started plaquenil and azithro then had 02 sat in low 80s and came to ED then intubated. Found to have new Afib with RVR. Now s/p extubation on 4/3

## 2020-04-09 NOTE — PROGRESS NOTE ADULT - PROBLEM SELECTOR PLAN 3
Patient with a history of hypertension, noted to be hypotensive during this admission - likely multifactorial given sepsis from COVID infection vs sedation use for intubation vs paroxysmal a fib w/ RVR vs combination of above  - c/w midodrine 10 mg TID; wean as tolerated

## 2020-04-10 LAB
ALBUMIN SERPL ELPH-MCNC: 3 G/DL — LOW (ref 3.3–5)
ALP SERPL-CCNC: 73 U/L — SIGNIFICANT CHANGE UP (ref 40–120)
ALT FLD-CCNC: 116 U/L — HIGH (ref 10–45)
ANION GAP SERPL CALC-SCNC: 11 MMOL/L — SIGNIFICANT CHANGE UP (ref 5–17)
AST SERPL-CCNC: 53 U/L — HIGH (ref 10–40)
BASOPHILS # BLD AUTO: 0.02 K/UL — SIGNIFICANT CHANGE UP (ref 0–0.2)
BASOPHILS NFR BLD AUTO: 0.2 % — SIGNIFICANT CHANGE UP (ref 0–2)
BILIRUB SERPL-MCNC: 0.8 MG/DL — SIGNIFICANT CHANGE UP (ref 0.2–1.2)
BUN SERPL-MCNC: 36 MG/DL — HIGH (ref 7–23)
CALCIUM SERPL-MCNC: 9 MG/DL — SIGNIFICANT CHANGE UP (ref 8.4–10.5)
CHLORIDE SERPL-SCNC: 99 MMOL/L — SIGNIFICANT CHANGE UP (ref 96–108)
CO2 SERPL-SCNC: 27 MMOL/L — SIGNIFICANT CHANGE UP (ref 22–31)
CREAT SERPL-MCNC: 0.75 MG/DL — SIGNIFICANT CHANGE UP (ref 0.5–1.3)
EOSINOPHIL # BLD AUTO: 0.13 K/UL — SIGNIFICANT CHANGE UP (ref 0–0.5)
EOSINOPHIL NFR BLD AUTO: 1.4 % — SIGNIFICANT CHANGE UP (ref 0–6)
GLUCOSE SERPL-MCNC: 106 MG/DL — HIGH (ref 70–99)
HCT VFR BLD CALC: 48.9 % — SIGNIFICANT CHANGE UP (ref 39–50)
HGB BLD-MCNC: 15.7 G/DL — SIGNIFICANT CHANGE UP (ref 13–17)
IMM GRANULOCYTES NFR BLD AUTO: 1.7 % — HIGH (ref 0–1.5)
LYMPHOCYTES # BLD AUTO: 0.48 K/UL — LOW (ref 1–3.3)
LYMPHOCYTES # BLD AUTO: 5.2 % — LOW (ref 13–44)
MAGNESIUM SERPL-MCNC: 2 MG/DL — SIGNIFICANT CHANGE UP (ref 1.6–2.6)
MCHC RBC-ENTMCNC: 28.3 PG — SIGNIFICANT CHANGE UP (ref 27–34)
MCHC RBC-ENTMCNC: 32.1 GM/DL — SIGNIFICANT CHANGE UP (ref 32–36)
MCV RBC AUTO: 88.1 FL — SIGNIFICANT CHANGE UP (ref 80–100)
MONOCYTES # BLD AUTO: 0.42 K/UL — SIGNIFICANT CHANGE UP (ref 0–0.9)
MONOCYTES NFR BLD AUTO: 4.5 % — SIGNIFICANT CHANGE UP (ref 2–14)
NEUTROPHILS # BLD AUTO: 8.03 K/UL — HIGH (ref 1.8–7.4)
NEUTROPHILS NFR BLD AUTO: 87 % — HIGH (ref 43–77)
NRBC # BLD: 0 /100 WBCS — SIGNIFICANT CHANGE UP (ref 0–0)
PHOSPHATE SERPL-MCNC: 3 MG/DL — SIGNIFICANT CHANGE UP (ref 2.5–4.5)
PLATELET # BLD AUTO: 233 K/UL — SIGNIFICANT CHANGE UP (ref 150–400)
POTASSIUM SERPL-MCNC: 4.4 MMOL/L — SIGNIFICANT CHANGE UP (ref 3.5–5.3)
POTASSIUM SERPL-SCNC: 4.4 MMOL/L — SIGNIFICANT CHANGE UP (ref 3.5–5.3)
PROT SERPL-MCNC: 6.8 G/DL — SIGNIFICANT CHANGE UP (ref 6–8.3)
RBC # BLD: 5.55 M/UL — SIGNIFICANT CHANGE UP (ref 4.2–5.8)
RBC # FLD: 13.9 % — SIGNIFICANT CHANGE UP (ref 10.3–14.5)
SODIUM SERPL-SCNC: 137 MMOL/L — SIGNIFICANT CHANGE UP (ref 135–145)
WBC # BLD: 9.24 K/UL — SIGNIFICANT CHANGE UP (ref 3.8–10.5)
WBC # FLD AUTO: 9.24 K/UL — SIGNIFICANT CHANGE UP (ref 3.8–10.5)

## 2020-04-10 RX ORDER — TAMSULOSIN HYDROCHLORIDE 0.4 MG/1
0.4 CAPSULE ORAL AT BEDTIME
Refills: 0 | Status: DISCONTINUED | OUTPATIENT
Start: 2020-04-10 | End: 2020-04-24

## 2020-04-10 RX ADMIN — Medication 25 MILLIGRAM(S): at 08:54

## 2020-04-10 RX ADMIN — ENOXAPARIN SODIUM 80 MILLIGRAM(S): 100 INJECTION SUBCUTANEOUS at 05:37

## 2020-04-10 RX ADMIN — Medication 10 MILLIGRAM(S): at 11:52

## 2020-04-10 RX ADMIN — TAMSULOSIN HYDROCHLORIDE 0.4 MILLIGRAM(S): 0.4 CAPSULE ORAL at 20:32

## 2020-04-10 RX ADMIN — Medication 25 MILLIGRAM(S): at 20:31

## 2020-04-10 RX ADMIN — MIDODRINE HYDROCHLORIDE 5 MILLIGRAM(S): 2.5 TABLET ORAL at 14:23

## 2020-04-10 RX ADMIN — MIDODRINE HYDROCHLORIDE 5 MILLIGRAM(S): 2.5 TABLET ORAL at 20:32

## 2020-04-10 RX ADMIN — ENOXAPARIN SODIUM 80 MILLIGRAM(S): 100 INJECTION SUBCUTANEOUS at 17:39

## 2020-04-10 RX ADMIN — MIDODRINE HYDROCHLORIDE 5 MILLIGRAM(S): 2.5 TABLET ORAL at 05:37

## 2020-04-10 NOTE — PROGRESS NOTE ADULT - PROBLEM SELECTOR PLAN 4
Patient with episode of paroxysmal atrial fibrillation during admission, no record of prior atrial fibrillation  - currently on lovenox for full-dose anti coagulation and lopressor for rate control  - rate controlled currently; continue to monitor on telemetry

## 2020-04-10 NOTE — PROGRESS NOTE ADULT - SUBJECTIVE AND OBJECTIVE BOX
INTERVAL HPI/OVERNIGHT EVENTS: Central line removed yesterday. No acute events overnight. Patient seen and examined at bedside. Wants to go home but explained that unsafe to do so given oxygen requirements. Denies any CP, SOB, cough, fevers, chills, nausea, vomiting, abdominal pain, bleeding.    VITAL SIGNS:   Vital Signs Last 24 Hrs  T(C): 36.8 (10 Apr 2020 05:39), Max: 37.2 (09 Apr 2020 20:55)  T(F): 98.3 (10 Apr 2020 05:39), Max: 99 (09 Apr 2020 20:55)  HR: 55 (10 Apr 2020 06:14) (55 - 70)  BP: 101/59 (10 Apr 2020 05:39) (101/59 - 140/62)  BP(mean): --  RR: 24 (10 Apr 2020 06:14) (20 - 24)  SpO2: 94% (10 Apr 2020 06:14) (86% - 94%)    PHYSICAL EXAM:  GENERAL: elderly male; no acute distress; appears stated age  HEENT:  atraumatic, normocephalic, conjunctiva and sclera clear, on NRB  NECK: supple  LUNG: faint bibasilar crackles  HEART: bradycardic; S1 and S2 audible; no murmurs, rubs, or gallops  ABDOMEN: soft, nontender, nondistended; bowel sounds present in all four quadrants  EXTREMITIES:  2+ peripheral pulses bilaterally; no clubbing, cyanosis, or edema  NEUROLOGY: awake, alert, oriented x3; grossly intact with no focal deficits   SKIN: no rashes or lesions    LABS:                        15.7   9.24  )-----------( 233      ( 10 Apr 2020 06:52 )             48.9     04-10    137  |  99  |  36<H>  ----------------------------<  106<H>  4.4   |  27  |  0.75    Ca    9.0      10 Apr 2020 06:51  Phos  3.0     04-10  Mg     2.0     04-10    TPro  6.8  /  Alb  3.0<L>  /  TBili  0.8  /  DBili  x   /  AST  53<H>  /  ALT  116<H>  /  AlkPhos  73  04-10    LIVER FUNCTIONS - ( 10 Apr 2020 06:51 )  Alb: 3.0 g/dL / Pro: 6.8 g/dL / ALK PHOS: 73 U/L / ALT: 116 U/L / AST: 53 U/L / GGT: x                 RADIOLOGY & ADDITIONAL TESTS: reviewed

## 2020-04-10 NOTE — PROGRESS NOTE ADULT - ASSESSMENT
79 yo M h/o BPH, HLD first had fever on 3/31 started plaquenil and azithro then had 02 sat in low 80s and came to ED then intubated. Found to have new Afib with RVR.  s/p extubation on 4/3. s/p azithromycin, Plaquenil, and toci. On steroids.

## 2020-04-10 NOTE — PROGRESS NOTE ADULT - PROBLEM SELECTOR PLAN 3
Patient with a history of hypertension, noted to be hypotensive during this admission - likely multifactorial given sepsis from COVID infection vs sedation use for intubation vs paroxysmal a fib w/ RVR vs combination of above  - c/w midodrine 5 mg TID; wean as tolerated

## 2020-04-10 NOTE — PROGRESS NOTE ADULT - PROBLEM SELECTOR PLAN 1
Acute hypoxic RF 2/2 COVID-19 infection. Patient intubated on arrival. Completed azithromycin and hydroxychloroquine outpatient. Extubated 4/3  - Proning as needed if patient desats   - diurese as needed  - wean off oxygen; patient continues to be on NRB with SpO2 high 80s to low 90s

## 2020-04-10 NOTE — PROGRESS NOTE ADULT - PROBLEM SELECTOR PLAN 2
Presented with sepsis 2/2 to COVID. confirmed positive for COVID-19. CXR with bilateral infiltrates representing COVID viral pneumonia.  Now with downtrending leukocytosis and downtrending inflammatory biomarkers (D-dimer, CRP). Stable procalcitonin.   - s/p COVID bundle: azithromycin, hydroxychloroquine outpatient   - s/p tociluzumab 4/2  - c/w methylprednisolone 40mg IV BID (Start 4/2-4/9)- taper to d/c 4/10  - Blood cx  NGTD   - Tylenol PRN for fever  - Robitussin PRN for cough  - Avoid NSAIDs, ACE/ARB  - no nebulizers, MDI only  - COVID Isolation precautions: contact and droplet   - continue to monitor daily CBC w/ differential, CMP, Mg, Phos, CRP, procalcitonin, and ABG  - q3d labs: ESR, Tcell subset, d-dimer, LDH, ferritin, CK, trop, coags

## 2020-04-11 LAB — D DIMER BLD IA.RAPID-MCNC: 344 NG/ML DDU — HIGH

## 2020-04-11 RX ORDER — FAMOTIDINE 10 MG/ML
20 INJECTION INTRAVENOUS
Refills: 0 | Status: DISCONTINUED | OUTPATIENT
Start: 2020-04-11 | End: 2020-04-24

## 2020-04-11 RX ORDER — MIDODRINE HYDROCHLORIDE 2.5 MG/1
2.5 TABLET ORAL EVERY 8 HOURS
Refills: 0 | Status: DISCONTINUED | OUTPATIENT
Start: 2020-04-11 | End: 2020-04-14

## 2020-04-11 RX ADMIN — ENOXAPARIN SODIUM 80 MILLIGRAM(S): 100 INJECTION SUBCUTANEOUS at 17:46

## 2020-04-11 RX ADMIN — MIDODRINE HYDROCHLORIDE 2.5 MILLIGRAM(S): 2.5 TABLET ORAL at 21:55

## 2020-04-11 RX ADMIN — Medication 25 MILLIGRAM(S): at 19:12

## 2020-04-11 RX ADMIN — FAMOTIDINE 20 MILLIGRAM(S): 10 INJECTION INTRAVENOUS at 17:46

## 2020-04-11 RX ADMIN — TAMSULOSIN HYDROCHLORIDE 0.4 MILLIGRAM(S): 0.4 CAPSULE ORAL at 21:55

## 2020-04-11 RX ADMIN — MIDODRINE HYDROCHLORIDE 5 MILLIGRAM(S): 2.5 TABLET ORAL at 13:00

## 2020-04-11 RX ADMIN — ENOXAPARIN SODIUM 80 MILLIGRAM(S): 100 INJECTION SUBCUTANEOUS at 06:23

## 2020-04-11 RX ADMIN — MIDODRINE HYDROCHLORIDE 5 MILLIGRAM(S): 2.5 TABLET ORAL at 06:21

## 2020-04-11 NOTE — PROGRESS NOTE ADULT - PROBLEM SELECTOR PLAN 3
Patient with a history of hypertension, noted to be hypotensive during this admission - likely multifactorial given sepsis from COVID infection vs sedation use for intubation vs paroxysmal a fib w/ RVR vs combination of above  - c/w midodrine 5 mg TID; wean as tolerated Patient with a history of hypertension, noted to be hypotensive during this admission - likely multifactorial given sepsis from COVID infection vs sedation use for intubation vs paroxysmal a fib w/ RVR vs combination of above  - decrease midodrine to 2.5 mg TID (from 5 mg TID)

## 2020-04-11 NOTE — PROGRESS NOTE ADULT - PROBLEM SELECTOR PLAN 6
F- None  E-replete as needed  N- mechanical soft diet, Glucerna TID (f/u nutrition recs)  C- FC  DVt ppx: Lovenox F- None  E-replete as needed  N- mechanical soft diet, Glucerna TID (f/u nutrition recs)  C- full code  DVt ppx: enoxaparin   GI ppx: famotidine

## 2020-04-11 NOTE — PROGRESS NOTE ADULT - PROBLEM SELECTOR PLAN 5
- monitor urinary output;  - c/w home tamsulosin - monitor urinary output;  - c/w home tamsulosin 0.4 mg

## 2020-04-11 NOTE — PROGRESS NOTE ADULT - ASSESSMENT
79 yo M h/o BPH, HLD first had fever on 3/31 started plaquenil and azithro then had 02 sat in low 80s and came to ED then intubated. Found to have new Afib with RVR.  s/p extubation on 4/3. s/p azithromycin, Plaquenil, and toci. On steroids. 81 yo M h/o BPH, HLD first had fever on 3/31 started plaquenil and azithro then had 02 sat in low 80s and came to ED then intubated. Found to have new Afib with RVR.  s/p extubation on 4/3. s/p azithromycin, Plaquenil, and tocilizumab. On steroids.

## 2020-04-11 NOTE — PROGRESS NOTE ADULT - SUBJECTIVE AND OBJECTIVE BOX
***NOTE INCOMPLETE***    INTERVAL HPI/OVERNIGHT EVENTS: no acute events overnight.    REVIEW OF SYSTEMS: negative except as above     VITAL SIGNS:   Vital Signs Last 24 Hrs  T(C): 36.9 (11 Apr 2020 06:00), Max: 37.5 (10 Apr 2020 20:45)  T(F): 98.5 (11 Apr 2020 06:00), Max: 99.5 (10 Apr 2020 20:45)  HR: 55 (11 Apr 2020 06:00) (53 - 64)  BP: 115/55 (11 Apr 2020 06:00) (104/56 - 118/62)  BP(mean): --  RR: 22 (11 Apr 2020 06:00) (22 - 24)  SpO2: 90% (11 Apr 2020 06:00) (88% - 93%)    PHYSICAL EXAM:  GENERAL: no acute distress; appears stated age  HEENT:  atraumatic, normocephalic, conjunctiva and sclera clear, oropharynx clear, moist mucous membranes   NECK: supple  CHEST: no gross deformities   LUNG: clear to auscultation bilaterally; no wheezing, rales, rhonchi, or stridor  HEART: regular rate and rhythm; S1 and S2 audible; no murmurs, rubs, or gallops  ABDOMEN: soft, nontender, nondistended; bowel sounds present in all four quadrants  EXTREMITIES:  2+ peripheral pulses bilaterally; no clubbing, cyanosis, or edema  NEUROLOGY: awake, alert, oriented x3; grossly intact with no focal deficits   SKIN: no rashes or lesions    LABS:                        15.7   9.24  )-----------( 233      ( 10 Apr 2020 06:52 )             48.9     04-10    137  |  99  |  36<H>  ----------------------------<  106<H>  4.4   |  27  |  0.75    Ca    9.0      10 Apr 2020 06:51  Phos  3.0     04-10  Mg     2.0     04-10    TPro  6.8  /  Alb  3.0<L>  /  TBili  0.8  /  DBili  x   /  AST  53<H>  /  ALT  116<H>  /  AlkPhos  73  04-10    LIVER FUNCTIONS - ( 10 Apr 2020 06:51 )  Alb: 3.0 g/dL / Pro: 6.8 g/dL / ALK PHOS: 73 U/L / ALT: 116 U/L / AST: 53 U/L / GGT: x                 RADIOLOGY & ADDITIONAL TESTS: reviewed INTERVAL HPI/OVERNIGHT EVENTS: no acute events overnight.    Patient seen at bedside. On NRB with SpO2 around 90%. Reports continued cough w/ phlegm. Denies any fevers, chills, CP, SOB, nausea, vomiting, abdominal pain, diarrhea, urinary sx, bleeding. Tolerating PO.       VITAL SIGNS:   Vital Signs Last 24 Hrs  T(C): 36.9 (11 Apr 2020 06:00), Max: 37.5 (10 Apr 2020 20:45)  T(F): 98.5 (11 Apr 2020 06:00), Max: 99.5 (10 Apr 2020 20:45)  HR: 55 (11 Apr 2020 06:00) (53 - 64)  BP: 115/55 (11 Apr 2020 06:00) (104/56 - 118/62)  BP(mean): --  RR: 22 (11 Apr 2020 06:00) (22 - 24)  SpO2: 90% (11 Apr 2020 06:00) (88% - 93%)    PHYSICAL EXAM:  GENERAL: elderly male; no acute distress; appears stated age  HEENT:  atraumatic, normocephalic, conjunctiva and sclera clear, on NRB  NECK: supple  LUNG: faint bibasilar crackles  HEART: bradycardic; S1 and S2 audible; no murmurs, rubs, or gallops  ABDOMEN: soft, nontender, nondistended; bowel sounds present in all four quadrants  EXTREMITIES:  2+ peripheral pulses bilaterally; no clubbing, cyanosis, or edema  NEUROLOGY: awake, alert, oriented x3; grossly intact with no focal deficits   SKIN: no rashes or lesions    LABS:                        15.7   9.24  )-----------( 233      ( 10 Apr 2020 06:52 )             48.9     04-10    137  |  99  |  36<H>  ----------------------------<  106<H>  4.4   |  27  |  0.75    Ca    9.0      10 Apr 2020 06:51  Phos  3.0     04-10  Mg     2.0     04-10    TPro  6.8  /  Alb  3.0<L>  /  TBili  0.8  /  DBili  x   /  AST  53<H>  /  ALT  116<H>  /  AlkPhos  73  04-10    LIVER FUNCTIONS - ( 10 Apr 2020 06:51 )  Alb: 3.0 g/dL / Pro: 6.8 g/dL / ALK PHOS: 73 U/L / ALT: 116 U/L / AST: 53 U/L / GGT: x                 RADIOLOGY & ADDITIONAL TESTS: reviewed

## 2020-04-11 NOTE — PROGRESS NOTE ADULT - PROBLEM SELECTOR PLAN 2
Presented with sepsis 2/2 to COVID. confirmed positive for COVID-19. CXR with bilateral infiltrates representing COVID viral pneumonia.  Now with downtrending leukocytosis and downtrending inflammatory biomarkers (D-dimer, CRP). Stable procalcitonin.   - s/p COVID bundle: azithromycin, hydroxychloroquine outpatient   - s/p tociluzumab 4/2  - c/w methylprednisolone 40mg IV BID (Start 4/2-4/9)- taper to d/c 4/10  - Blood cx  NGTD   - Tylenol PRN for fever  - Robitussin PRN for cough  - Avoid NSAIDs, ACE/ARB  - no nebulizers, MDI only  - COVID Isolation precautions: contact and droplet   - continue to monitor daily CBC w/ differential, CMP, Mg, Phos, CRP, procalcitonin, and ABG  - q3d labs: ESR, Tcell subset, d-dimer, LDH, ferritin, CK, trop, coags Presented with sepsis 2/2 to COVID. confirmed positive for COVID-19. CXR with bilateral infiltrates representing COVID viral pneumonia.  Now with downtrending leukocytosis and downtrending inflammatory biomarkers (D-dimer, CRP). Stable procalcitonin.   - s/p COVID bundle: azithromycin, hydroxychloroquine outpatient   - s/p tociluzumab 4/2  - c/w methylprednisolone 40mg IV BID (Start 4/2-4/9)- taper to d/c 4/10  - Blood cx  NGTD   - Tylenol PRN for fever  - Robitussin PRN for cough  - Avoid NSAIDs, ACE/ARB  - no nebulizers, MDI only  - COVID Isolation precautions: contact and droplet   - continue to monitor daily CBC w/ differential, CMP, Mg, Phos, CRP

## 2020-04-11 NOTE — PROGRESS NOTE ADULT - PROBLEM SELECTOR PLAN 4
Patient with episode of paroxysmal atrial fibrillation during admission, no record of prior atrial fibrillation  - currently on lovenox for full-dose anti coagulation and lopressor for rate control  - rate controlled currently; continue to monitor on telemetry Patient with episode of paroxysmal atrial fibrillation during admission, no record of prior atrial fibrillation  - Anti coagulation: enoxaparin   - Rate control: metoprolol 25 mg Q12H   - rate controlled currently; continue to monitor on telemetry

## 2020-04-12 LAB
ALBUMIN SERPL ELPH-MCNC: 2.7 G/DL — LOW (ref 3.3–5)
ALP SERPL-CCNC: 64 U/L — SIGNIFICANT CHANGE UP (ref 40–120)
ALT FLD-CCNC: 67 U/L — HIGH (ref 10–45)
ANION GAP SERPL CALC-SCNC: 9 MMOL/L — SIGNIFICANT CHANGE UP (ref 5–17)
AST SERPL-CCNC: 30 U/L — SIGNIFICANT CHANGE UP (ref 10–40)
BASOPHILS # BLD AUTO: 0 K/UL — SIGNIFICANT CHANGE UP (ref 0–0.2)
BASOPHILS NFR BLD AUTO: 0 % — SIGNIFICANT CHANGE UP (ref 0–2)
BILIRUB SERPL-MCNC: 0.9 MG/DL — SIGNIFICANT CHANGE UP (ref 0.2–1.2)
BUN SERPL-MCNC: 24 MG/DL — HIGH (ref 7–23)
CALCIUM SERPL-MCNC: 8.2 MG/DL — LOW (ref 8.4–10.5)
CHLORIDE SERPL-SCNC: 99 MMOL/L — SIGNIFICANT CHANGE UP (ref 96–108)
CO2 SERPL-SCNC: 26 MMOL/L — SIGNIFICANT CHANGE UP (ref 22–31)
CREAT SERPL-MCNC: 0.68 MG/DL — SIGNIFICANT CHANGE UP (ref 0.5–1.3)
CRP SERPL-MCNC: 0.34 MG/DL — SIGNIFICANT CHANGE UP (ref 0–0.4)
EOSINOPHIL # BLD AUTO: 0.22 K/UL — SIGNIFICANT CHANGE UP (ref 0–0.5)
EOSINOPHIL NFR BLD AUTO: 3.5 % — SIGNIFICANT CHANGE UP (ref 0–6)
FERRITIN SERPL-MCNC: 914 NG/ML — HIGH (ref 30–400)
GLUCOSE SERPL-MCNC: 131 MG/DL — HIGH (ref 70–99)
HCT VFR BLD CALC: 44.6 % — SIGNIFICANT CHANGE UP (ref 39–50)
HGB BLD-MCNC: 14.7 G/DL — SIGNIFICANT CHANGE UP (ref 13–17)
LYMPHOCYTES # BLD AUTO: 0.38 K/UL — LOW (ref 1–3.3)
LYMPHOCYTES # BLD AUTO: 6 % — LOW (ref 13–44)
MAGNESIUM SERPL-MCNC: 1.8 MG/DL — SIGNIFICANT CHANGE UP (ref 1.6–2.6)
MCHC RBC-ENTMCNC: 28.7 PG — SIGNIFICANT CHANGE UP (ref 27–34)
MCHC RBC-ENTMCNC: 33 GM/DL — SIGNIFICANT CHANGE UP (ref 32–36)
MCV RBC AUTO: 86.9 FL — SIGNIFICANT CHANGE UP (ref 80–100)
MONOCYTES # BLD AUTO: 0.38 K/UL — SIGNIFICANT CHANGE UP (ref 0–0.9)
MONOCYTES NFR BLD AUTO: 6 % — SIGNIFICANT CHANGE UP (ref 2–14)
NEUTROPHILS # BLD AUTO: 5.41 K/UL — SIGNIFICANT CHANGE UP (ref 1.8–7.4)
NEUTROPHILS NFR BLD AUTO: 84.5 % — HIGH (ref 43–77)
PHOSPHATE SERPL-MCNC: 2.5 MG/DL — SIGNIFICANT CHANGE UP (ref 2.5–4.5)
PLATELET # BLD AUTO: 174 K/UL — SIGNIFICANT CHANGE UP (ref 150–400)
POTASSIUM SERPL-MCNC: 3.8 MMOL/L — SIGNIFICANT CHANGE UP (ref 3.5–5.3)
POTASSIUM SERPL-SCNC: 3.8 MMOL/L — SIGNIFICANT CHANGE UP (ref 3.5–5.3)
PROT SERPL-MCNC: 5.9 G/DL — LOW (ref 6–8.3)
RBC # BLD: 5.13 M/UL — SIGNIFICANT CHANGE UP (ref 4.2–5.8)
RBC # FLD: 14.2 % — SIGNIFICANT CHANGE UP (ref 10.3–14.5)
SODIUM SERPL-SCNC: 134 MMOL/L — LOW (ref 135–145)
WBC # BLD: 6.4 K/UL — SIGNIFICANT CHANGE UP (ref 3.8–10.5)
WBC # FLD AUTO: 6.4 K/UL — SIGNIFICANT CHANGE UP (ref 3.8–10.5)

## 2020-04-12 RX ORDER — MAGNESIUM SULFATE 500 MG/ML
2 VIAL (ML) INJECTION ONCE
Refills: 0 | Status: COMPLETED | OUTPATIENT
Start: 2020-04-12 | End: 2020-04-12

## 2020-04-12 RX ORDER — POTASSIUM CHLORIDE 20 MEQ
20 PACKET (EA) ORAL ONCE
Refills: 0 | Status: COMPLETED | OUTPATIENT
Start: 2020-04-12 | End: 2020-04-12

## 2020-04-12 RX ORDER — METOPROLOL TARTRATE 50 MG
25 TABLET ORAL DAILY
Refills: 0 | Status: DISCONTINUED | OUTPATIENT
Start: 2020-04-13 | End: 2020-04-24

## 2020-04-12 RX ORDER — MAGNESIUM SULFATE 500 MG/ML
2 VIAL (ML) INJECTION ONCE
Refills: 0 | Status: DISCONTINUED | OUTPATIENT
Start: 2020-04-12 | End: 2020-04-12

## 2020-04-12 RX ADMIN — Medication 20 MILLIEQUIVALENT(S): at 12:41

## 2020-04-12 RX ADMIN — ENOXAPARIN SODIUM 80 MILLIGRAM(S): 100 INJECTION SUBCUTANEOUS at 04:37

## 2020-04-12 RX ADMIN — Medication 100 MILLIGRAM(S): at 12:41

## 2020-04-12 RX ADMIN — FAMOTIDINE 20 MILLIGRAM(S): 10 INJECTION INTRAVENOUS at 16:53

## 2020-04-12 RX ADMIN — TAMSULOSIN HYDROCHLORIDE 0.4 MILLIGRAM(S): 0.4 CAPSULE ORAL at 22:53

## 2020-04-12 RX ADMIN — MIDODRINE HYDROCHLORIDE 2.5 MILLIGRAM(S): 2.5 TABLET ORAL at 12:41

## 2020-04-12 RX ADMIN — FAMOTIDINE 20 MILLIGRAM(S): 10 INJECTION INTRAVENOUS at 04:38

## 2020-04-12 RX ADMIN — Medication 50 GRAM(S): at 12:42

## 2020-04-12 RX ADMIN — MIDODRINE HYDROCHLORIDE 2.5 MILLIGRAM(S): 2.5 TABLET ORAL at 04:37

## 2020-04-12 RX ADMIN — ENOXAPARIN SODIUM 80 MILLIGRAM(S): 100 INJECTION SUBCUTANEOUS at 16:53

## 2020-04-12 RX ADMIN — MIDODRINE HYDROCHLORIDE 2.5 MILLIGRAM(S): 2.5 TABLET ORAL at 22:53

## 2020-04-12 NOTE — PROGRESS NOTE ADULT - PROBLEM SELECTOR PLAN 2
Presented with sepsis 2/2 to COVID. confirmed positive for COVID-19. CXR with bilateral infiltrates representing COVID viral pneumonia.  Now with downtrending leukocytosis and downtrending inflammatory biomarkers (D-dimer, CRP). Stable procalcitonin.   - s/p COVID bundle: azithromycin, hydroxychloroquine, completed   - s/p tociluzumab 4/2  - completed methylprednisolone 40mg IV BID.  - Blood cx  NGTD   - Tylenol PRN for fever  - Robitussin PRN for cough  - Avoid NSAIDs, ACE/ARB  - no nebulizers, MDI only  - COVID Isolation precautions: contact and droplet   - continue to monitor daily CBC w/ differential, CMP, Mg, Phos, CRP

## 2020-04-12 NOTE — PROGRESS NOTE ADULT - PROBLEM SELECTOR PLAN 4
Patient with episode of paroxysmal atrial fibrillation during admission, no record of prior atrial fibrillation  - Anti coagulation: enoxaparin   - Rate control: metoprolol 25 mg Q12H   - rate controlled currently; continue to monitor on telemetry

## 2020-04-12 NOTE — PROGRESS NOTE ADULT - PROBLEM SELECTOR PLAN 6
F- None  E-replete as needed  N- mechanical soft diet, Glucerna TID (f/u nutrition recs)  C- full code  DVt ppx: enoxaparin   GI ppx: famotidine

## 2020-04-12 NOTE — PROGRESS NOTE ADULT - PROBLEM SELECTOR PLAN 3
Patient with a history of hypertension, noted to be hypotensive during this admission - likely multifactorial given sepsis from COVID infection vs sedation use for intubation vs paroxysmal a fib w/ RVR vs combination of above  - midodrine 2.5 mg TID

## 2020-04-12 NOTE — PROGRESS NOTE ADULT - PROBLEM SELECTOR PLAN 1
Acute hypoxic RF 2/2 COVID-19 infection. Patient intubated on arrival. Completed azithromycin and hydroxychloroquine outpatient. Extubated 4/3  - Proning as needed if patient desats   - diurese as needed  - wean off oxygen; patient continues to be on NRB with SpO2 high 80s to low 90s Acute hypoxic RF 2/2 COVID-19 infection. Patient intubated on arrival. Completed azithromycin and hydroxychloroquine outpatient. Extubated 4/3  - Proning as needed if patient desats   - eval for fibrosis on CT noncon  - diurese as needed  - wean off oxygen; patient continues to be on NRB with SpO2 high 80s to low 90s

## 2020-04-12 NOTE — PROGRESS NOTE ADULT - SUBJECTIVE AND OBJECTIVE BOX
Medicine Progress Note    Patient is a 80y old  Male who presents with a chief complaint of COVID (08 Apr 2020 11:16)      SUBJECTIVE / OVERNIGHT EVENTS:  No acute events, tolerating NRB. No difficulty in diet. Some anxiety in recovery process but otherwise doing well. C/o dry cough.    ADDITIONAL REVIEW OF SYSTEMS:    MEDICATIONS  (STANDING):  dextrose 5%. 1000 milliLiter(s) (50 mL/Hr) IV Continuous <Continuous>  dextrose 50% Injectable 12.5 Gram(s) IV Push once  dextrose 50% Injectable 25 Gram(s) IV Push once  dextrose 50% Injectable 25 Gram(s) IV Push once  enoxaparin Injectable 80 milliGRAM(s) SubCutaneous every 12 hours  famotidine    Tablet 20 milliGRAM(s) Oral two times a day  insulin lispro (HumaLOG) corrective regimen sliding scale   SubCutaneous Before meals and at bedtime  magnesium sulfate  IVPB 2 Gram(s) IV Intermittent once  metoprolol tartrate 25 milliGRAM(s) Oral every 12 hours  midodrine 2.5 milliGRAM(s) Oral every 8 hours  potassium chloride   Powder 20 milliEquivalent(s) Oral once  tamsulosin 0.4 milliGRAM(s) Oral at bedtime    MEDICATIONS  (PRN):  acetaminophen    Suspension .. 650 milliGRAM(s) Enteral Tube every 6 hours PRN Temp greater or equal to 38C (100.4F), Moderate Pain (4 - 6)  dextrose 40% Gel 15 Gram(s) Oral once PRN Blood Glucose LESS THAN 70 milliGRAM(s)/deciliter  glucagon  Injectable 1 milliGRAM(s) IntraMuscular once PRN Glucose LESS THAN 70 milligrams/deciliter    CAPILLARY BLOOD GLUCOSE      POCT Blood Glucose.: 83 mg/dL (12 Apr 2020 07:07)  POCT Blood Glucose.: 93 mg/dL (11 Apr 2020 21:20)  POCT Blood Glucose.: 97 mg/dL (11 Apr 2020 15:58)    I&O's Summary    11 Apr 2020 07:01  -  12 Apr 2020 07:00  --------------------------------------------------------  IN: 0 mL / OUT: 700 mL / NET: -700 mL        PHYSICAL EXAM:  Vital Signs Last 24 Hrs  T(C): 37.1 (12 Apr 2020 09:13), Max: 37.2 (11 Apr 2020 20:41)  T(F): 98.8 (12 Apr 2020 09:13), Max: 99 (11 Apr 2020 20:41)  HR: 63 (12 Apr 2020 09:13) (54 - 66)  BP: 103/55 (12 Apr 2020 09:13) (89/52 - 117/54)  BP(mean): --  RR: 18 (12 Apr 2020 09:13) (18 - 20)  SpO2: 90% (12 Apr 2020 09:13) (88% - 95%)  CONSTITUTIONAL: NAD, well-developed, well-groomed  ENMT: Moist oral mucosa, no pharyngeal injection or exudates; normal dentition  RESPIRATORY: On NRB, normal breath sounds  CARDIOVASCULAR: Regular rate and rhythm, normal S1 and S2, no murmur/rub/gallop; No lower extremity edema; Peripheral pulses are 2+ bilaterally  ABDOMEN: Nontender to palpation, normoactive bowel sounds, no rebound/guarding; No hepatosplenomegaly  PSYCH: A+O to person, place, and time; affect appropriate  NEUROLOGY: CN 2-12 are intact and symmetric; no gross sensory deficits   SKIN: No rashes; no palpable lesions    LABS:                        14.7   6.40  )-----------( 174      ( 12 Apr 2020 09:53 )             44.6     04-12    134<L>  |  99  |  24<H>  ----------------------------<  131<H>  3.8   |  26  |  0.68    Ca    8.2<L>      12 Apr 2020 09:53  Phos  2.5     04-12  Mg     1.8     04-12    TPro  5.9<L>  /  Alb  2.7<L>  /  TBili  0.9  /  DBili  x   /  AST  30  /  ALT  67<H>  /  AlkPhos  64  04-12              COVID-19 PCR: Detected (02 Apr 2020 11:24)      RADIOLOGY & ADDITIONAL TESTS:  Imaging from Last 24 Hours: no    Electrocardiogram/QTc Interval: normal sinus rhythm    COORDINATION OF CARE:  Care Discussed with Consultants/Other Providers: no Medicine Progress Note    Patient is a 80y old  Male who presents with a chief complaint of COVID (08 Apr 2020 11:16)      SUBJECTIVE / OVERNIGHT EVENTS:  No acute events, tolerating NRB. No difficulty in diet. Some anxiety in recovery process and having few episodes of dry cough but otherwise doing well.     ADDITIONAL REVIEW OF SYSTEMS:    MEDICATIONS  (STANDING):  dextrose 5%. 1000 milliLiter(s) (50 mL/Hr) IV Continuous <Continuous>  dextrose 50% Injectable 12.5 Gram(s) IV Push once  dextrose 50% Injectable 25 Gram(s) IV Push once  dextrose 50% Injectable 25 Gram(s) IV Push once  enoxaparin Injectable 80 milliGRAM(s) SubCutaneous every 12 hours  famotidine    Tablet 20 milliGRAM(s) Oral two times a day  insulin lispro (HumaLOG) corrective regimen sliding scale   SubCutaneous Before meals and at bedtime  magnesium sulfate  IVPB 2 Gram(s) IV Intermittent once  metoprolol tartrate 25 milliGRAM(s) Oral every 12 hours  midodrine 2.5 milliGRAM(s) Oral every 8 hours  potassium chloride   Powder 20 milliEquivalent(s) Oral once  tamsulosin 0.4 milliGRAM(s) Oral at bedtime    MEDICATIONS  (PRN):  acetaminophen    Suspension .. 650 milliGRAM(s) Enteral Tube every 6 hours PRN Temp greater or equal to 38C (100.4F), Moderate Pain (4 - 6)  dextrose 40% Gel 15 Gram(s) Oral once PRN Blood Glucose LESS THAN 70 milliGRAM(s)/deciliter  glucagon  Injectable 1 milliGRAM(s) IntraMuscular once PRN Glucose LESS THAN 70 milligrams/deciliter    CAPILLARY BLOOD GLUCOSE      POCT Blood Glucose.: 83 mg/dL (12 Apr 2020 07:07)  POCT Blood Glucose.: 93 mg/dL (11 Apr 2020 21:20)  POCT Blood Glucose.: 97 mg/dL (11 Apr 2020 15:58)    I&O's Summary    11 Apr 2020 07:01  -  12 Apr 2020 07:00  --------------------------------------------------------  IN: 0 mL / OUT: 700 mL / NET: -700 mL        PHYSICAL EXAM:  Vital Signs Last 24 Hrs  T(C): 37.1 (12 Apr 2020 09:13), Max: 37.2 (11 Apr 2020 20:41)  T(F): 98.8 (12 Apr 2020 09:13), Max: 99 (11 Apr 2020 20:41)  HR: 63 (12 Apr 2020 09:13) (54 - 66)  BP: 103/55 (12 Apr 2020 09:13) (89/52 - 117/54)  BP(mean): --  RR: 18 (12 Apr 2020 09:13) (18 - 20)  SpO2: 90% (12 Apr 2020 09:13) (88% - 95%)  CONSTITUTIONAL: NAD, well-developed, well-groomed  ENMT: Moist oral mucosa, no pharyngeal injection or exudates; normal dentition  RESPIRATORY: On NRB, normal breath sounds  CARDIOVASCULAR: Regular rate and rhythm, normal S1 and S2, no murmur/rub/gallop; No lower extremity edema; Peripheral pulses are 2+ bilaterally  ABDOMEN: Nontender to palpation, normoactive bowel sounds, no rebound/guarding; No hepatosplenomegaly  PSYCH: A+O to person, place, and time; affect appropriate  NEUROLOGY: CN 2-12 are intact and symmetric; no gross sensory deficits   SKIN: No rashes; no palpable lesions    LABS:                        14.7   6.40  )-----------( 174      ( 12 Apr 2020 09:53 )             44.6     04-12    134<L>  |  99  |  24<H>  ----------------------------<  131<H>  3.8   |  26  |  0.68    Ca    8.2<L>      12 Apr 2020 09:53  Phos  2.5     04-12  Mg     1.8     04-12    TPro  5.9<L>  /  Alb  2.7<L>  /  TBili  0.9  /  DBili  x   /  AST  30  /  ALT  67<H>  /  AlkPhos  64  04-12              COVID-19 PCR: Detected (02 Apr 2020 11:24)      RADIOLOGY & ADDITIONAL TESTS:  Imaging from Last 24 Hours: no    Electrocardiogram/QTc Interval: normal sinus rhythm    COORDINATION OF CARE:  Care Discussed with Consultants/Other Providers: no

## 2020-04-12 NOTE — PROGRESS NOTE ADULT - ASSESSMENT
79 yo M h/o BPH, HLD first had fever on 3/31 started plaquenil and azithro then had 02 sat in low 80s and came to ED then intubated. Found to have new Afib with RVR.  s/p extubation on 4/3. s/p azithromycin, Plaquenil, and tocilizumab. On steroids.

## 2020-04-13 LAB
ALBUMIN SERPL ELPH-MCNC: 2.6 G/DL — LOW (ref 3.3–5)
ALP SERPL-CCNC: 63 U/L — SIGNIFICANT CHANGE UP (ref 40–120)
ALT FLD-CCNC: 68 U/L — HIGH (ref 10–45)
ANION GAP SERPL CALC-SCNC: 7 MMOL/L — SIGNIFICANT CHANGE UP (ref 5–17)
AST SERPL-CCNC: 35 U/L — SIGNIFICANT CHANGE UP (ref 10–40)
BASOPHILS # BLD AUTO: 0.01 K/UL — SIGNIFICANT CHANGE UP (ref 0–0.2)
BASOPHILS NFR BLD AUTO: 0.2 % — SIGNIFICANT CHANGE UP (ref 0–2)
BILIRUB SERPL-MCNC: 0.8 MG/DL — SIGNIFICANT CHANGE UP (ref 0.2–1.2)
BUN SERPL-MCNC: 19 MG/DL — SIGNIFICANT CHANGE UP (ref 7–23)
CALCIUM SERPL-MCNC: 8.3 MG/DL — LOW (ref 8.4–10.5)
CHLORIDE SERPL-SCNC: 102 MMOL/L — SIGNIFICANT CHANGE UP (ref 96–108)
CO2 SERPL-SCNC: 24 MMOL/L — SIGNIFICANT CHANGE UP (ref 22–31)
CREAT SERPL-MCNC: 0.68 MG/DL — SIGNIFICANT CHANGE UP (ref 0.5–1.3)
CRP SERPL-MCNC: 0.32 MG/DL — SIGNIFICANT CHANGE UP (ref 0–0.4)
D DIMER BLD IA.RAPID-MCNC: 283 NG/ML DDU — HIGH
EOSINOPHIL # BLD AUTO: 0.08 K/UL — SIGNIFICANT CHANGE UP (ref 0–0.5)
EOSINOPHIL NFR BLD AUTO: 1.4 % — SIGNIFICANT CHANGE UP (ref 0–6)
FERRITIN SERPL-MCNC: 842 NG/ML — HIGH (ref 30–400)
GLUCOSE SERPL-MCNC: 102 MG/DL — HIGH (ref 70–99)
HCT VFR BLD CALC: 43.3 % — SIGNIFICANT CHANGE UP (ref 39–50)
HGB BLD-MCNC: 14.4 G/DL — SIGNIFICANT CHANGE UP (ref 13–17)
IMM GRANULOCYTES NFR BLD AUTO: 1.1 % — SIGNIFICANT CHANGE UP (ref 0–1.5)
LYMPHOCYTES # BLD AUTO: 0.57 K/UL — LOW (ref 1–3.3)
LYMPHOCYTES # BLD AUTO: 10.1 % — LOW (ref 13–44)
MAGNESIUM SERPL-MCNC: 2 MG/DL — SIGNIFICANT CHANGE UP (ref 1.6–2.6)
MCHC RBC-ENTMCNC: 28.9 PG — SIGNIFICANT CHANGE UP (ref 27–34)
MCHC RBC-ENTMCNC: 33.3 GM/DL — SIGNIFICANT CHANGE UP (ref 32–36)
MCV RBC AUTO: 86.9 FL — SIGNIFICANT CHANGE UP (ref 80–100)
MONOCYTES # BLD AUTO: 0.55 K/UL — SIGNIFICANT CHANGE UP (ref 0–0.9)
MONOCYTES NFR BLD AUTO: 9.8 % — SIGNIFICANT CHANGE UP (ref 2–14)
NEUTROPHILS # BLD AUTO: 4.37 K/UL — SIGNIFICANT CHANGE UP (ref 1.8–7.4)
NEUTROPHILS NFR BLD AUTO: 77.4 % — HIGH (ref 43–77)
NRBC # BLD: 0 /100 WBCS — SIGNIFICANT CHANGE UP (ref 0–0)
PHOSPHATE SERPL-MCNC: 2.6 MG/DL — SIGNIFICANT CHANGE UP (ref 2.5–4.5)
PLATELET # BLD AUTO: 154 K/UL — SIGNIFICANT CHANGE UP (ref 150–400)
POTASSIUM SERPL-MCNC: 4.1 MMOL/L — SIGNIFICANT CHANGE UP (ref 3.5–5.3)
POTASSIUM SERPL-SCNC: 4.1 MMOL/L — SIGNIFICANT CHANGE UP (ref 3.5–5.3)
PROT SERPL-MCNC: 5.9 G/DL — LOW (ref 6–8.3)
RBC # BLD: 4.98 M/UL — SIGNIFICANT CHANGE UP (ref 4.2–5.8)
RBC # FLD: 14.1 % — SIGNIFICANT CHANGE UP (ref 10.3–14.5)
SODIUM SERPL-SCNC: 133 MMOL/L — LOW (ref 135–145)
WBC # BLD: 5.64 K/UL — SIGNIFICANT CHANGE UP (ref 3.8–10.5)
WBC # FLD AUTO: 5.64 K/UL — SIGNIFICANT CHANGE UP (ref 3.8–10.5)

## 2020-04-13 PROCEDURE — 71250 CT THORAX DX C-: CPT | Mod: 26

## 2020-04-13 RX ADMIN — MIDODRINE HYDROCHLORIDE 2.5 MILLIGRAM(S): 2.5 TABLET ORAL at 21:16

## 2020-04-13 RX ADMIN — FAMOTIDINE 20 MILLIGRAM(S): 10 INJECTION INTRAVENOUS at 06:44

## 2020-04-13 RX ADMIN — FAMOTIDINE 20 MILLIGRAM(S): 10 INJECTION INTRAVENOUS at 16:26

## 2020-04-13 RX ADMIN — ENOXAPARIN SODIUM 80 MILLIGRAM(S): 100 INJECTION SUBCUTANEOUS at 16:26

## 2020-04-13 RX ADMIN — MIDODRINE HYDROCHLORIDE 2.5 MILLIGRAM(S): 2.5 TABLET ORAL at 06:44

## 2020-04-13 RX ADMIN — TAMSULOSIN HYDROCHLORIDE 0.4 MILLIGRAM(S): 0.4 CAPSULE ORAL at 21:19

## 2020-04-13 RX ADMIN — ENOXAPARIN SODIUM 80 MILLIGRAM(S): 100 INJECTION SUBCUTANEOUS at 06:44

## 2020-04-13 RX ADMIN — MIDODRINE HYDROCHLORIDE 2.5 MILLIGRAM(S): 2.5 TABLET ORAL at 11:23

## 2020-04-13 NOTE — PROGRESS NOTE ADULT - SUBJECTIVE AND OBJECTIVE BOX
Patient disgruntled this AM because he feels he is not improving. Desatted to mid 80's on back during encounter. When proned properly his sats improved to 96%. Patient instructed on importance of proning.    T(C): 36.2 (04-13-20 @ 09:32), Max: 37.1 (04-12-20 @ 12:30)  HR: 58 (04-13-20 @ 09:32) (54 - 62)  BP: 95/50 (04-13-20 @ 09:32) (90/55 - 124/55)  RR: 20 (04-13-20 @ 09:32) (18 - 20)  SpO2: 91% (04-13-20 @ 09:32) (88% - 93%)  Wt(kg): --Vital Signs Last 24 Hrs      Review of Systems:  -All other ROS negative, except those noted in HPI    PHYSICAL EXAM:  GENERAL: elderly male; no acute distress; appears stated age  HEENT:  atraumatic, normocephalic, conjunctiva and sclera clear, on NRB  NECK: supple  LUNG: CTAB B/L, nontacyhpneic, no accesory muscle use  HEART:; S1 and S2 audible; no murmurs, rubs, or gallops  ABDOMEN: soft, nontender, nondistended; bowel sounds present in all four quadrants  EXTREMITIES:  2+ peripheral pulses bilaterally; no clubbing, cyanosis, or edema  NEUROLOGY: awake, alert, oriented x3; grossly intact with no focal deficits   SKIN: no rashes or lesions    acetaminophen    Suspension .. 650 milliGRAM(s) Enteral Tube every 6 hours PRN  dextrose 40% Gel 15 Gram(s) Oral once PRN  dextrose 5%. 1000 milliLiter(s) IV Continuous <Continuous>  dextrose 50% Injectable 12.5 Gram(s) IV Push once  dextrose 50% Injectable 25 Gram(s) IV Push once  dextrose 50% Injectable 25 Gram(s) IV Push once  enoxaparin Injectable 80 milliGRAM(s) SubCutaneous every 12 hours  famotidine    Tablet 20 milliGRAM(s) Oral two times a day  glucagon  Injectable 1 milliGRAM(s) IntraMuscular once PRN  metoprolol succinate ER 25 milliGRAM(s) Oral daily  midodrine 2.5 milliGRAM(s) Oral every 8 hours  tamsulosin 0.4 milliGRAM(s) Oral at bedtime      LABS:                        14.4   5.64  )-----------( 154      ( 13 Apr 2020 06:09 )             43.3     04-13    133<L>  |  102  |  19  ----------------------------<  102<H>  4.1   |  24  |  0.68    Ca    8.3<L>      13 Apr 2020 06:09  Phos  2.6     04-13  Mg     2.0     04-13    TPro  5.9<L>  /  Alb  2.6<L>  /  TBili  0.8  /  DBili  <0.2  /  AST  35  /  ALT  68<H>  /  AlkPhos  63  04-13        CAPILLARY BLOOD GLUCOSE      POCT Blood Glucose.: 98 mg/dL (13 Apr 2020 06:44)  POCT Blood Glucose.: 100 mg/dL (12 Apr 2020 22:14)  POCT Blood Glucose.: 95 mg/dL (12 Apr 2020 17:01)  POCT Blood Glucose.: 98 mg/dL (12 Apr 2020 11:59)

## 2020-04-13 NOTE — PROGRESS NOTE ADULT - PROBLEM SELECTOR PLAN 1
Acute hypoxic RF 2/2 COVID-19 infection. Patient intubated on arrival. Completed azithromycin and hydroxychloroquine outpatient. Extubated 4/3  - Proning as needed if patient desats   - eval for fibrosis on CT noncon  - diurese as needed  - wean off oxygen; patient continues to be on NRB with SpO2 high 80s to low 90s

## 2020-04-13 NOTE — PROGRESS NOTE ADULT - PROBLEM SELECTOR PLAN 4
Patient with episode of paroxysmal atrial fibrillation during admission, no record of prior atrial fibrillation  - Anti coagulation: enoxaparin   - Rate control: metoprolol 25 mg succinate (was on lopressor 25 mg BID prior)  - rate controlled currently; continue to monitor on telemetry

## 2020-04-13 NOTE — CHART NOTE - NSCHARTNOTEFT_GEN_A_CORE
Admitting Diagnosis:   Patient is a 80y old  Male who presents with a chief complaint of COVID (13 Apr 2020 10:20)      PAST MEDICAL & SURGICAL HISTORY:  BPH (benign prostatic hyperplasia)  HLD (hyperlipidemia)  BPH (benign prostatic hyperplasia)  Hypertension  No significant past surgical history      Current Nutrition Order: mech soft, glucerna x3 3/day   PO Intake: Good (%) [   ]  Fair (50-75%) [   ] Poor (<25%) [   ]- Please See Below   GI Issues: BM+ 4/11   Pain: none per flow sheets   Skin Integrity: No edema, Stage I Sacrum pressure ulcer noted     Labs:   04-13    133<L>  |  102  |  19  ----------------------------<  102<H>  4.1   |  24  |  0.68    Ca    8.3<L>      13 Apr 2020 06:09  Phos  2.6     04-13  Mg     2.0     04-13    TPro  5.9<L>  /  Alb  2.6<L>  /  TBili  0.8  /  DBili  <0.2  /  AST  35  /  ALT  68<H>  /  AlkPhos  63  04-13    CAPILLARY BLOOD GLUCOSE      POCT Blood Glucose.: 106 mg/dL (13 Apr 2020 16:42)  POCT Blood Glucose.: 120 mg/dL (13 Apr 2020 12:14)  POCT Blood Glucose.: 98 mg/dL (13 Apr 2020 06:44)  POCT Blood Glucose.: 100 mg/dL (12 Apr 2020 22:14)      Medications:  MEDICATIONS  (STANDING):  dextrose 5%. 1000 milliLiter(s) (50 mL/Hr) IV Continuous <Continuous>  dextrose 50% Injectable 12.5 Gram(s) IV Push once  dextrose 50% Injectable 25 Gram(s) IV Push once  dextrose 50% Injectable 25 Gram(s) IV Push once  enoxaparin Injectable 80 milliGRAM(s) SubCutaneous every 12 hours  famotidine    Tablet 20 milliGRAM(s) Oral two times a day  metoprolol succinate ER 25 milliGRAM(s) Oral daily  midodrine 2.5 milliGRAM(s) Oral every 8 hours  tamsulosin 0.4 milliGRAM(s) Oral at bedtime    MEDICATIONS  (PRN):  acetaminophen    Suspension .. 650 milliGRAM(s) Enteral Tube every 6 hours PRN Temp greater or equal to 38C (100.4F), Moderate Pain (4 - 6)  dextrose 40% Gel 15 Gram(s) Oral once PRN Blood Glucose LESS THAN 70 milliGRAM(s)/deciliter  glucagon  Injectable 1 milliGRAM(s) IntraMuscular once PRN Glucose LESS THAN 70 milligrams/deciliter      6'2''  pounds +-10%  4/2 195 pounds  BMI 25.1 %OHM=333%   Based on most recent EMR wt     Nutrition Focused Physical Exam: Completed [   ]  Not Pertinent [   ]- NA COVID    Estimated energy needs:   Current body wt used for energy calculations as pt falls within % IBW   Needs adjusted for age based on hypermetabolic state 2/2 viral infection  2225kcal/day (25kcal/day)  107g protein/day (1.2g pro/kg)  Fluids per team     81 yo M h/o BPH, HLD first had fever on 3/31 started plaquenil and azithro then had 02 sat in low 80s and came to ED then intubated d/t Acute respiratory failure with hypoxia/COPD, sepsis. Found to have new Afib with RVR. Pt extubated 4/5, s/p extubation 4/3 to NRB and stable for stepdown to COVID-telemetry, currently on 5UR. Need for proning noted. Pt remains on NRB at this time.   Pt passed bedside dys screen 4/3 4/5, current diet mech soft + Glucerna 3 x3/day as ONS. Unable to conduct a face to face interview or nutrition-focused physical exam due to limited contact restrictions related to their medical condition and isolation precautions - Spoke with RN. RN reports pt with very limited PO intake today d/t sleeping for most of the day. RN reports pt set up to have dinner at this time. Per RN pt is able to come off NRB for some time for meals however will desat quickly and need to go back on. RN Denies issues chewing/swallowing at this time.   Please see below for full nutritional recommendations. RD to monitor and f/u per protocol. Paged Team for Recs, Pending order placed.     Prior PES: Inadequate Energy Intake Etiology RT inability to meet EER on NPO status Signs/Symptoms AEB pt meeting 0%EER.   RESOLVED - pt no longer NPO  Prior PES: Inadequate Oral Intake RT decreased ability for meals d/t current medical conditions AEB poor PO  On going today    Goal/Expected Outcome Meet >75%EER via most appropriate route with good tolerance within 24-48h.    Recommendations:  1. Monitor need for NPO should PO diet not be safe/feasible with use of NRB.  >> Should PO diet be feasible, recommend use of mech soft to provide ease at meals / use of oral nutrition supplements to increase chances of meeting EER - consider Glucerna x3/day (preDM A1c noted 6.3%).  2. Should PO diet not be feasible / Should pt be unable to meet EER via PO alone d/t insuffiencey time for meals 2/2 resp therapy, As medically feasible Pending resp therapy consider use of TF.   >> Should PO intake remain diminished pt to benefit from supplemental TF given Hypermetabolic state. Please reconsult for Recs PRN.   3. MVI daily, However d/t COVID - Micro-nutrients/Vitamins/Minerals per team.   4. Monitor Skin, Labs, Wts, GOC.  5. RD to remain available for additional nutrition interventions as needed.     Education: NA  Risk Level: High [   ] Moderate [ X ] Low [   ].

## 2020-04-13 NOTE — PROGRESS NOTE ADULT - ASSESSMENT
81 yo M h/o BPH, HLD first had fever on 3/31 started plaquenil and azithro then had 02 sat in low 80s and came to ED then intubated. Found to have new Afib with RVR.  s/p extubation on 4/3. s/p azithromycin, Plaquenil, and tocilizumab. On steroids.

## 2020-04-14 LAB
ALBUMIN SERPL ELPH-MCNC: 2.6 G/DL — LOW (ref 3.3–5)
ALP SERPL-CCNC: 63 U/L — SIGNIFICANT CHANGE UP (ref 40–120)
ALT FLD-CCNC: 60 U/L — HIGH (ref 10–45)
ANION GAP SERPL CALC-SCNC: 7 MMOL/L — SIGNIFICANT CHANGE UP (ref 5–17)
AST SERPL-CCNC: 32 U/L — SIGNIFICANT CHANGE UP (ref 10–40)
BASOPHILS # BLD AUTO: 0.01 K/UL — SIGNIFICANT CHANGE UP (ref 0–0.2)
BASOPHILS NFR BLD AUTO: 0.2 % — SIGNIFICANT CHANGE UP (ref 0–2)
BILIRUB SERPL-MCNC: 0.8 MG/DL — SIGNIFICANT CHANGE UP (ref 0.2–1.2)
BUN SERPL-MCNC: 22 MG/DL — SIGNIFICANT CHANGE UP (ref 7–23)
CALCIUM SERPL-MCNC: 8.4 MG/DL — SIGNIFICANT CHANGE UP (ref 8.4–10.5)
CHLORIDE SERPL-SCNC: 106 MMOL/L — SIGNIFICANT CHANGE UP (ref 96–108)
CO2 SERPL-SCNC: 24 MMOL/L — SIGNIFICANT CHANGE UP (ref 22–31)
CREAT SERPL-MCNC: 0.61 MG/DL — SIGNIFICANT CHANGE UP (ref 0.5–1.3)
CRP SERPL-MCNC: 0.35 MG/DL — SIGNIFICANT CHANGE UP (ref 0–0.4)
D DIMER BLD IA.RAPID-MCNC: 242 NG/ML DDU — HIGH
EOSINOPHIL # BLD AUTO: 0.08 K/UL — SIGNIFICANT CHANGE UP (ref 0–0.5)
EOSINOPHIL NFR BLD AUTO: 1.5 % — SIGNIFICANT CHANGE UP (ref 0–6)
FERRITIN SERPL-MCNC: 734 NG/ML — HIGH (ref 30–400)
GLUCOSE SERPL-MCNC: 102 MG/DL — HIGH (ref 70–99)
HCT VFR BLD CALC: 42.8 % — SIGNIFICANT CHANGE UP (ref 39–50)
HGB BLD-MCNC: 14.1 G/DL — SIGNIFICANT CHANGE UP (ref 13–17)
IMM GRANULOCYTES NFR BLD AUTO: 0.6 % — SIGNIFICANT CHANGE UP (ref 0–1.5)
LYMPHOCYTES # BLD AUTO: 0.47 K/UL — LOW (ref 1–3.3)
LYMPHOCYTES # BLD AUTO: 8.8 % — LOW (ref 13–44)
MAGNESIUM SERPL-MCNC: 1.9 MG/DL — SIGNIFICANT CHANGE UP (ref 1.6–2.6)
MCHC RBC-ENTMCNC: 29.3 PG — SIGNIFICANT CHANGE UP (ref 27–34)
MCHC RBC-ENTMCNC: 32.9 GM/DL — SIGNIFICANT CHANGE UP (ref 32–36)
MCV RBC AUTO: 88.8 FL — SIGNIFICANT CHANGE UP (ref 80–100)
MONOCYTES # BLD AUTO: 0.6 K/UL — SIGNIFICANT CHANGE UP (ref 0–0.9)
MONOCYTES NFR BLD AUTO: 11.2 % — SIGNIFICANT CHANGE UP (ref 2–14)
NEUTROPHILS # BLD AUTO: 4.18 K/UL — SIGNIFICANT CHANGE UP (ref 1.8–7.4)
NEUTROPHILS NFR BLD AUTO: 77.7 % — HIGH (ref 43–77)
NRBC # BLD: 0 /100 WBCS — SIGNIFICANT CHANGE UP (ref 0–0)
PHOSPHATE SERPL-MCNC: 2.9 MG/DL — SIGNIFICANT CHANGE UP (ref 2.5–4.5)
PLATELET # BLD AUTO: 132 K/UL — LOW (ref 150–400)
POTASSIUM SERPL-MCNC: 4.1 MMOL/L — SIGNIFICANT CHANGE UP (ref 3.5–5.3)
POTASSIUM SERPL-SCNC: 4.1 MMOL/L — SIGNIFICANT CHANGE UP (ref 3.5–5.3)
PROT SERPL-MCNC: 6.2 G/DL — SIGNIFICANT CHANGE UP (ref 6–8.3)
RBC # BLD: 4.82 M/UL — SIGNIFICANT CHANGE UP (ref 4.2–5.8)
RBC # FLD: 14.2 % — SIGNIFICANT CHANGE UP (ref 10.3–14.5)
SODIUM SERPL-SCNC: 137 MMOL/L — SIGNIFICANT CHANGE UP (ref 135–145)
WBC # BLD: 5.37 K/UL — SIGNIFICANT CHANGE UP (ref 3.8–10.5)
WBC # FLD AUTO: 5.37 K/UL — SIGNIFICANT CHANGE UP (ref 3.8–10.5)

## 2020-04-14 PROCEDURE — 99232 SBSQ HOSP IP/OBS MODERATE 35: CPT | Mod: CS

## 2020-04-14 RX ORDER — MAGNESIUM SULFATE 500 MG/ML
1 VIAL (ML) INJECTION ONCE
Refills: 0 | Status: COMPLETED | OUTPATIENT
Start: 2020-04-14 | End: 2020-04-14

## 2020-04-14 RX ORDER — MIDODRINE HYDROCHLORIDE 2.5 MG/1
2.5 TABLET ORAL
Refills: 0 | Status: DISCONTINUED | OUTPATIENT
Start: 2020-04-14 | End: 2020-04-14

## 2020-04-14 RX ORDER — MIDODRINE HYDROCHLORIDE 2.5 MG/1
2.5 TABLET ORAL EVERY 8 HOURS
Refills: 0 | Status: DISCONTINUED | OUTPATIENT
Start: 2020-04-14 | End: 2020-04-20

## 2020-04-14 RX ADMIN — TAMSULOSIN HYDROCHLORIDE 0.4 MILLIGRAM(S): 0.4 CAPSULE ORAL at 21:00

## 2020-04-14 RX ADMIN — MIDODRINE HYDROCHLORIDE 2.5 MILLIGRAM(S): 2.5 TABLET ORAL at 21:00

## 2020-04-14 RX ADMIN — MIDODRINE HYDROCHLORIDE 2.5 MILLIGRAM(S): 2.5 TABLET ORAL at 06:26

## 2020-04-14 RX ADMIN — ENOXAPARIN SODIUM 80 MILLIGRAM(S): 100 INJECTION SUBCUTANEOUS at 06:24

## 2020-04-14 RX ADMIN — FAMOTIDINE 20 MILLIGRAM(S): 10 INJECTION INTRAVENOUS at 06:25

## 2020-04-14 RX ADMIN — Medication 100 GRAM(S): at 17:11

## 2020-04-14 RX ADMIN — ENOXAPARIN SODIUM 80 MILLIGRAM(S): 100 INJECTION SUBCUTANEOUS at 16:00

## 2020-04-14 RX ADMIN — MIDODRINE HYDROCHLORIDE 2.5 MILLIGRAM(S): 2.5 TABLET ORAL at 12:02

## 2020-04-14 RX ADMIN — Medication 25 MILLIGRAM(S): at 06:25

## 2020-04-14 RX ADMIN — FAMOTIDINE 20 MILLIGRAM(S): 10 INJECTION INTRAVENOUS at 16:00

## 2020-04-14 NOTE — PROGRESS NOTE ADULT - SUBJECTIVE AND OBJECTIVE BOX
OVERNIGHT EVENTS: Hypothermic to 96.7 (axillary) - resolved without intervention, 94% NRB.     SUBJECTIVE / INTERVAL HPI: Patient seen and examined at bedside. He appears comfortable wearing his NRB. He dips into the high 80's. When prompted to self prone he continued to dip to mid-80's but ultimately stabilized. Returned to his back.    VITAL SIGNS:  Vital Signs Last 24 Hrs  T(C): 37.1 (14 Apr 2020 09:18), Max: 37.1 (14 Apr 2020 09:18)  T(F): 98.8 (14 Apr 2020 09:18), Max: 98.8 (14 Apr 2020 09:18)  HR: 58 (14 Apr 2020 09:18) (56 - 70)  BP: 93/51 (14 Apr 2020 09:18) (92/57 - 120/55)  BP(mean): 67 (14 Apr 2020 09:18) (67 - 73)  RR: 20 (14 Apr 2020 09:18) (20 - 20)  SpO2: 93% (14 Apr 2020 09:18) (88% - 94%)    PHYSICAL EXAM:  General: WDWN - conversing appropriately and mentating well - in NAD  Neck: supple  Cardiovascular: +S1/S2; RRR  Respiratory: course breath sounds bilaterally in anterior lung fields  Gastrointestinal: soft, NT/ND  Extremities: WWP; no edema  Vascular: 2+ radial pulses strong  Neurological: AAOx3; no focal deficits  Psych: appropriate    MEDICATIONS  (STANDING):  dextrose 5%. 1000 milliLiter(s) (50 mL/Hr) IV Continuous <Continuous>  dextrose 50% Injectable 12.5 Gram(s) IV Push once  dextrose 50% Injectable 25 Gram(s) IV Push once  dextrose 50% Injectable 25 Gram(s) IV Push once  enoxaparin Injectable 80 milliGRAM(s) SubCutaneous every 12 hours  famotidine    Tablet 20 milliGRAM(s) Oral two times a day  magnesium sulfate  IVPB 1 Gram(s) IV Intermittent once  metoprolol succinate ER 25 milliGRAM(s) Oral daily  midodrine 2.5 milliGRAM(s) Oral every 8 hours  tamsulosin 0.4 milliGRAM(s) Oral at bedtime    MEDICATIONS  (PRN):  acetaminophen    Suspension .. 650 milliGRAM(s) Enteral Tube every 6 hours PRN Temp greater or equal to 38C (100.4F), Moderate Pain (4 - 6)  dextrose 40% Gel 15 Gram(s) Oral once PRN Blood Glucose LESS THAN 70 milliGRAM(s)/deciliter  glucagon  Injectable 1 milliGRAM(s) IntraMuscular once PRN Glucose LESS THAN 70 milligrams/deciliter    ALLERGIES:  No Known Allergies or Intolerances    LABS:                        14.1   5.37  )-----------( 132      ( 14 Apr 2020 07:37 )             42.8     04-14    137  |  106  |  22  ----------------------------<  102<H>  4.1   |  24  |  0.61    Ca    8.4      14 Apr 2020 07:37  Phos  2.9     04-14  Mg     1.9     04-14    TPro  6.2  /  Alb  2.6<L>  /  TBili  0.8  /  DBili  x   /  AST  32  /  ALT  60<H>  /  AlkPhos  63  04-14    CAPILLARY BLOOD GLUCOSE  POCT Blood Glucose.: 100 mg/dL (13 Apr 2020 22:19)    RADIOLOGY & ADDITIONAL TESTS:   CT Chest No Cont (04.13.20 @ 16:10) >  IMPRESSION: Extensive bilateral peripheral and peribroncho vascular consolidation. No evidence of traction bronchiectasis. No fibrosis identified.

## 2020-04-14 NOTE — PROGRESS NOTE ADULT - PROBLEM SELECTOR PLAN 6
F- None  E-replete as needed  N- mechanical soft diet, Glucerna TID (f/u nutrition recs)  C- full code  DVt ppx: enoxaparin   GI ppx: famotidine F- None  E-replete as needed  N- mechanical soft diet  C- full code  DVt ppx: enoxaparin   GI ppx: famotidine

## 2020-04-14 NOTE — PROGRESS NOTE ADULT - PROBLEM SELECTOR PLAN 4
Rate controlled.   - Anti coagulation: enoxaparin   - Rate control: metoprolol 25 mg succinate (was on lopressor 25 mg BID prior)

## 2020-04-14 NOTE — PROGRESS NOTE ADULT - ASSESSMENT
79 yo M h/o BPH, HLD presented 4/2 with fever x 3 days. Intubated on admission, extubated on 4/4. s/p Plaquenil/azithromycin, Toci and five day steroid course (end 4/7). Course complicated by afib with RVR, now rate controlled on toprol.

## 2020-04-14 NOTE — PROGRESS NOTE ADULT - PROBLEM SELECTOR PLAN 2
S/p COVID bundle: azithromycin, hydroxychloroquine, tociluzumab 4/2, methylprednisolone 40mg IV BID.  - Tylenol PRN for fever  - Robitussin PRN for cough  - Avoid NSAIDs, ACE/ARB  - no nebulizers, MDI only  - COVID Isolation precautions: contact and droplet   - continue to monitor daily CBC w/ differential, CMP, Mg, Phos, CRP

## 2020-04-15 LAB
ALBUMIN SERPL ELPH-MCNC: 2.7 G/DL — LOW (ref 3.3–5)
ALP SERPL-CCNC: 63 U/L — SIGNIFICANT CHANGE UP (ref 40–120)
ALT FLD-CCNC: 63 U/L — HIGH (ref 10–45)
ANION GAP SERPL CALC-SCNC: 9 MMOL/L — SIGNIFICANT CHANGE UP (ref 5–17)
AST SERPL-CCNC: 36 U/L — SIGNIFICANT CHANGE UP (ref 10–40)
BASOPHILS # BLD AUTO: 0.01 K/UL — SIGNIFICANT CHANGE UP (ref 0–0.2)
BASOPHILS NFR BLD AUTO: 0.2 % — SIGNIFICANT CHANGE UP (ref 0–2)
BILIRUB SERPL-MCNC: 0.7 MG/DL — SIGNIFICANT CHANGE UP (ref 0.2–1.2)
BUN SERPL-MCNC: 18 MG/DL — SIGNIFICANT CHANGE UP (ref 7–23)
CALCIUM SERPL-MCNC: 8.3 MG/DL — LOW (ref 8.4–10.5)
CHLORIDE SERPL-SCNC: 104 MMOL/L — SIGNIFICANT CHANGE UP (ref 96–108)
CO2 SERPL-SCNC: 24 MMOL/L — SIGNIFICANT CHANGE UP (ref 22–31)
CREAT SERPL-MCNC: 0.61 MG/DL — SIGNIFICANT CHANGE UP (ref 0.5–1.3)
CRP SERPL-MCNC: 0.33 MG/DL — SIGNIFICANT CHANGE UP (ref 0–0.4)
D DIMER BLD IA.RAPID-MCNC: 286 NG/ML DDU — HIGH
EOSINOPHIL # BLD AUTO: 0.08 K/UL — SIGNIFICANT CHANGE UP (ref 0–0.5)
EOSINOPHIL NFR BLD AUTO: 1.6 % — SIGNIFICANT CHANGE UP (ref 0–6)
FERRITIN SERPL-MCNC: 604 NG/ML — HIGH (ref 30–400)
GLUCOSE SERPL-MCNC: 89 MG/DL — SIGNIFICANT CHANGE UP (ref 70–99)
HCT VFR BLD CALC: 41 % — SIGNIFICANT CHANGE UP (ref 39–50)
HGB BLD-MCNC: 13.6 G/DL — SIGNIFICANT CHANGE UP (ref 13–17)
IMM GRANULOCYTES NFR BLD AUTO: 0.4 % — SIGNIFICANT CHANGE UP (ref 0–1.5)
LYMPHOCYTES # BLD AUTO: 0.6 K/UL — LOW (ref 1–3.3)
LYMPHOCYTES # BLD AUTO: 11.8 % — LOW (ref 13–44)
MAGNESIUM SERPL-MCNC: 1.9 MG/DL — SIGNIFICANT CHANGE UP (ref 1.6–2.6)
MCHC RBC-ENTMCNC: 28.9 PG — SIGNIFICANT CHANGE UP (ref 27–34)
MCHC RBC-ENTMCNC: 33.2 GM/DL — SIGNIFICANT CHANGE UP (ref 32–36)
MCV RBC AUTO: 87.2 FL — SIGNIFICANT CHANGE UP (ref 80–100)
MONOCYTES # BLD AUTO: 0.69 K/UL — SIGNIFICANT CHANGE UP (ref 0–0.9)
MONOCYTES NFR BLD AUTO: 13.5 % — SIGNIFICANT CHANGE UP (ref 2–14)
NEUTROPHILS # BLD AUTO: 3.7 K/UL — SIGNIFICANT CHANGE UP (ref 1.8–7.4)
NEUTROPHILS NFR BLD AUTO: 72.5 % — SIGNIFICANT CHANGE UP (ref 43–77)
NRBC # BLD: 0 /100 WBCS — SIGNIFICANT CHANGE UP (ref 0–0)
PHOSPHATE SERPL-MCNC: 2.6 MG/DL — SIGNIFICANT CHANGE UP (ref 2.5–4.5)
PLATELET # BLD AUTO: 124 K/UL — LOW (ref 150–400)
POTASSIUM SERPL-MCNC: 4 MMOL/L — SIGNIFICANT CHANGE UP (ref 3.5–5.3)
POTASSIUM SERPL-SCNC: 4 MMOL/L — SIGNIFICANT CHANGE UP (ref 3.5–5.3)
PROT SERPL-MCNC: 6.1 G/DL — SIGNIFICANT CHANGE UP (ref 6–8.3)
RBC # BLD: 4.7 M/UL — SIGNIFICANT CHANGE UP (ref 4.2–5.8)
RBC # FLD: 14.2 % — SIGNIFICANT CHANGE UP (ref 10.3–14.5)
SODIUM SERPL-SCNC: 137 MMOL/L — SIGNIFICANT CHANGE UP (ref 135–145)
WBC # BLD: 5.1 K/UL — SIGNIFICANT CHANGE UP (ref 3.8–10.5)
WBC # FLD AUTO: 5.1 K/UL — SIGNIFICANT CHANGE UP (ref 3.8–10.5)

## 2020-04-15 PROCEDURE — 99232 SBSQ HOSP IP/OBS MODERATE 35: CPT | Mod: CS

## 2020-04-15 RX ORDER — MAGNESIUM SULFATE 500 MG/ML
1 VIAL (ML) INJECTION ONCE
Refills: 0 | Status: COMPLETED | OUTPATIENT
Start: 2020-04-15 | End: 2020-04-15

## 2020-04-15 RX ADMIN — ENOXAPARIN SODIUM 80 MILLIGRAM(S): 100 INJECTION SUBCUTANEOUS at 07:31

## 2020-04-15 RX ADMIN — Medication 100 GRAM(S): at 08:34

## 2020-04-15 RX ADMIN — MIDODRINE HYDROCHLORIDE 2.5 MILLIGRAM(S): 2.5 TABLET ORAL at 13:50

## 2020-04-15 RX ADMIN — ENOXAPARIN SODIUM 80 MILLIGRAM(S): 100 INJECTION SUBCUTANEOUS at 16:03

## 2020-04-15 RX ADMIN — TAMSULOSIN HYDROCHLORIDE 0.4 MILLIGRAM(S): 0.4 CAPSULE ORAL at 22:13

## 2020-04-15 RX ADMIN — FAMOTIDINE 20 MILLIGRAM(S): 10 INJECTION INTRAVENOUS at 05:36

## 2020-04-15 RX ADMIN — Medication 25 MILLIGRAM(S): at 05:37

## 2020-04-15 RX ADMIN — FAMOTIDINE 20 MILLIGRAM(S): 10 INJECTION INTRAVENOUS at 16:03

## 2020-04-15 RX ADMIN — MIDODRINE HYDROCHLORIDE 2.5 MILLIGRAM(S): 2.5 TABLET ORAL at 22:13

## 2020-04-15 RX ADMIN — MIDODRINE HYDROCHLORIDE 2.5 MILLIGRAM(S): 2.5 TABLET ORAL at 05:36

## 2020-04-15 NOTE — PROGRESS NOTE ADULT - SUBJECTIVE AND OBJECTIVE BOX
Brief history: 80M HTN, HLD, BPH, pAFIB presented on 3/31 c fever and hypoxemia. Intubated on 4/1, extubated on 4/4 to NRB. Completed Azithro/plaq, steroids and toci.     OVERNIGHT EVENTS: No acute events overnight    SUBJECTIVE / INTERVAL HPI: Patient seen and examined at bedside, feels frustrated with lack of improvement, and frightened by situation. Denies CP, SOB.     Review of systems negative except as noted above.     VITAL SIGNS:  Vital Signs Last 24 Hrs  T(C): 35.9 (15 Apr 2020 08:45), Max: 37.4 (14 Apr 2020 16:38)  T(F): 96.7 (15 Apr 2020 08:45), Max: 99.4 (14 Apr 2020 16:38)  HR: 62 (15 Apr 2020 08:45) (58 - 64)  BP: 103/56 (15 Apr 2020 08:45) (97/54 - 108/57)  BP(mean): 75 (14 Apr 2020 16:38) (69 - 75)  RR: 25 (15 Apr 2020 08:45) (20 - 28)  SpO2: 93% (15 Apr 2020 08:45) (90% - 93%)      04-14-20 @ 07:01  -  04-15-20 @ 07:00  --------------------------------------------------------  IN: 360 mL / OUT: 700 mL / NET: -340 mL        PHYSICAL EXAM:    General: WDWN, NAD, no accessory muscle use on NRB.    HEENT: -JVD  Cardiovascular: +S1/S2; Rate controlled.   Respiratory: Coarse breath sounds bilaterally.   Gastrointestinal: NTND BS+4  Extremities: WWP; no edema  Vascular: 2+ radial, DP pulses B/L  Neurological: AAOx3; no focal deficits    MEDICATIONS:  MEDICATIONS  (STANDING):  dextrose 5%. 1000 milliLiter(s) (50 mL/Hr) IV Continuous <Continuous>  dextrose 50% Injectable 12.5 Gram(s) IV Push once  dextrose 50% Injectable 25 Gram(s) IV Push once  dextrose 50% Injectable 25 Gram(s) IV Push once  enoxaparin Injectable 80 milliGRAM(s) SubCutaneous every 12 hours  famotidine    Tablet 20 milliGRAM(s) Oral two times a day  metoprolol succinate ER 25 milliGRAM(s) Oral daily  midodrine 2.5 milliGRAM(s) Oral every 8 hours  tamsulosin 0.4 milliGRAM(s) Oral at bedtime    MEDICATIONS  (PRN):  acetaminophen    Suspension .. 650 milliGRAM(s) Enteral Tube every 6 hours PRN Temp greater or equal to 38C (100.4F), Moderate Pain (4 - 6)  dextrose 40% Gel 15 Gram(s) Oral once PRN Blood Glucose LESS THAN 70 milliGRAM(s)/deciliter  glucagon  Injectable 1 milliGRAM(s) IntraMuscular once PRN Glucose LESS THAN 70 milligrams/deciliter      ALLERGIES:  Allergies    No Known Allergies    Intolerances        LABS:                        13.6   5.10  )-----------( 124      ( 15 Apr 2020 06:56 )             41.0     04-15    137  |  104  |  18  ----------------------------<  89  4.0   |  24  |  0.61    Ca    8.3<L>      15 Apr 2020 06:56  Phos  2.6     04-15  Mg     1.9     04-15    TPro  6.1  /  Alb  2.7<L>  /  TBili  0.7  /  DBili  x   /  AST  36  /  ALT  63<H>  /  AlkPhos  63  04-15        CAPILLARY BLOOD GLUCOSE      POCT Blood Glucose.: 100 mg/dL (13 Apr 2020 22:19)          RADIOLOGY & ADDITIONAL TESTS: Reviewed.

## 2020-04-15 NOTE — PROGRESS NOTE ADULT - PROBLEM SELECTOR PLAN 1
Acute hypoxic RF 2/2 COVID-19 infection. Intubated on arrival. Extubated 4/4. S/p azithromycin/plaquenil, steroid course, toci. Now requiring NRB to maintain O2 sats.    - wean off oxygen; patient continues to be on NRB with SpO2 high 80s to low 90s  - Proning as needed if patient desats   - diurese as needed PRN

## 2020-04-15 NOTE — PROGRESS NOTE ADULT - PROBLEM SELECTOR PLAN 6
F- None  E-replete as needed  N- mechanical soft diet  C- full code  DVt ppx: enoxaparin   GI ppx: famotidine

## 2020-04-15 NOTE — PROGRESS NOTE ADULT - PROBLEM SELECTOR PLAN 2
S/p COVID bundle: azithromycin, hydroxychloroquine, tociluzumab 4/2, methylprednisolone 40mg IV BID. Ferritin downtrending, other inflammatory markers unchanged.   - Tylenol PRN for fever  - Robitussin PRN for cough  - no nebulizers, MDI only  - COVID Isolation precautions: contact and droplet   - continue to monitor daily CBC w/ differential, CMP, Mg, Phos, CRP

## 2020-04-16 DIAGNOSIS — D69.6 THROMBOCYTOPENIA, UNSPECIFIED: ICD-10-CM

## 2020-04-16 LAB
ALBUMIN SERPL ELPH-MCNC: 2.7 G/DL — LOW (ref 3.3–5)
ALP SERPL-CCNC: 63 U/L — SIGNIFICANT CHANGE UP (ref 40–120)
ALT FLD-CCNC: 57 U/L — HIGH (ref 10–45)
ANION GAP SERPL CALC-SCNC: 10 MMOL/L — SIGNIFICANT CHANGE UP (ref 5–17)
AST SERPL-CCNC: 32 U/L — SIGNIFICANT CHANGE UP (ref 10–40)
BASOPHILS # BLD AUTO: 0.01 K/UL — SIGNIFICANT CHANGE UP (ref 0–0.2)
BASOPHILS NFR BLD AUTO: 0.2 % — SIGNIFICANT CHANGE UP (ref 0–2)
BILIRUB SERPL-MCNC: 0.6 MG/DL — SIGNIFICANT CHANGE UP (ref 0.2–1.2)
BUN SERPL-MCNC: 15 MG/DL — SIGNIFICANT CHANGE UP (ref 7–23)
CALCIUM SERPL-MCNC: 8.5 MG/DL — SIGNIFICANT CHANGE UP (ref 8.4–10.5)
CHLORIDE SERPL-SCNC: 105 MMOL/L — SIGNIFICANT CHANGE UP (ref 96–108)
CO2 SERPL-SCNC: 24 MMOL/L — SIGNIFICANT CHANGE UP (ref 22–31)
CREAT SERPL-MCNC: 0.61 MG/DL — SIGNIFICANT CHANGE UP (ref 0.5–1.3)
CRP SERPL-MCNC: 0.27 MG/DL — SIGNIFICANT CHANGE UP (ref 0–0.4)
D DIMER BLD IA.RAPID-MCNC: 170 NG/ML DDU — SIGNIFICANT CHANGE UP
EOSINOPHIL # BLD AUTO: 0.09 K/UL — SIGNIFICANT CHANGE UP (ref 0–0.5)
EOSINOPHIL NFR BLD AUTO: 1.7 % — SIGNIFICANT CHANGE UP (ref 0–6)
FERRITIN SERPL-MCNC: 512 NG/ML — HIGH (ref 30–400)
GLUCOSE SERPL-MCNC: 84 MG/DL — SIGNIFICANT CHANGE UP (ref 70–99)
HCT VFR BLD CALC: 41.3 % — SIGNIFICANT CHANGE UP (ref 39–50)
HGB BLD-MCNC: 13.5 G/DL — SIGNIFICANT CHANGE UP (ref 13–17)
IMM GRANULOCYTES NFR BLD AUTO: 0.6 % — SIGNIFICANT CHANGE UP (ref 0–1.5)
LYMPHOCYTES # BLD AUTO: 0.64 K/UL — LOW (ref 1–3.3)
LYMPHOCYTES # BLD AUTO: 12.4 % — LOW (ref 13–44)
MAGNESIUM SERPL-MCNC: 2 MG/DL — SIGNIFICANT CHANGE UP (ref 1.6–2.6)
MCHC RBC-ENTMCNC: 28.8 PG — SIGNIFICANT CHANGE UP (ref 27–34)
MCHC RBC-ENTMCNC: 32.7 GM/DL — SIGNIFICANT CHANGE UP (ref 32–36)
MCV RBC AUTO: 88.2 FL — SIGNIFICANT CHANGE UP (ref 80–100)
MONOCYTES # BLD AUTO: 0.72 K/UL — SIGNIFICANT CHANGE UP (ref 0–0.9)
MONOCYTES NFR BLD AUTO: 13.9 % — SIGNIFICANT CHANGE UP (ref 2–14)
NEUTROPHILS # BLD AUTO: 3.68 K/UL — SIGNIFICANT CHANGE UP (ref 1.8–7.4)
NEUTROPHILS NFR BLD AUTO: 71.2 % — SIGNIFICANT CHANGE UP (ref 43–77)
NRBC # BLD: 0 /100 WBCS — SIGNIFICANT CHANGE UP (ref 0–0)
PLATELET # BLD AUTO: 121 K/UL — LOW (ref 150–400)
POTASSIUM SERPL-MCNC: 4.2 MMOL/L — SIGNIFICANT CHANGE UP (ref 3.5–5.3)
POTASSIUM SERPL-SCNC: 4.2 MMOL/L — SIGNIFICANT CHANGE UP (ref 3.5–5.3)
PROT SERPL-MCNC: 6.3 G/DL — SIGNIFICANT CHANGE UP (ref 6–8.3)
RBC # BLD: 4.68 M/UL — SIGNIFICANT CHANGE UP (ref 4.2–5.8)
RBC # FLD: 14.4 % — SIGNIFICANT CHANGE UP (ref 10.3–14.5)
SODIUM SERPL-SCNC: 139 MMOL/L — SIGNIFICANT CHANGE UP (ref 135–145)
WBC # BLD: 5.17 K/UL — SIGNIFICANT CHANGE UP (ref 3.8–10.5)
WBC # FLD AUTO: 5.17 K/UL — SIGNIFICANT CHANGE UP (ref 3.8–10.5)

## 2020-04-16 PROCEDURE — 99232 SBSQ HOSP IP/OBS MODERATE 35: CPT | Mod: CS

## 2020-04-16 RX ORDER — ALBUTEROL 90 UG/1
2 AEROSOL, METERED ORAL EVERY 6 HOURS
Refills: 0 | Status: DISCONTINUED | OUTPATIENT
Start: 2020-04-16 | End: 2020-04-24

## 2020-04-16 RX ORDER — BENZOCAINE AND MENTHOL 5; 1 G/100ML; G/100ML
1 LIQUID ORAL ONCE
Refills: 0 | Status: DISCONTINUED | OUTPATIENT
Start: 2020-04-16 | End: 2020-04-16

## 2020-04-16 RX ORDER — BENZOCAINE AND MENTHOL 5; 1 G/100ML; G/100ML
1 LIQUID ORAL THREE TIMES A DAY
Refills: 0 | Status: DISCONTINUED | OUTPATIENT
Start: 2020-04-16 | End: 2020-04-24

## 2020-04-16 RX ADMIN — ENOXAPARIN SODIUM 80 MILLIGRAM(S): 100 INJECTION SUBCUTANEOUS at 16:20

## 2020-04-16 RX ADMIN — MIDODRINE HYDROCHLORIDE 2.5 MILLIGRAM(S): 2.5 TABLET ORAL at 05:40

## 2020-04-16 RX ADMIN — TAMSULOSIN HYDROCHLORIDE 0.4 MILLIGRAM(S): 0.4 CAPSULE ORAL at 22:43

## 2020-04-16 RX ADMIN — ENOXAPARIN SODIUM 80 MILLIGRAM(S): 100 INJECTION SUBCUTANEOUS at 05:41

## 2020-04-16 RX ADMIN — BENZOCAINE AND MENTHOL 1 LOZENGE: 5; 1 LIQUID ORAL at 22:52

## 2020-04-16 RX ADMIN — Medication 25 MILLIGRAM(S): at 05:41

## 2020-04-16 RX ADMIN — MIDODRINE HYDROCHLORIDE 2.5 MILLIGRAM(S): 2.5 TABLET ORAL at 12:01

## 2020-04-16 RX ADMIN — FAMOTIDINE 20 MILLIGRAM(S): 10 INJECTION INTRAVENOUS at 16:20

## 2020-04-16 RX ADMIN — FAMOTIDINE 20 MILLIGRAM(S): 10 INJECTION INTRAVENOUS at 05:41

## 2020-04-16 RX ADMIN — MIDODRINE HYDROCHLORIDE 2.5 MILLIGRAM(S): 2.5 TABLET ORAL at 22:43

## 2020-04-16 NOTE — PROGRESS NOTE ADULT - ASSESSMENT
81 yo M h/o BPH, HLD presented 4/2 with fever x 3 days. Intubated on admission, extubated on 4/4. s/p Plaquenil/azithromycin, Toci and five day steroid course (end 4/7). Course complicated by afib with RVR, now rate controlled on toprol.

## 2020-04-16 NOTE — PROGRESS NOTE ADULT - PROBLEM SELECTOR PLAN 1
Acute hypoxic RF 2/2 COVID-19 infection. Intubated on arrival. Extubated 4/4. S/p azithromycin/plaquenil, steroid course, toci. Transitioned to venuri mask with adequate O2 saturations.  - wean O2  - Proning as needed if patient desats   - diurese as needed PRN

## 2020-04-16 NOTE — PROGRESS NOTE ADULT - PROBLEM SELECTOR PLAN 3
BPs with systolic low 100s currently on Midodrine 2.5 mg TID.   - wean midodrine 2.5 mg TID  slowly downtrending since 4/14. Likely 2/2 to covid, no evidence of mucosal bleeding or bruising  - continue to trend CBCs

## 2020-04-16 NOTE — PROGRESS NOTE ADULT - PROBLEM SELECTOR PLAN 6
F- None  E-replete as needed  N- mechanical soft diet  C- full code  DVt ppx: enoxaparin   GI ppx: famotidine - monitor urinary output;  - c/w home tamsulosin 0.4 mg

## 2020-04-16 NOTE — PROGRESS NOTE ADULT - PROBLEM SELECTOR PLAN 2
S/p COVID bundle: azithromycin, hydroxychloroquine, tociluzumab 4/2, methylprednisolone 40mg IV BID. Inflammatory markers downtrending  - Tylenol PRN for fever  - Robitussin PRN for cough  - no nebulizers, MDI only  - COVID Isolation precautions: contact and droplet   - continue to monitor daily CBC w/ differential, CMP, Mg, Phos, CRP

## 2020-04-16 NOTE — PROGRESS NOTE ADULT - PROBLEM SELECTOR PLAN 4
Rate controlled.   - Anti coagulation: enoxaparin   - Rate control: TOPROL XL 25mg BPs with systolic low 100s currently on Midodrine 2.5 mg TID.   - wean midodrine 2.5 mg TID

## 2020-04-16 NOTE — PROGRESS NOTE ADULT - PROBLEM SELECTOR PROBLEM 6
Nutrition, metabolism, and development symptoms Benign prostatic hyperplasia without lower urinary tract symptoms

## 2020-04-16 NOTE — PROGRESS NOTE ADULT - SUBJECTIVE AND OBJECTIVE BOX
OVERNIGHT EVENTS: No acute events overnight    SUBJECTIVE / INTERVAL HPI: Patient seen and examined at bedside. Reports feeling well and less anxious. Endorses improvement in SOB, denies CP, LE pain. f/c.     Review of systems negative except as noted above.     VITAL SIGNS:  Vital Signs Last 24 Hrs  T(C): 36.3 (16 Apr 2020 06:00), Max: 36.3 (16 Apr 2020 06:00)  T(F): 97.3 (16 Apr 2020 06:00), Max: 97.3 (16 Apr 2020 06:00)  HR: 65 (16 Apr 2020 08:30) (58 - 78)  BP: 105/55 (16 Apr 2020 08:30) (94/52 - 113/64)  BP(mean): --  RR: 20 (16 Apr 2020 08:30) (20 - 80)  SpO2: 91% (16 Apr 2020 08:30) (89% - 92%)      04-15-20 @ 07:01  -  04-16-20 @ 07:00  --------------------------------------------------------  IN: 360 mL / OUT: 800 mL / NET: -440 mL        PHYSICAL EXAM:    General: WDWN, NAD, resting comfortably on venturi mask with no accessory muscle use.  HEENT: MMM  Neck: -JVD  Cardiovascular: +S1/S2; RRR  Respiratory: coarse rhoncourus breath sounds.   Gastrointestinal: NTND BS+4  Extremities: WWP; no edema, symmetrical   Vascular: 2+ radial, DP pulses B/L  Neurological: AAOx3; no focal deficits    MEDICATIONS:  MEDICATIONS  (STANDING):  dextrose 5%. 1000 milliLiter(s) (50 mL/Hr) IV Continuous <Continuous>  dextrose 50% Injectable 12.5 Gram(s) IV Push once  dextrose 50% Injectable 25 Gram(s) IV Push once  dextrose 50% Injectable 25 Gram(s) IV Push once  enoxaparin Injectable 80 milliGRAM(s) SubCutaneous every 12 hours  famotidine    Tablet 20 milliGRAM(s) Oral two times a day  metoprolol succinate ER 25 milliGRAM(s) Oral daily  midodrine 2.5 milliGRAM(s) Oral every 8 hours  tamsulosin 0.4 milliGRAM(s) Oral at bedtime    MEDICATIONS  (PRN):  acetaminophen    Suspension .. 650 milliGRAM(s) Enteral Tube every 6 hours PRN Temp greater or equal to 38C (100.4F), Moderate Pain (4 - 6)  dextrose 40% Gel 15 Gram(s) Oral once PRN Blood Glucose LESS THAN 70 milliGRAM(s)/deciliter  glucagon  Injectable 1 milliGRAM(s) IntraMuscular once PRN Glucose LESS THAN 70 milligrams/deciliter      ALLERGIES:  Allergies    No Known Allergies    Intolerances        LABS:                        13.5   5.17  )-----------( 121      ( 16 Apr 2020 08:24 )             41.3     04-16    139  |  105  |  15  ----------------------------<  84  4.2   |  24  |  0.61    Ca    8.5      16 Apr 2020 08:24  Phos  2.6     04-15  Mg     2.0     04-16    TPro  6.3  /  Alb  2.7<L>  /  TBili  0.6  /  DBili  x   /  AST  32  /  ALT  57<H>  /  AlkPhos  63  04-16        CAPILLARY BLOOD GLUCOSE              RADIOLOGY & ADDITIONAL TESTS: Reviewed.

## 2020-04-16 NOTE — PROGRESS NOTE ADULT - PROBLEM SELECTOR PLAN 5
- monitor urinary output;  - c/w home tamsulosin 0.4 mg Rate controlled.   - Anti coagulation: enoxaparin   - Rate control: TOPROL XL 25mg

## 2020-04-17 LAB
ALBUMIN SERPL ELPH-MCNC: 2.6 G/DL — LOW (ref 3.3–5)
ALP SERPL-CCNC: 62 U/L — SIGNIFICANT CHANGE UP (ref 40–120)
ALT FLD-CCNC: 49 U/L — HIGH (ref 10–45)
ANION GAP SERPL CALC-SCNC: 8 MMOL/L — SIGNIFICANT CHANGE UP (ref 5–17)
AST SERPL-CCNC: 25 U/L — SIGNIFICANT CHANGE UP (ref 10–40)
BASOPHILS # BLD AUTO: 0.02 K/UL — SIGNIFICANT CHANGE UP (ref 0–0.2)
BASOPHILS NFR BLD AUTO: 0.4 % — SIGNIFICANT CHANGE UP (ref 0–2)
BILIRUB SERPL-MCNC: 0.5 MG/DL — SIGNIFICANT CHANGE UP (ref 0.2–1.2)
BUN SERPL-MCNC: 15 MG/DL — SIGNIFICANT CHANGE UP (ref 7–23)
CALCIUM SERPL-MCNC: 8.3 MG/DL — LOW (ref 8.4–10.5)
CHLORIDE SERPL-SCNC: 106 MMOL/L — SIGNIFICANT CHANGE UP (ref 96–108)
CO2 SERPL-SCNC: 25 MMOL/L — SIGNIFICANT CHANGE UP (ref 22–31)
CREAT SERPL-MCNC: 0.62 MG/DL — SIGNIFICANT CHANGE UP (ref 0.5–1.3)
CRP SERPL-MCNC: 0.44 MG/DL — HIGH (ref 0–0.4)
D DIMER BLD IA.RAPID-MCNC: <150 NG/ML DDU — SIGNIFICANT CHANGE UP
EOSINOPHIL # BLD AUTO: 0.09 K/UL — SIGNIFICANT CHANGE UP (ref 0–0.5)
EOSINOPHIL NFR BLD AUTO: 1.9 % — SIGNIFICANT CHANGE UP (ref 0–6)
FERRITIN SERPL-MCNC: 437 NG/ML — HIGH (ref 30–400)
GLUCOSE BLDC GLUCOMTR-MCNC: 100 MG/DL — HIGH (ref 70–99)
GLUCOSE SERPL-MCNC: 100 MG/DL — HIGH (ref 70–99)
HCT VFR BLD CALC: 40.9 % — SIGNIFICANT CHANGE UP (ref 39–50)
HGB BLD-MCNC: 13.2 G/DL — SIGNIFICANT CHANGE UP (ref 13–17)
IMM GRANULOCYTES NFR BLD AUTO: 0.2 % — SIGNIFICANT CHANGE UP (ref 0–1.5)
LYMPHOCYTES # BLD AUTO: 0.55 K/UL — LOW (ref 1–3.3)
LYMPHOCYTES # BLD AUTO: 11.7 % — LOW (ref 13–44)
MAGNESIUM SERPL-MCNC: 1.8 MG/DL — SIGNIFICANT CHANGE UP (ref 1.6–2.6)
MCHC RBC-ENTMCNC: 28.7 PG — SIGNIFICANT CHANGE UP (ref 27–34)
MCHC RBC-ENTMCNC: 32.3 GM/DL — SIGNIFICANT CHANGE UP (ref 32–36)
MCV RBC AUTO: 88.9 FL — SIGNIFICANT CHANGE UP (ref 80–100)
MONOCYTES # BLD AUTO: 0.66 K/UL — SIGNIFICANT CHANGE UP (ref 0–0.9)
MONOCYTES NFR BLD AUTO: 14 % — SIGNIFICANT CHANGE UP (ref 2–14)
NEUTROPHILS # BLD AUTO: 3.37 K/UL — SIGNIFICANT CHANGE UP (ref 1.8–7.4)
NEUTROPHILS NFR BLD AUTO: 71.8 % — SIGNIFICANT CHANGE UP (ref 43–77)
NRBC # BLD: 0 /100 WBCS — SIGNIFICANT CHANGE UP (ref 0–0)
PLATELET # BLD AUTO: 108 K/UL — LOW (ref 150–400)
POTASSIUM SERPL-MCNC: 4 MMOL/L — SIGNIFICANT CHANGE UP (ref 3.5–5.3)
POTASSIUM SERPL-SCNC: 4 MMOL/L — SIGNIFICANT CHANGE UP (ref 3.5–5.3)
PROT SERPL-MCNC: 5.9 G/DL — LOW (ref 6–8.3)
RBC # BLD: 4.6 M/UL — SIGNIFICANT CHANGE UP (ref 4.2–5.8)
RBC # FLD: 14.5 % — SIGNIFICANT CHANGE UP (ref 10.3–14.5)
SODIUM SERPL-SCNC: 139 MMOL/L — SIGNIFICANT CHANGE UP (ref 135–145)
WBC # BLD: 4.7 K/UL — SIGNIFICANT CHANGE UP (ref 3.8–10.5)
WBC # FLD AUTO: 4.7 K/UL — SIGNIFICANT CHANGE UP (ref 3.8–10.5)

## 2020-04-17 PROCEDURE — 99232 SBSQ HOSP IP/OBS MODERATE 35: CPT | Mod: CS

## 2020-04-17 RX ORDER — MAGNESIUM SULFATE 500 MG/ML
1 VIAL (ML) INJECTION ONCE
Refills: 0 | Status: COMPLETED | OUTPATIENT
Start: 2020-04-17 | End: 2020-04-17

## 2020-04-17 RX ADMIN — ENOXAPARIN SODIUM 80 MILLIGRAM(S): 100 INJECTION SUBCUTANEOUS at 05:15

## 2020-04-17 RX ADMIN — FAMOTIDINE 20 MILLIGRAM(S): 10 INJECTION INTRAVENOUS at 05:14

## 2020-04-17 RX ADMIN — ALBUTEROL 2 PUFF(S): 90 AEROSOL, METERED ORAL at 19:31

## 2020-04-17 RX ADMIN — FAMOTIDINE 20 MILLIGRAM(S): 10 INJECTION INTRAVENOUS at 17:13

## 2020-04-17 RX ADMIN — MIDODRINE HYDROCHLORIDE 2.5 MILLIGRAM(S): 2.5 TABLET ORAL at 14:02

## 2020-04-17 RX ADMIN — ALBUTEROL 2 PUFF(S): 90 AEROSOL, METERED ORAL at 06:15

## 2020-04-17 RX ADMIN — TAMSULOSIN HYDROCHLORIDE 0.4 MILLIGRAM(S): 0.4 CAPSULE ORAL at 22:04

## 2020-04-17 RX ADMIN — MIDODRINE HYDROCHLORIDE 2.5 MILLIGRAM(S): 2.5 TABLET ORAL at 22:04

## 2020-04-17 RX ADMIN — Medication 100 GRAM(S): at 10:24

## 2020-04-17 RX ADMIN — ENOXAPARIN SODIUM 80 MILLIGRAM(S): 100 INJECTION SUBCUTANEOUS at 17:13

## 2020-04-17 RX ADMIN — Medication 25 MILLIGRAM(S): at 09:02

## 2020-04-17 RX ADMIN — ALBUTEROL 2 PUFF(S): 90 AEROSOL, METERED ORAL at 14:19

## 2020-04-17 RX ADMIN — MIDODRINE HYDROCHLORIDE 2.5 MILLIGRAM(S): 2.5 TABLET ORAL at 05:14

## 2020-04-17 RX ADMIN — ALBUTEROL 2 PUFF(S): 90 AEROSOL, METERED ORAL at 01:06

## 2020-04-17 NOTE — PROVIDER CONTACT NOTE (OTHER) - BACKGROUND
BPH, HLD, HTN
Pt. admitted for COVID. Pt. has hx of a-fib.
BPH. HLD. HTN
Patient NSR/SB before. Metoprolol not given this AM for low HR (50s).
Pt. COVID positive and received from [place] with conte in place.

## 2020-04-17 NOTE — PROGRESS NOTE ADULT - PROBLEM SELECTOR PLAN 3
slowly downtrending since 4/14. Likely 2/2 to covid, no evidence of mucosal bleeding or bruising. Not on any meds causing decreased PLTs  - continue to trend CBCs  - peripheral smear  - transfuse for PLT<50k

## 2020-04-17 NOTE — PROVIDER CONTACT NOTE (OTHER) - ASSESSMENT
Pt was sleeping. Vitals WNL.
Pt. alert, easily arousble, A&Ox4, denies headache, dizziness, palpitations, and chest pain. NSR.
Triple Lumen in place on R side all three lines Flushes without difficulty, capped appropriately
Patient appears comfortable, in no distress. Denies chest pain, sob, palpitations, light headedness, dizziness.  current BP 94/51, HR 160s, respiration 22, pulse ox 94% on venturi 50%
Pt. alert and awake, c/o discomfort with conte due to urine leaking from penis, despite conte in place. Small amount of urine noted on bed.
pt RR = 22 comfortable in no distress - abdominal muscle use but denies SOB

## 2020-04-17 NOTE — PROGRESS NOTE ADULT - PROBLEM SELECTOR PLAN 4
#patient with episode of tachycardia to 150s this am. Shortly after moving OOB to chair. EKG consistent with Sinus tach, RAD with RBBB, prior to Toprol administration. Asymptomatic.  -Patient to be monitored throughout day.     Rate controlled.   - Anti coagulation: enoxaparin   - Rate control: TOPROL XL 25mg

## 2020-04-17 NOTE — CHART NOTE - NSCHARTNOTEFT_GEN_A_CORE
Admitting Diagnosis:   Patient is a 80y old  Male who presents with a chief complaint of Respiratory failure 2/2 to COVID (17 Apr 2020 06:31)      PAST MEDICAL & SURGICAL HISTORY:  BPH (benign prostatic hyperplasia)  HLD (hyperlipidemia)  BPH (benign prostatic hyperplasia)  Hypertension  No significant past surgical history      Current Nutrition Order: mechanical soft + Glucerna TID (660kcal, 30g pro)    PO Intake: Fair ~50% of meals and ONS     GI Issues: pt with no recent complaints of n/v/d/c     Pain: pt with no recent complaints of pain.     Skin Integrity: sacral stage I     Labs:   04-17    139  |  106  |  15  ----------------------------<  100<H>  4.0   |  25  |  0.62    Ca    8.3<L>      17 Apr 2020 05:51  Mg     1.8     04-17    TPro  5.9<L>  /  Alb  2.6<L>  /  TBili  0.5  /  DBili  x   /  AST  25  /  ALT  49<H>  /  AlkPhos  62  04-17      Medications:  MEDICATIONS  (STANDING):  ALBUTerol    90 MICROgram(s) HFA Inhaler 2 Puff(s) Inhalation every 6 hours  dextrose 5%. 1000 milliLiter(s) (50 mL/Hr) IV Continuous <Continuous>  dextrose 50% Injectable 12.5 Gram(s) IV Push once  dextrose 50% Injectable 25 Gram(s) IV Push once  dextrose 50% Injectable 25 Gram(s) IV Push once  enoxaparin Injectable 80 milliGRAM(s) SubCutaneous every 12 hours  famotidine    Tablet 20 milliGRAM(s) Oral two times a day  metoprolol succinate ER 25 milliGRAM(s) Oral daily  midodrine 2.5 milliGRAM(s) Oral every 8 hours  tamsulosin 0.4 milliGRAM(s) Oral at bedtime    MEDICATIONS  (PRN):  acetaminophen    Suspension .. 650 milliGRAM(s) Enteral Tube every 6 hours PRN Temp greater or equal to 38C (100.4F), Moderate Pain (4 - 6)  benzocaine 15 mG/menthol 3.6 mG (Sugar-Free) Lozenge 1 Lozenge Oral three times a day PRN cough  dextrose 40% Gel 15 Gram(s) Oral once PRN Blood Glucose LESS THAN 70 milliGRAM(s)/deciliter  glucagon  Injectable 1 milliGRAM(s) IntraMuscular once PRN Glucose LESS THAN 70 milligrams/deciliter      Weight: (4/2) 88.5kg     Weight Change: no additional weights documented for comparison     Estimated energy needs:   6'2''  pounds +-10%  4/2 195 pounds  BMI 25.1 %OHQ=838%   Based on most recent EMR wt     Current body wt used for energy calculations as pt falls within % IBW   Needs adjusted for age based on hypermetabolic state 2/2 viral infection  2225kcal/day (25kcal/day)  107g protein/day (1.2g pro/kg)  Fluids per team     Subjective:   81 yo M h/o BPH, HLD first had fever on 3/31 started plaquenil and azithro then had 02 sat in low 80s and came to ED then intubated d/t Acute respiratory failure with hypoxia/COPD, sepsis. Found to have new Afib with RVR. Pt extubated 4/5, s/p extubation 4/3 to NRB and stable for stepdown to COVID-telemetry, currently on 5UR. Need for proning noted. Pt is currently satting 91% on 50% venturi mask.    Unable to conduct a face to face interview or nutrition-focused physical exam due to limited contact restrictions related to their medical condition and isolation precautions - Spoke with RN.   Pt previously advanced to mechanical soft diet with thin liquids per SLP recommendations.  Pt remains with good tolerance.  PO intake has increased slightly; consuming ~50% of meals and ONS.  Of note, CRP has been down-trending.     Previous Nutrition Diagnosis: Inadequate Oral Intake RT decreased ability for meals d/t current medical conditions AEB poor PO    Active [   ]  Resolved [  x ]    If resolved, new PES: Increased nutrient needs (kcal/pro) r/t increased demand for nutrients AEB COVID+/infection/inflammation     Goal: Pt to consistently meet at least 75% estimated nutrient needs     Recommendations:  1. Monitor PO intake  2. Honor food preferences  3. Continue current diet order pending further recommendations from SLP  4. Continue ONS  5. Appreciate continued assistance and encouragement with meals     Education: n/a     Risk Level: High [   ] Moderate [ x  ] Low [   ]

## 2020-04-17 NOTE — PROGRESS NOTE ADULT - PROBLEM SELECTOR PLAN 1
Acute hypoxic RF 2/2 COVID-19 infection. Intubated on arrival. Extubated 4/4. S/p azithromycin/plaquenil, steroid course, toci. Transitioned to venuri mask with adequate O2 saturations now OOB.   - wean O2  - Proning as needed if patient desats   - diurese as needed PRN

## 2020-04-17 NOTE — PROVIDER CONTACT NOTE (OTHER) - RECOMMENDATIONS
Alert team.  Continue to monitor.
Evaluate.
Remove triple Lumen ASAP
Continue to monitor, educated pt to lye prone as much as possible and lay prone
Evaluate.
Give metoprolol IV, EKG

## 2020-04-17 NOTE — PROGRESS NOTE ADULT - REASON FOR ADMISSION
Respiratory failure 2/2 to COVID
COVID +ve resp failure
COVID
COVID Hypoxemia
COVID pneumonia
Hypoxia
internal medicine

## 2020-04-17 NOTE — PROVIDER CONTACT NOTE (OTHER) - SITUATION
Pt has a triple lumen from admission when Intubated , pt is table has 2 working IVs and good vascular system in need for further IVs no need for Central line poses more a risk than a benefit
Pts. HR went down to 25-30's as per Dr. Ferro, did not sustain, HR went back up to 50-60.
Conversion from SR to junctional and then immediate return to SR
Patient Sinus tachycardic with HR 150s/160s sustained. No complaints of SOB, chest pain, light headedness, dizziness.
Pt. has conte in place, c/o urine draining from penis. Small amount of urine noted on bed.
pt COVID positive on non rebreather 15L sating 93-95%, RN attempted to titrate- pt cannot tolerate titration of O2 from non rebreather 15L to 10L non rebreather or 6L N/C only sating 85%-90%

## 2020-04-17 NOTE — PROVIDER CONTACT NOTE (OTHER) - NAME OF MD/NP/PA/DO NOTIFIED:
Pts. HR went down to 25-30's as per Dr. Ferro, did not sustain, HR went back up to 50-60.
Dr. Gonzalez
Dr. STELLA Garcia
Dr. Silver
MD Stuart Garcia
Stuart Chaudhari

## 2020-04-17 NOTE — PROGRESS NOTE ADULT - PROBLEM SELECTOR PLAN 5
Currently with hypotension and BPs with systolic low 100s currently on Midodrine 2.5 mg TID.   - wean midodrine 2.5 mg TID

## 2020-04-17 NOTE — PROVIDER CONTACT NOTE (OTHER) - ACTION/TREATMENT ORDERED:
Continue to monitor.
EKG done. Dr. Ferro at bedside. As per Dr. Ferro, cardiac monitor did not  pts. actual HR. No other interventions ordered at this time. Will continue to monitor.
MD Garcia made aware
As per Dr. Silver, discontinue conte catheter, allow trial of void. Bladder scan in AM. Urinal at bedside. Will continue to monitor.
Give PO scheduled Metoprolol, EKG
MD Stuart Garcia notified and aware

## 2020-04-17 NOTE — PROGRESS NOTE ADULT - SUBJECTIVE AND OBJECTIVE BOX
Brief history: 80M HTN, HLD, BPH, pAFIB presented on 3/31 c fever and hypoxemia. Intubated on 4/1, extubated on 4/4 to NRB. Completed Azithro/plaq, steroids and toci. Brief history: 80M HTN, HLD, BPH, pAFIB presented on 3/31 c fever and hypoxemia. Intubated on 4/1, extubated on 4/4 to NRB. Completed Azithro/plaq, steroids and toci.     OVERNIGHT EVENTS: No acute events overnight. Patient received albuterol and Cepacol.     SUBJECTIVE / INTERVAL HPI: Patient seen and examined at bedside. No acute complaints, reports feeling slightly short of breath when he moved from bed to chair but able to catch his breath after sitting for a bit. Denies CP, fevers. Says he is feeling better.     Review of systems negative except as noted above.     VITAL SIGNS:  Vital Signs Last 24 Hrs  T(C): 35.8 (17 Apr 2020 05:20), Max: 36.4 (16 Apr 2020 15:02)  T(F): 96.5 (17 Apr 2020 05:20), Max: 97.5 (16 Apr 2020 15:02)  HR: 137 (17 Apr 2020 09:22) (58 - 137)  BP: 112/60 (17 Apr 2020 09:22) (100/55 - 113/71)  BP(mean): --  RR: 24 (17 Apr 2020 09:22) (20 - 25)  SpO2: 94% (17 Apr 2020 09:22) (90% - 94%)      04-16-20 @ 07:01  -  04-17-20 @ 07:00  --------------------------------------------------------  IN: 180 mL / OUT: 825 mL / NET: -645 mL        PHYSICAL EXAM:  General: WDWN, NAD sitting comfortably in chair eating breakfast on venturi mask.   HEENT: MMM  Neck: -JVD  Cardiovascular: +S1/S2; RRR  Respiratory: Coarse breath sounds in L lung base.   Gastrointestinal: NTND BS+4  Extremities: WWP; no edema  Vascular: 2+ radial, DP pulses B/L  Neurological: AAOx3; no focal deficits  Skin: No evidence of mucosal bleeding or petechiae    MEDICATIONS:  MEDICATIONS  (STANDING):  ALBUTerol    90 MICROgram(s) HFA Inhaler 2 Puff(s) Inhalation every 6 hours  dextrose 5%. 1000 milliLiter(s) (50 mL/Hr) IV Continuous <Continuous>  dextrose 50% Injectable 12.5 Gram(s) IV Push once  dextrose 50% Injectable 25 Gram(s) IV Push once  dextrose 50% Injectable 25 Gram(s) IV Push once  enoxaparin Injectable 80 milliGRAM(s) SubCutaneous every 12 hours  famotidine    Tablet 20 milliGRAM(s) Oral two times a day  magnesium sulfate  IVPB 1 Gram(s) IV Intermittent once  metoprolol succinate ER 25 milliGRAM(s) Oral daily  midodrine 2.5 milliGRAM(s) Oral every 8 hours  tamsulosin 0.4 milliGRAM(s) Oral at bedtime    MEDICATIONS  (PRN):  acetaminophen    Suspension .. 650 milliGRAM(s) Enteral Tube every 6 hours PRN Temp greater or equal to 38C (100.4F), Moderate Pain (4 - 6)  benzocaine 15 mG/menthol 3.6 mG (Sugar-Free) Lozenge 1 Lozenge Oral three times a day PRN cough  dextrose 40% Gel 15 Gram(s) Oral once PRN Blood Glucose LESS THAN 70 milliGRAM(s)/deciliter  glucagon  Injectable 1 milliGRAM(s) IntraMuscular once PRN Glucose LESS THAN 70 milligrams/deciliter      ALLERGIES:  Allergies    No Known Allergies    Intolerances        LABS:                        13.2   4.70  )-----------( 108      ( 17 Apr 2020 05:51 )             40.9     04-17    139  |  106  |  15  ----------------------------<  100<H>  4.0   |  25  |  0.62    Ca    8.3<L>      17 Apr 2020 05:51  Mg     1.8     04-17    TPro  5.9<L>  /  Alb  2.6<L>  /  TBili  0.5  /  DBili  x   /  AST  25  /  ALT  49<H>  /  AlkPhos  62  04-17        CAPILLARY BLOOD GLUCOSE              RADIOLOGY & ADDITIONAL TESTS: Reviewed.

## 2020-04-18 DIAGNOSIS — U07.1 COVID-19: ICD-10-CM

## 2020-04-18 LAB
ALBUMIN SERPL ELPH-MCNC: 2.6 G/DL — LOW (ref 3.3–5)
ALP SERPL-CCNC: 64 U/L — SIGNIFICANT CHANGE UP (ref 40–120)
ALT FLD-CCNC: 37 U/L — SIGNIFICANT CHANGE UP (ref 10–45)
ANION GAP SERPL CALC-SCNC: 10 MMOL/L — SIGNIFICANT CHANGE UP (ref 5–17)
AST SERPL-CCNC: 22 U/L — SIGNIFICANT CHANGE UP (ref 10–40)
BASOPHILS # BLD AUTO: 0.01 K/UL — SIGNIFICANT CHANGE UP (ref 0–0.2)
BASOPHILS NFR BLD AUTO: 0.2 % — SIGNIFICANT CHANGE UP (ref 0–2)
BILIRUB SERPL-MCNC: 0.7 MG/DL — SIGNIFICANT CHANGE UP (ref 0.2–1.2)
BUN SERPL-MCNC: 14 MG/DL — SIGNIFICANT CHANGE UP (ref 7–23)
CALCIUM SERPL-MCNC: 8.3 MG/DL — LOW (ref 8.4–10.5)
CHLORIDE SERPL-SCNC: 100 MMOL/L — SIGNIFICANT CHANGE UP (ref 96–108)
CO2 SERPL-SCNC: 24 MMOL/L — SIGNIFICANT CHANGE UP (ref 22–31)
CREAT SERPL-MCNC: 0.61 MG/DL — SIGNIFICANT CHANGE UP (ref 0.5–1.3)
CRP SERPL-MCNC: 3.52 MG/DL — HIGH (ref 0–0.4)
D DIMER BLD IA.RAPID-MCNC: <150 NG/ML DDU — SIGNIFICANT CHANGE UP
DAT POLY-SP REAG RBC QL: NEGATIVE — SIGNIFICANT CHANGE UP
EOSINOPHIL # BLD AUTO: 0.11 K/UL — SIGNIFICANT CHANGE UP (ref 0–0.5)
EOSINOPHIL NFR BLD AUTO: 2.3 % — SIGNIFICANT CHANGE UP (ref 0–6)
FERRITIN SERPL-MCNC: 414 NG/ML — HIGH (ref 30–400)
GLUCOSE SERPL-MCNC: 90 MG/DL — SIGNIFICANT CHANGE UP (ref 70–99)
HCT VFR BLD CALC: 36.7 % — LOW (ref 39–50)
HGB BLD-MCNC: 12.1 G/DL — LOW (ref 13–17)
IMM GRANULOCYTES NFR BLD AUTO: 0.4 % — SIGNIFICANT CHANGE UP (ref 0–1.5)
LYMPHOCYTES # BLD AUTO: 0.65 K/UL — LOW (ref 1–3.3)
LYMPHOCYTES # BLD AUTO: 13.7 % — SIGNIFICANT CHANGE UP (ref 13–44)
MAGNESIUM SERPL-MCNC: 1.8 MG/DL — SIGNIFICANT CHANGE UP (ref 1.6–2.6)
MCHC RBC-ENTMCNC: 29.2 PG — SIGNIFICANT CHANGE UP (ref 27–34)
MCHC RBC-ENTMCNC: 33 GM/DL — SIGNIFICANT CHANGE UP (ref 32–36)
MCV RBC AUTO: 88.4 FL — SIGNIFICANT CHANGE UP (ref 80–100)
MONOCYTES # BLD AUTO: 0.78 K/UL — SIGNIFICANT CHANGE UP (ref 0–0.9)
MONOCYTES NFR BLD AUTO: 16.4 % — HIGH (ref 2–14)
NEUTROPHILS # BLD AUTO: 3.19 K/UL — SIGNIFICANT CHANGE UP (ref 1.8–7.4)
NEUTROPHILS NFR BLD AUTO: 67 % — SIGNIFICANT CHANGE UP (ref 43–77)
NRBC # BLD: 0 /100 WBCS — SIGNIFICANT CHANGE UP (ref 0–0)
PLATELET # BLD AUTO: 103 K/UL — LOW (ref 150–400)
POTASSIUM SERPL-MCNC: 4.2 MMOL/L — SIGNIFICANT CHANGE UP (ref 3.5–5.3)
POTASSIUM SERPL-SCNC: 4.2 MMOL/L — SIGNIFICANT CHANGE UP (ref 3.5–5.3)
PROT SERPL-MCNC: 6.1 G/DL — SIGNIFICANT CHANGE UP (ref 6–8.3)
RBC # BLD: 4.15 M/UL — LOW (ref 4.2–5.8)
RBC # FLD: 14.6 % — HIGH (ref 10.3–14.5)
SODIUM SERPL-SCNC: 134 MMOL/L — LOW (ref 135–145)
WBC # BLD: 4.76 K/UL — SIGNIFICANT CHANGE UP (ref 3.8–10.5)
WBC # FLD AUTO: 4.76 K/UL — SIGNIFICANT CHANGE UP (ref 3.8–10.5)

## 2020-04-18 PROCEDURE — 99233 SBSQ HOSP IP/OBS HIGH 50: CPT

## 2020-04-18 RX ORDER — MAGNESIUM SULFATE 500 MG/ML
1 VIAL (ML) INJECTION ONCE
Refills: 0 | Status: COMPLETED | OUTPATIENT
Start: 2020-04-18 | End: 2020-04-18

## 2020-04-18 RX ADMIN — ENOXAPARIN SODIUM 80 MILLIGRAM(S): 100 INJECTION SUBCUTANEOUS at 18:22

## 2020-04-18 RX ADMIN — ALBUTEROL 2 PUFF(S): 90 AEROSOL, METERED ORAL at 12:29

## 2020-04-18 RX ADMIN — Medication 100 GRAM(S): at 12:29

## 2020-04-18 RX ADMIN — MIDODRINE HYDROCHLORIDE 2.5 MILLIGRAM(S): 2.5 TABLET ORAL at 20:32

## 2020-04-18 RX ADMIN — FAMOTIDINE 20 MILLIGRAM(S): 10 INJECTION INTRAVENOUS at 04:54

## 2020-04-18 RX ADMIN — FAMOTIDINE 20 MILLIGRAM(S): 10 INJECTION INTRAVENOUS at 18:22

## 2020-04-18 RX ADMIN — TAMSULOSIN HYDROCHLORIDE 0.4 MILLIGRAM(S): 0.4 CAPSULE ORAL at 20:32

## 2020-04-18 RX ADMIN — ALBUTEROL 2 PUFF(S): 90 AEROSOL, METERED ORAL at 18:25

## 2020-04-18 RX ADMIN — MIDODRINE HYDROCHLORIDE 2.5 MILLIGRAM(S): 2.5 TABLET ORAL at 04:53

## 2020-04-18 RX ADMIN — ALBUTEROL 2 PUFF(S): 90 AEROSOL, METERED ORAL at 00:38

## 2020-04-18 RX ADMIN — ENOXAPARIN SODIUM 80 MILLIGRAM(S): 100 INJECTION SUBCUTANEOUS at 05:16

## 2020-04-18 RX ADMIN — MIDODRINE HYDROCHLORIDE 2.5 MILLIGRAM(S): 2.5 TABLET ORAL at 14:21

## 2020-04-18 RX ADMIN — ALBUTEROL 2 PUFF(S): 90 AEROSOL, METERED ORAL at 04:53

## 2020-04-18 NOTE — PROGRESS NOTE ADULT - PROBLEM SELECTOR PLAN 1
Acute hypoxic RF 2/2 COVID-19 infection. Intubated on arrival. Extubated 4/4. S/p azithromycin/plaquenil, steroid course, toci. Remains on NRB.  - wean O2  - Proning as needed if patient desats   - diurese as needed PRN

## 2020-04-18 NOTE — PROGRESS NOTE ADULT - SUBJECTIVE AND OBJECTIVE BOX
OVERNIGHT EVENTS: Pt desatting on venti-mask, placed on 15L NRB    SUBJECTIVE / INTERVAL HPI: Patient seen and examined at bedside. Patient says he is SOB anytime he walks or takes the mask off. Remains on 15L NRB satting at 92-94%.      VITAL SIGNS:  Vital Signs Last 24 Hrs  T(C): 36.8 (18 Apr 2020 05:08), Max: 36.8 (18 Apr 2020 05:08)  T(F): 98.3 (18 Apr 2020 05:08), Max: 98.3 (18 Apr 2020 05:08)  HR: 62 (18 Apr 2020 05:08) (62 - 76)  BP: 99/61 (18 Apr 2020 05:08) (97/58 - 109/60)  BP(mean): --  RR: 20 (18 Apr 2020 05:08) (20 - 28)  SpO2: 93% (18 Apr 2020 05:08) (90% - 95%)  I&O's Summary    17 Apr 2020 07:01  -  18 Apr 2020 07:00  --------------------------------------------------------  IN: 0 mL / OUT: 800 mL / NET: -800 mL        PHYSICAL EXAM:    General: WDWN, laying in bed with NRB  Neck: no JVD  Cardiovascular: +S1/S2; RRR  Respiratory: Right sided rales appreciated, course breath sounds throughout   Gastrointestinal: NTND BS+4  Extremities: WWP; no edema  Vascular: 2+ radial, DP pulses B/L  Neurological: AAOx3; no focal deficits  Skin: No evidence of mucosal bleeding or petechiae    MEDICATIONS:  MEDICATIONS  (STANDING):  ALBUTerol    90 MICROgram(s) HFA Inhaler 2 Puff(s) Inhalation every 6 hours  dextrose 5%. 1000 milliLiter(s) (50 mL/Hr) IV Continuous <Continuous>  dextrose 50% Injectable 12.5 Gram(s) IV Push once  dextrose 50% Injectable 25 Gram(s) IV Push once  dextrose 50% Injectable 25 Gram(s) IV Push once  enoxaparin Injectable 80 milliGRAM(s) SubCutaneous every 12 hours  famotidine    Tablet 20 milliGRAM(s) Oral two times a day  metoprolol succinate ER 25 milliGRAM(s) Oral daily  midodrine 2.5 milliGRAM(s) Oral every 8 hours  tamsulosin 0.4 milliGRAM(s) Oral at bedtime    MEDICATIONS  (PRN):  acetaminophen    Suspension .. 650 milliGRAM(s) Enteral Tube every 6 hours PRN Temp greater or equal to 38C (100.4F), Moderate Pain (4 - 6)  benzocaine 15 mG/menthol 3.6 mG (Sugar-Free) Lozenge 1 Lozenge Oral three times a day PRN cough  dextrose 40% Gel 15 Gram(s) Oral once PRN Blood Glucose LESS THAN 70 milliGRAM(s)/deciliter  glucagon  Injectable 1 milliGRAM(s) IntraMuscular once PRN Glucose LESS THAN 70 milligrams/deciliter      ALLERGIES:  Allergies    No Known Allergies    Intolerances        LABS:                        12.1   4.76  )-----------( 103      ( 18 Apr 2020 07:49 )             36.7     04-18    134<L>  |  100  |  14  ----------------------------<  90  4.2   |  24  |  0.61    Ca    8.3<L>      18 Apr 2020 07:49  Mg     1.8     04-18    TPro  6.1  /  Alb  2.6<L>  /  TBili  0.7  /  DBili  x   /  AST  22  /  ALT  37  /  AlkPhos  64  04-18        CAPILLARY BLOOD GLUCOSE      POCT Blood Glucose.: 100 mg/dL (17 Apr 2020 21:15)      RADIOLOGY & ADDITIONAL TESTS: Reviewed. OVERNIGHT EVENTS: Pt desatting on venti-mask, placed on 15L NRB    SUBJECTIVE / INTERVAL HPI: Patient seen and examined at bedside. Patient says he is SOB anytime he walks or takes the mask off. Remains on 15L NRB satting at 92-94%.    ROS: 12 point systems otherwise neg    VITAL SIGNS:  Vital Signs Last 24 Hrs  T(C): 36.8 (18 Apr 2020 05:08), Max: 36.8 (18 Apr 2020 05:08)  T(F): 98.3 (18 Apr 2020 05:08), Max: 98.3 (18 Apr 2020 05:08)  HR: 62 (18 Apr 2020 05:08) (62 - 76)  BP: 99/61 (18 Apr 2020 05:08) (97/58 - 109/60)  BP(mean): --  RR: 20 (18 Apr 2020 05:08) (20 - 28)  SpO2: 93% (18 Apr 2020 05:08) (90% - 95%)  I&O's Summary    17 Apr 2020 07:01  -  18 Apr 2020 07:00  --------------------------------------------------------  IN: 0 mL / OUT: 800 mL / NET: -800 mL        PHYSICAL EXAM:    General: WDWN, laying in bed with NRB  Neck: no JVD  Cardiovascular: +S1/S2; RRR  Respiratory: Right sided rales appreciated, course breath sounds throughout   Gastrointestinal: NTND BS+4  Extremities: WWP; no edema  Vascular: 2+ radial, DP pulses B/L  Neurological: AAOx3; no focal deficits  Skin: No evidence of mucosal bleeding or petechiae    MEDICATIONS:  MEDICATIONS  (STANDING):  ALBUTerol    90 MICROgram(s) HFA Inhaler 2 Puff(s) Inhalation every 6 hours  dextrose 5%. 1000 milliLiter(s) (50 mL/Hr) IV Continuous <Continuous>  dextrose 50% Injectable 12.5 Gram(s) IV Push once  dextrose 50% Injectable 25 Gram(s) IV Push once  dextrose 50% Injectable 25 Gram(s) IV Push once  enoxaparin Injectable 80 milliGRAM(s) SubCutaneous every 12 hours  famotidine    Tablet 20 milliGRAM(s) Oral two times a day  metoprolol succinate ER 25 milliGRAM(s) Oral daily  midodrine 2.5 milliGRAM(s) Oral every 8 hours  tamsulosin 0.4 milliGRAM(s) Oral at bedtime    MEDICATIONS  (PRN):  acetaminophen    Suspension .. 650 milliGRAM(s) Enteral Tube every 6 hours PRN Temp greater or equal to 38C (100.4F), Moderate Pain (4 - 6)  benzocaine 15 mG/menthol 3.6 mG (Sugar-Free) Lozenge 1 Lozenge Oral three times a day PRN cough  dextrose 40% Gel 15 Gram(s) Oral once PRN Blood Glucose LESS THAN 70 milliGRAM(s)/deciliter  glucagon  Injectable 1 milliGRAM(s) IntraMuscular once PRN Glucose LESS THAN 70 milligrams/deciliter      ALLERGIES:  Allergies    No Known Allergies    Intolerances        LABS:                        12.1   4.76  )-----------( 103      ( 18 Apr 2020 07:49 )             36.7     04-18    134<L>  |  100  |  14  ----------------------------<  90  4.2   |  24  |  0.61    Ca    8.3<L>      18 Apr 2020 07:49  Mg     1.8     04-18    TPro  6.1  /  Alb  2.6<L>  /  TBili  0.7  /  DBili  x   /  AST  22  /  ALT  37  /  AlkPhos  64  04-18        CAPILLARY BLOOD GLUCOSE      POCT Blood Glucose.: 100 mg/dL (17 Apr 2020 21:15)      RADIOLOGY & ADDITIONAL TESTS: Reviewed.

## 2020-04-18 NOTE — PROGRESS NOTE ADULT - PROBLEM SELECTOR PLAN 3
slowly downtrending since 4/14. Likely 2/2 to covid, no evidence of mucosal bleeding or bruising. Not on any meds causing decreased PLTs  - continue to trend CBCs  - peripheral smear  - transfuse for PLT<50k  slowly downtrending since 4/14. Likely 2/2 to covid, no evidence of mucosal bleeding or bruising. Not on any meds causing decreased PLTs. DDX includes HIT or Toci induced ITP  -HIT panel sent  -Heme onc consulted, f/u recs  - continue to trend CBCs  - peripheral smear  - transfuse for PLT<50k

## 2020-04-18 NOTE — PROGRESS NOTE ADULT - PROBLEM SELECTOR PLAN 4
patient with episode of tachycardia 4/17. Shortly after moving OOB to chair. EKG consistent with Sinus tach, RAD with RBBB, prior to Toprol administration. Asymptomatic.  -Resolved  - c/w toprol XL 25mg   - c/w enoxaparin for anticoagulation

## 2020-04-18 NOTE — PROGRESS NOTE ADULT - PROBLEM SELECTOR PLAN 2
S/p COVID bundle: azithromycin, hydroxychloroquine, tociluzumab 4/2, methylprednisolone 40mg IV BID. CRP markedly worse today at 3.52 (from 0.44)   - Tylenol PRN for fever  - Robitussin PRN for cough  - no nebulizers, MDI only  - COVID Isolation precautions: contact and droplet   - continue to monitor daily CBC w/ differential, CMP, Mg, Phos, CRP, ferritin, and D-dimer

## 2020-04-18 NOTE — PROGRESS NOTE ADULT - ASSESSMENT
81 yo M h/o BPH, HLD presented 4/2 with fever x 3 days. Intubated on admission, extubated on 4/4. s/p Plaquenil/azithromycin, Toci and five day steroid course (end 4/7). Course complicated by afib with RVR, now rate controlled on toprol. 81 yo M h/o BPH, HLD presented 4/2 with fever x 3 days. Intubated on admission, extubated on 4/4. s/p Plaquenil/azithromycin, Toci and five day steroid course (ended on 4/7). Course complicated by afib with RVR, now rate controlled on toprol. Patient is currently being treated for acute resp failure secondary to covid infection.

## 2020-04-19 LAB
ALBUMIN SERPL ELPH-MCNC: 2.9 G/DL — LOW (ref 3.3–5)
ALP SERPL-CCNC: 63 U/L — SIGNIFICANT CHANGE UP (ref 40–120)
ALT FLD-CCNC: 32 U/L — SIGNIFICANT CHANGE UP (ref 10–45)
ANION GAP SERPL CALC-SCNC: 10 MMOL/L — SIGNIFICANT CHANGE UP (ref 5–17)
APTT BLD: 53.4 SEC — HIGH (ref 27.5–36.3)
AST SERPL-CCNC: 16 U/L — SIGNIFICANT CHANGE UP (ref 10–40)
BASOPHILS # BLD AUTO: 0.02 K/UL — SIGNIFICANT CHANGE UP (ref 0–0.2)
BASOPHILS NFR BLD AUTO: 0.4 % — SIGNIFICANT CHANGE UP (ref 0–2)
BILIRUB SERPL-MCNC: 0.7 MG/DL — SIGNIFICANT CHANGE UP (ref 0.2–1.2)
BUN SERPL-MCNC: 14 MG/DL — SIGNIFICANT CHANGE UP (ref 7–23)
CALCIUM SERPL-MCNC: 8.5 MG/DL — SIGNIFICANT CHANGE UP (ref 8.4–10.5)
CHLORIDE SERPL-SCNC: 101 MMOL/L — SIGNIFICANT CHANGE UP (ref 96–108)
CO2 SERPL-SCNC: 28 MMOL/L — SIGNIFICANT CHANGE UP (ref 22–31)
CREAT SERPL-MCNC: 0.65 MG/DL — SIGNIFICANT CHANGE UP (ref 0.5–1.3)
CRP SERPL-MCNC: 7.43 MG/DL — HIGH (ref 0–0.4)
D DIMER BLD IA.RAPID-MCNC: 153 NG/ML DDU — SIGNIFICANT CHANGE UP
EOSINOPHIL # BLD AUTO: 0.1 K/UL — SIGNIFICANT CHANGE UP (ref 0–0.5)
EOSINOPHIL NFR BLD AUTO: 2.2 % — SIGNIFICANT CHANGE UP (ref 0–6)
FERRITIN SERPL-MCNC: 483 NG/ML — HIGH (ref 30–400)
FOLATE SERPL-MCNC: 8 NG/ML — SIGNIFICANT CHANGE UP
GLUCOSE SERPL-MCNC: 98 MG/DL — SIGNIFICANT CHANGE UP (ref 70–99)
HCT VFR BLD CALC: 38.1 % — LOW (ref 39–50)
HEPARIN-PF4 AB RESULT: <0.6 U/ML — SIGNIFICANT CHANGE UP (ref 0–0.9)
HGB BLD-MCNC: 12.4 G/DL — LOW (ref 13–17)
IMM GRANULOCYTES NFR BLD AUTO: 0.4 % — SIGNIFICANT CHANGE UP (ref 0–1.5)
INR BLD: 1.43 — HIGH (ref 0.88–1.16)
LYMPHOCYTES # BLD AUTO: 0.59 K/UL — LOW (ref 1–3.3)
LYMPHOCYTES # BLD AUTO: 13.2 % — SIGNIFICANT CHANGE UP (ref 13–44)
MAGNESIUM SERPL-MCNC: 1.8 MG/DL — SIGNIFICANT CHANGE UP (ref 1.6–2.6)
MCHC RBC-ENTMCNC: 29 PG — SIGNIFICANT CHANGE UP (ref 27–34)
MCHC RBC-ENTMCNC: 32.5 GM/DL — SIGNIFICANT CHANGE UP (ref 32–36)
MCV RBC AUTO: 89 FL — SIGNIFICANT CHANGE UP (ref 80–100)
MONOCYTES # BLD AUTO: 0.86 K/UL — SIGNIFICANT CHANGE UP (ref 0–0.9)
MONOCYTES NFR BLD AUTO: 19.2 % — HIGH (ref 2–14)
NEUTROPHILS # BLD AUTO: 2.89 K/UL — SIGNIFICANT CHANGE UP (ref 1.8–7.4)
NEUTROPHILS NFR BLD AUTO: 64.6 % — SIGNIFICANT CHANGE UP (ref 43–77)
NRBC # BLD: 0 /100 WBCS — SIGNIFICANT CHANGE UP (ref 0–0)
PF4 HEPARIN CMPLX AB SER-ACNC: NEGATIVE — SIGNIFICANT CHANGE UP
PHOSPHATE SERPL-MCNC: 2.8 MG/DL — SIGNIFICANT CHANGE UP (ref 2.5–4.5)
PLATELET # BLD AUTO: 92 K/UL — LOW (ref 150–400)
POTASSIUM SERPL-MCNC: 4 MMOL/L — SIGNIFICANT CHANGE UP (ref 3.5–5.3)
POTASSIUM SERPL-SCNC: 4 MMOL/L — SIGNIFICANT CHANGE UP (ref 3.5–5.3)
PROT SERPL-MCNC: 6.6 G/DL — SIGNIFICANT CHANGE UP (ref 6–8.3)
PROTHROM AB SERPL-ACNC: 16.5 SEC — HIGH (ref 10–12.9)
RBC # BLD: 4.28 M/UL — SIGNIFICANT CHANGE UP (ref 4.2–5.8)
RBC # FLD: 14.6 % — HIGH (ref 10.3–14.5)
SODIUM SERPL-SCNC: 139 MMOL/L — SIGNIFICANT CHANGE UP (ref 135–145)
VIT B12 SERPL-MCNC: 395 PG/ML — SIGNIFICANT CHANGE UP (ref 232–1245)
WBC # BLD: 4.48 K/UL — SIGNIFICANT CHANGE UP (ref 3.8–10.5)
WBC # FLD AUTO: 4.48 K/UL — SIGNIFICANT CHANGE UP (ref 3.8–10.5)

## 2020-04-19 PROCEDURE — 99233 SBSQ HOSP IP/OBS HIGH 50: CPT

## 2020-04-19 RX ADMIN — ENOXAPARIN SODIUM 80 MILLIGRAM(S): 100 INJECTION SUBCUTANEOUS at 05:02

## 2020-04-19 RX ADMIN — ALBUTEROL 2 PUFF(S): 90 AEROSOL, METERED ORAL at 19:04

## 2020-04-19 RX ADMIN — FAMOTIDINE 20 MILLIGRAM(S): 10 INJECTION INTRAVENOUS at 19:00

## 2020-04-19 RX ADMIN — ALBUTEROL 2 PUFF(S): 90 AEROSOL, METERED ORAL at 00:00

## 2020-04-19 RX ADMIN — MIDODRINE HYDROCHLORIDE 2.5 MILLIGRAM(S): 2.5 TABLET ORAL at 12:26

## 2020-04-19 RX ADMIN — ALBUTEROL 2 PUFF(S): 90 AEROSOL, METERED ORAL at 12:25

## 2020-04-19 RX ADMIN — Medication 25 MILLIGRAM(S): at 05:15

## 2020-04-19 RX ADMIN — ALBUTEROL 2 PUFF(S): 90 AEROSOL, METERED ORAL at 05:22

## 2020-04-19 RX ADMIN — MIDODRINE HYDROCHLORIDE 2.5 MILLIGRAM(S): 2.5 TABLET ORAL at 21:25

## 2020-04-19 RX ADMIN — TAMSULOSIN HYDROCHLORIDE 0.4 MILLIGRAM(S): 0.4 CAPSULE ORAL at 21:25

## 2020-04-19 RX ADMIN — MIDODRINE HYDROCHLORIDE 2.5 MILLIGRAM(S): 2.5 TABLET ORAL at 05:02

## 2020-04-19 RX ADMIN — Medication 40 MILLIGRAM(S): at 10:46

## 2020-04-19 RX ADMIN — ENOXAPARIN SODIUM 80 MILLIGRAM(S): 100 INJECTION SUBCUTANEOUS at 19:00

## 2020-04-19 RX ADMIN — Medication 40 MILLIGRAM(S): at 21:25

## 2020-04-19 RX ADMIN — FAMOTIDINE 20 MILLIGRAM(S): 10 INJECTION INTRAVENOUS at 05:02

## 2020-04-19 NOTE — PROGRESS NOTE ADULT - PROBLEM SELECTOR PLAN 3
PLT 92 slowly downtrending since 4/14. Likely 2/2 to covid, no evidence of mucosal bleeding or bruising. Not on any meds causing decreased PLTs. DDX includes HIT or Toci induced ITP  - HIT panel sent  - Heme onc consulted, f/u recs  - continue to trend CBCs  - peripheral smear  - transfuse for PLT<50k

## 2020-04-19 NOTE — PROGRESS NOTE ADULT - SUBJECTIVE AND OBJECTIVE BOX
OVERNIGHT EVENTS:  BIANCA   SUBJECTIVE / INTERVAL HPI: Patient seen and examined at bedside.   Patient laying comfortably in bed, awake and alert without his NRB on his ipad. When asked, he says he is trialing himself off oxygen. using portable spo2 monitor, he o2 was 77% and improved to 94% on 15L NRB. Explained to patient he must not remove mask without medical staff titrating oxygen slowly and he understood. He is concerned about long term prognosis and persistent oxygen requirments. Patient denies h/n/v/d, fever, chills, cp, palpitations, abd pain, leg swelling, rashes, dysuria, and changes in BM.     VITAL SIGNS:  Vital Signs Last 24 Hrs  T(C): 36.6 (19 Apr 2020 05:14), Max: 36.7 (18 Apr 2020 20:06)  T(F): 97.8 (19 Apr 2020 05:14), Max: 98.1 (18 Apr 2020 20:06)  HR: 68 (19 Apr 2020 05:14) (62 - 72)  BP: 111/63 (19 Apr 2020 05:14) (111/63 - 126/81)  BP(mean): --  RR: 20 (19 Apr 2020 05:14) (20 - 20)  SpO2: 96% (19 Apr 2020 05:14) (94% - 96%)    PHYSICAL EXAM:    General: elderly male in NAD   HEENT: NC/AT; PERRL, anicteric sclera; MMM  Neck: supple  Cardiovascular: +S1/S2; RRR  Respiratory: CTA B/L; no W/R/R  Gastrointestinal: soft, NT/ND; +BSx4  Extremities: WWP; no edema, clubbing or cyanosis  Vascular: 2+ radial, DP/PT pulses B/L  Neurological: AAOx3; no focal deficits    MEDICATIONS:  MEDICATIONS  (STANDING):  ALBUTerol    90 MICROgram(s) HFA Inhaler 2 Puff(s) Inhalation every 6 hours  dextrose 5%. 1000 milliLiter(s) (50 mL/Hr) IV Continuous <Continuous>  dextrose 50% Injectable 12.5 Gram(s) IV Push once  dextrose 50% Injectable 25 Gram(s) IV Push once  dextrose 50% Injectable 25 Gram(s) IV Push once  enoxaparin Injectable 80 milliGRAM(s) SubCutaneous every 12 hours  famotidine    Tablet 20 milliGRAM(s) Oral two times a day  metoprolol succinate ER 25 milliGRAM(s) Oral daily  midodrine 2.5 milliGRAM(s) Oral every 8 hours  tamsulosin 0.4 milliGRAM(s) Oral at bedtime    MEDICATIONS  (PRN):  acetaminophen    Suspension .. 650 milliGRAM(s) Enteral Tube every 6 hours PRN Temp greater or equal to 38C (100.4F), Moderate Pain (4 - 6)  benzocaine 15 mG/menthol 3.6 mG (Sugar-Free) Lozenge 1 Lozenge Oral three times a day PRN cough  dextrose 40% Gel 15 Gram(s) Oral once PRN Blood Glucose LESS THAN 70 milliGRAM(s)/deciliter  glucagon  Injectable 1 milliGRAM(s) IntraMuscular once PRN Glucose LESS THAN 70 milligrams/deciliter      ALLERGIES:  Allergies    No Known Allergies    Intolerances    LABS:                        12.4   4.48  )-----------( 92       ( 19 Apr 2020 08:14 )             38.1     04-19    139  |  101  |  14  ----------------------------<  98  4.0   |  28  |  0.65    Ca    8.5      19 Apr 2020 08:14  Phos  2.8     04-19  Mg     1.8     04-19    TPro  6.6  /  Alb  2.9<L>  /  TBili  0.7  /  DBili  x   /  AST  16  /  ALT  32  /  AlkPhos  63  04-19    PT/INR - ( 19 Apr 2020 08:14 )   PT: 16.5 sec;   INR: 1.43          PTT - ( 19 Apr 2020 08:14 )  PTT:53.4 sec    CAPILLARY BLOOD GLUCOSE      POCT Blood Glucose.: 100 mg/dL (17 Apr 2020 21:15)      RADIOLOGY & ADDITIONAL TESTS: Reviewed.

## 2020-04-19 NOTE — PROGRESS NOTE ADULT - PROBLEM SELECTOR PLAN 2
S/p COVID bundle: azithromycin, hydroxychloroquine, tociluzumab 4/2, methylprednisolone 40mg IV BID. CRP trending up again today at 7.43 (from 3.52)   - Tylenol PRN for fever  - Robitussin PRN for cough  - no nebulizers, MDI only  - COVID Isolation precautions: contact and droplet   - continue to monitor daily CBC w/ differential, CMP, Mg, Phos, CRP, ferritin, and D-dimer

## 2020-04-19 NOTE — PHYSICAL THERAPY INITIAL EVALUATION ADULT - PLANNED THERAPY INTERVENTIONS, PT EVAL
balance training/gait training/transfer training/manual therapy techniques/strengthening/postural re-education/bed mobility training

## 2020-04-19 NOTE — PHYSICAL THERAPY INITIAL EVALUATION ADULT - GENERAL OBSERVATIONS, REHAB EVAL
Pt received supine with HOB elevated in NAD all lines in tact, call bell in reach, cleared for PT Evaluation by RN Tyrel, pt agreeable.

## 2020-04-19 NOTE — CONSULT NOTE ADULT - SUBJECTIVE AND OBJECTIVE BOX
Hematology Oncology Consult Note (Dr. Oakes )    79 y/o M with HTN, HLD, and BPH presenting with fevers and cough of 7-8 days duration, and diarrhea of one days duration. Patient notified PCP (Dr. Nunez) of fevers and cough on 3/27-3/28. Per wife, patient Temperatures 101-105, managed with Tylenol. PCP aware and informed and prescribed azithromycin and hydroxychloroquine (3/29), per wife completed by patient. Patient purchased pulse-ox was saturating 95-96% on Monday, however without improvement in symptoms and was re-prescribed hydroxychloroquine, but did not take any extra doses. Patient began having uncontrollable diarrhea one day prior to presentation. This morning patient saturating 84-86%, PCP instructed patient to report to ED, intubated on arrival for hypoxic respiratory failure and noted to be in new onset atrial fibrillation with RVR. No known sick contacts. Travelled to [facility] 2/16. Per wife and PCP denies SOB, abdominal pain, nausea, vomiting or palpations. ROS positive for fever, cough, and diarrhea.       PAST MEDICAL & SURGICAL HISTORY:  BPH (benign prostatic hyperplasia)  HLD (hyperlipidemia)  BPH (benign prostatic hyperplasia)  Hypertension  No significant past surgical history      Allergies: NKDA       Medications:  enoxaparin Injectable 80 milliGRAM(s) SubCutaneous every 12 hours  MEDICATIONS  (STANDING):  ALBUTerol    90 MICROgram(s) HFA Inhaler 2 Puff(s) Inhalation every 6 hours  dextrose 5%. 1000 milliLiter(s) (50 mL/Hr) IV Continuous <Continuous>  dextrose 50% Injectable 12.5 Gram(s) IV Push once  dextrose 50% Injectable 25 Gram(s) IV Push once  dextrose 50% Injectable 25 Gram(s) IV Push once  enoxaparin Injectable 80 milliGRAM(s) SubCutaneous every 12 hours  famotidine    Tablet 20 milliGRAM(s) Oral two times a day  methylPREDNISolone sodium succinate Injectable 40 milliGRAM(s) IV Push every 12 hours  metoprolol succinate ER 25 milliGRAM(s) Oral daily  midodrine 2.5 milliGRAM(s) Oral every 8 hours  tamsulosin 0.4 milliGRAM(s) Oral at bedtime        Social History: Lives with wife. Per wife:  	Never a smoker.  	No marijuana.  	No vaping.  	No illicit drug use.     	Social drinking.    FAMILY HISTORY:  No pertinent family history in first degree relatives      PHYSICAL EXAM:    T(F): 97.7 (04-19-20 @ 10:03), Max: 98.1 (04-18-20 @ 20:06)  HR: 72 (04-19-20 @ 10:03) (68 - 72)  BP: 104/56 (04-19-20 @ 10:03) (104/56 - 112/76)  RR: 18 (04-19-20 @ 10:03) (18 - 20)  SpO2: 93% (04-19-20 @ 11:30) (92% - 96%)  Wt(kg): --    Daily     Daily     General: elderly male in NAD   HEENT: NC/AT; PERRL, anicteric sclera; MMM  Neck: supple  Cardiovascular: +S1/S2; RRR  Respiratory: CTA B/L; no W/R/R  Gastrointestinal: soft, NT/ND; +BSx4  Extremities: WWP; no edema, clubbing or cyanosis  Vascular: 2+ radial, DP/PT pulses B/L  Neurological: AAOx3; no focal deficits            Labs:                          12.4   4.48  )-----------( 92       ( 19 Apr 2020 08:14 )             38.1     CBC Full  -  ( 19 Apr 2020 08:14 )  WBC Count : 4.48 K/uL  RBC Count : 4.28 M/uL  Hemoglobin : 12.4 g/dL  Hematocrit : 38.1 %  Platelet Count - Automated : 92 K/uL  Mean Cell Volume : 89.0 fl  Mean Cell Hemoglobin : 29.0 pg  Mean Cell Hemoglobin Concentration : 32.5 gm/dL  Auto Neutrophil # : 2.89 K/uL  Auto Lymphocyte # : 0.59 K/uL  Auto Monocyte # : 0.86 K/uL  Auto Eosinophil # : 0.10 K/uL  Auto Basophil # : 0.02 K/uL  Auto Neutrophil % : 64.6 %  Auto Lymphocyte % : 13.2 %  Auto Monocyte % : 19.2 %  Auto Eosinophil % : 2.2 %  Auto Basophil % : 0.4 %    PT/INR - ( 19 Apr 2020 08:14 )   PT: 16.5 sec;   INR: 1.43          PTT - ( 19 Apr 2020 08:14 )  PTT:53.4 sec    04-19    139  |  101  |  14  ----------------------------<  98  4.0   |  28  |  0.65    Ca    8.5      19 Apr 2020 08:14  Phos  2.8     04-19  Mg     1.8     04-19    TPro  6.6  /  Alb  2.9<L>  /  TBili  0.7  /  DBili  x   /  AST  16  /  ALT  32  /  AlkPhos  63  04-19        < from: CT Chest No Cont (04.13.20 @ 16:10) >      Limited evaluation of the upper abdomen demonstrates no abnormality.    Evaluation of the osseous structures demonstrates degenerative changes. Hemangioma L1 vertebral body. Slight wedging of T8 vertebral body of indeterminate age.      IMPRESSION: Extensive bilateral peripheral and peribroncho vascular consolidation. No evidence of traction bronchiectasis. No fibrosis identified.       < end of copied text >

## 2020-04-19 NOTE — PHYSICAL THERAPY INITIAL EVALUATION ADULT - ADDITIONAL COMMENTS
Pt lives in an apartment with his wife, +elevator, independent with all functional mobility without assistive device prior to admission.

## 2020-04-19 NOTE — CONSULT NOTE ADULT - ASSESSMENT
79 yo M h/o BPH, HLD presented 4/2 with fever x 3 days. Intubated on admission, extubated on 4/4. s/p Plaquenil/azithromycin, Toci and five day steroid course (ended on 4/7). Course complicated by afib with RVR, now rate controlled on toprol. Patient is currently being treated for acute resp failure secondary to covid infection on 13L NRB satting 92-94%. Found to have thrombocytopenia, hematology consulted.       #Thrombocytopenia     no signs of bleeding/petechaie, thrombocytopenia present upon admission   HIT w/u PF4 negative, awaiting CORIN - on treatment dose LMWH for afib new onset   other possibility includes drug induced ITP consider Tocilizumab, giant plts seen on smear   awaiting B12/Folate levels, obtain hepatitis panel/CAITLYN, consider imaging of the spleen/assess splenomegaly  no signs of hemolysis (stable ldh/hapto) or significant anemia, anabella negative  coagulopathy likely related to anticoagulation, possible malnutrition given low albumin, vit K   transfusion plts if bleeding or requires procedure to keep above 50K, if no bleeding will consider IVIG if <50K for ITP    to d/w Dr. Oakes 79 yo M h/o BPH, HLD presented 4/2 with fever x 3 days. Intubated on admission, extubated on 4/4. s/p Plaquenil/azithromycin, Toci and five day steroid course (ended on 4/7). Course complicated by afib with RVR, now rate controlled on toprol. Patient is currently being treated for acute resp failure secondary to covid infection on 13L NRB satting 92-94%. Found to have thrombocytopenia, hematology consulted.       #Thrombocytopenia     no signs of bleeding/petechaie, thrombocytopenia present upon admission   HIT w/u PF4 negative, awaiting CORIN - on treatment dose LMWH for afib new onset   other possibility includes drug induced ITP consider Tocilizumab, giant plts seen on smear   awaiting B12/Folate levels, obtain hepatitis panel/CAITLYN, consider imaging of the spleen/assess splenomegaly  no signs of hemolysis (stable ldh/hapto) or significant anemia, anabella negative  coagulopathy likely related to anticoagulation, possible malnutrition given low albumin, vit K   transfusion plts if bleeding or requires procedure to keep above 50K      d/w Dr. Oakes

## 2020-04-19 NOTE — PROGRESS NOTE ADULT - ASSESSMENT
81 yo M h/o BPH, HLD presented 4/2 with fever x 3 days. Intubated on admission, extubated on 4/4. s/p Plaquenil/azithromycin, Toci and five day steroid course (ended on 4/7). Course complicated by afib with RVR, now rate controlled on toprol. Patient is currently being treated for acute resp failure secondary to covid infection on 13L NRB satting 92-94%.

## 2020-04-19 NOTE — PHYSICAL THERAPY INITIAL EVALUATION ADULT - PERTINENT HX OF CURRENT PROBLEM, REHAB EVAL
79 yo M h/o BPH, HLD presented 4/2 with fever x 3 days. Intubated on admission, extubated on 4/4. s/p Plaquenil/azithromycin, Toci and five day steroid course (ended on 4/7). Course complicated by afib with RVR, now rate controlled on toprol. Patient is currently being treated for acute resp failure secondary to covid infection on 13L NRB satting 92-94%.

## 2020-04-20 LAB
ALBUMIN SERPL ELPH-MCNC: 2.7 G/DL — LOW (ref 3.3–5)
ALP SERPL-CCNC: 61 U/L — SIGNIFICANT CHANGE UP (ref 40–120)
ALT FLD-CCNC: 30 U/L — SIGNIFICANT CHANGE UP (ref 10–45)
ANION GAP SERPL CALC-SCNC: 9 MMOL/L — SIGNIFICANT CHANGE UP (ref 5–17)
AST SERPL-CCNC: 17 U/L — SIGNIFICANT CHANGE UP (ref 10–40)
BASOPHILS # BLD AUTO: 0 K/UL — SIGNIFICANT CHANGE UP (ref 0–0.2)
BASOPHILS NFR BLD AUTO: 0 % — SIGNIFICANT CHANGE UP (ref 0–2)
BILIRUB SERPL-MCNC: 0.4 MG/DL — SIGNIFICANT CHANGE UP (ref 0.2–1.2)
BUN SERPL-MCNC: 16 MG/DL — SIGNIFICANT CHANGE UP (ref 7–23)
CALCIUM SERPL-MCNC: 8.8 MG/DL — SIGNIFICANT CHANGE UP (ref 8.4–10.5)
CHLORIDE SERPL-SCNC: 101 MMOL/L — SIGNIFICANT CHANGE UP (ref 96–108)
CO2 SERPL-SCNC: 27 MMOL/L — SIGNIFICANT CHANGE UP (ref 22–31)
CREAT SERPL-MCNC: 0.6 MG/DL — SIGNIFICANT CHANGE UP (ref 0.5–1.3)
CRP SERPL-MCNC: 5.51 MG/DL — HIGH (ref 0–0.4)
D DIMER BLD IA.RAPID-MCNC: <150 NG/ML DDU — SIGNIFICANT CHANGE UP
EOSINOPHIL # BLD AUTO: 0 K/UL — SIGNIFICANT CHANGE UP (ref 0–0.5)
EOSINOPHIL NFR BLD AUTO: 0 % — SIGNIFICANT CHANGE UP (ref 0–6)
FERRITIN SERPL-MCNC: 328 NG/ML — SIGNIFICANT CHANGE UP (ref 30–400)
GLUCOSE SERPL-MCNC: 136 MG/DL — HIGH (ref 70–99)
HAV IGM SER-ACNC: SIGNIFICANT CHANGE UP
HBV CORE IGM SER-ACNC: SIGNIFICANT CHANGE UP
HBV SURFACE AG SER-ACNC: SIGNIFICANT CHANGE UP
HCT VFR BLD CALC: 37.6 % — LOW (ref 39–50)
HCV AB S/CO SERPL IA: 0.24 S/CO — SIGNIFICANT CHANGE UP
HCV AB SERPL-IMP: SIGNIFICANT CHANGE UP
HGB BLD-MCNC: 12.1 G/DL — LOW (ref 13–17)
IMM GRANULOCYTES NFR BLD AUTO: 0.6 % — SIGNIFICANT CHANGE UP (ref 0–1.5)
LYMPHOCYTES # BLD AUTO: 0.34 K/UL — LOW (ref 1–3.3)
LYMPHOCYTES # BLD AUTO: 5.3 % — LOW (ref 13–44)
MAGNESIUM SERPL-MCNC: 1.9 MG/DL — SIGNIFICANT CHANGE UP (ref 1.6–2.6)
MCHC RBC-ENTMCNC: 28.7 PG — SIGNIFICANT CHANGE UP (ref 27–34)
MCHC RBC-ENTMCNC: 32.2 GM/DL — SIGNIFICANT CHANGE UP (ref 32–36)
MCV RBC AUTO: 89.3 FL — SIGNIFICANT CHANGE UP (ref 80–100)
MONOCYTES # BLD AUTO: 0.78 K/UL — SIGNIFICANT CHANGE UP (ref 0–0.9)
MONOCYTES NFR BLD AUTO: 12.2 % — SIGNIFICANT CHANGE UP (ref 2–14)
NEUTROPHILS # BLD AUTO: 5.25 K/UL — SIGNIFICANT CHANGE UP (ref 1.8–7.4)
NEUTROPHILS NFR BLD AUTO: 81.9 % — HIGH (ref 43–77)
NRBC # BLD: 0 /100 WBCS — SIGNIFICANT CHANGE UP (ref 0–0)
PHOSPHATE SERPL-MCNC: 3.4 MG/DL — SIGNIFICANT CHANGE UP (ref 2.5–4.5)
PLATELET # BLD AUTO: 103 K/UL — LOW (ref 150–400)
POTASSIUM SERPL-MCNC: 4.5 MMOL/L — SIGNIFICANT CHANGE UP (ref 3.5–5.3)
POTASSIUM SERPL-SCNC: 4.5 MMOL/L — SIGNIFICANT CHANGE UP (ref 3.5–5.3)
PROT SERPL-MCNC: 6.4 G/DL — SIGNIFICANT CHANGE UP (ref 6–8.3)
RBC # BLD: 4.21 M/UL — SIGNIFICANT CHANGE UP (ref 4.2–5.8)
RBC # FLD: 14.5 % — SIGNIFICANT CHANGE UP (ref 10.3–14.5)
SODIUM SERPL-SCNC: 137 MMOL/L — SIGNIFICANT CHANGE UP (ref 135–145)
WBC # BLD: 6.41 K/UL — SIGNIFICANT CHANGE UP (ref 3.8–10.5)
WBC # FLD AUTO: 6.41 K/UL — SIGNIFICANT CHANGE UP (ref 3.8–10.5)

## 2020-04-20 PROCEDURE — 99233 SBSQ HOSP IP/OBS HIGH 50: CPT | Mod: CS,GC

## 2020-04-20 RX ADMIN — ALBUTEROL 2 PUFF(S): 90 AEROSOL, METERED ORAL at 17:06

## 2020-04-20 RX ADMIN — ALBUTEROL 2 PUFF(S): 90 AEROSOL, METERED ORAL at 22:11

## 2020-04-20 RX ADMIN — Medication 40 MILLIGRAM(S): at 11:14

## 2020-04-20 RX ADMIN — MIDODRINE HYDROCHLORIDE 2.5 MILLIGRAM(S): 2.5 TABLET ORAL at 05:00

## 2020-04-20 RX ADMIN — Medication 25 MILLIGRAM(S): at 05:01

## 2020-04-20 RX ADMIN — Medication 40 MILLIGRAM(S): at 22:10

## 2020-04-20 RX ADMIN — FAMOTIDINE 20 MILLIGRAM(S): 10 INJECTION INTRAVENOUS at 05:00

## 2020-04-20 RX ADMIN — TAMSULOSIN HYDROCHLORIDE 0.4 MILLIGRAM(S): 0.4 CAPSULE ORAL at 22:09

## 2020-04-20 RX ADMIN — MIDODRINE HYDROCHLORIDE 2.5 MILLIGRAM(S): 2.5 TABLET ORAL at 11:14

## 2020-04-20 RX ADMIN — ALBUTEROL 2 PUFF(S): 90 AEROSOL, METERED ORAL at 00:18

## 2020-04-20 RX ADMIN — ENOXAPARIN SODIUM 80 MILLIGRAM(S): 100 INJECTION SUBCUTANEOUS at 05:01

## 2020-04-20 RX ADMIN — ENOXAPARIN SODIUM 80 MILLIGRAM(S): 100 INJECTION SUBCUTANEOUS at 17:06

## 2020-04-20 RX ADMIN — ALBUTEROL 2 PUFF(S): 90 AEROSOL, METERED ORAL at 05:01

## 2020-04-20 RX ADMIN — FAMOTIDINE 20 MILLIGRAM(S): 10 INJECTION INTRAVENOUS at 17:06

## 2020-04-20 RX ADMIN — ALBUTEROL 2 PUFF(S): 90 AEROSOL, METERED ORAL at 11:15

## 2020-04-20 NOTE — PROGRESS NOTE ADULT - ASSESSMENT
81 yo M h/o BPH, HLD presented 4/2 with fever x 3 days. Intubated on admission, extubated on 4/4. s/p Plaquenil/azithromycin, Toci and five day steroid course (ended on 4/7). Course complicated by afib with RVR, now rate controlled on toprol. Patient is currently being treated for acute resp failure secondary to covid infection on 15L NRB satting 92-94%.

## 2020-04-20 NOTE — PROGRESS NOTE ADULT - PROBLEM SELECTOR PLAN 2
S/p COVID bundle: azithromycin, hydroxychloroquine, tociluzumab 4/2, methylprednisolone 40mg IV BID. CRP downtrending today at 5.51 (from 7.3)   -c/w solumedfrol 40mg IV (4/19-)  - Tylenol PRN for fever  - Robitussin PRN for cough  - no nebulizers, MDI only  - COVID Isolation precautions: contact and droplet   - continue to monitor daily CBC w/ differential, CMP, Mg, Phos, CRP, ferritin, and D-dimer

## 2020-04-20 NOTE — PROGRESS NOTE ADULT - PROBLEM SELECTOR PLAN 3
slowly downtrending since 4/14 but improved 4/20. Likely 2/2 to covid, no evidence of mucosal bleeding or bruising. Not on any meds causing decreased PLTs. DDX includes HIT or Toci induced ITP  - HIT antibody negative, awaiting CORIN  - Heme onc consulted, f/u recs  - continue to trend CBCs  - peripheral smear  - transfuse for PLT<50k

## 2020-04-20 NOTE — PROGRESS NOTE ADULT - SUBJECTIVE AND OBJECTIVE BOX
OVERNIGHT EVENTS: BIANCA    SUBJECTIVE / INTERVAL HPI: Patient seen and examined at bedside. Found with his NRB off his face as he was eating breakfast. Advised him to keep his NRB on as he was desatting to the low 80's with O2. Otherwise denies any SOB or dyspnea. 12 points ROS otherwise negative.     VITAL SIGNS:  Vital Signs Last 24 Hrs  T(C): 36.4 (20 Apr 2020 08:14), Max: 36.6 (19 Apr 2020 21:23)  T(F): 97.5 (20 Apr 2020 08:14), Max: 97.9 (19 Apr 2020 21:23)  HR: 75 (20 Apr 2020 08:14) (68 - 76)  BP: 108/67 (20 Apr 2020 08:14) (104/56 - 134/74)  BP(mean): 81 (20 Apr 2020 08:14) (81 - 81)  RR: 20 (20 Apr 2020 08:14) (18 - 20)  SpO2: 96% (20 Apr 2020 08:14) (92% - 96%)  I&O's Summary    19 Apr 2020 07:01  -  20 Apr 2020 07:00  --------------------------------------------------------  IN: 0 mL / OUT: 1190 mL / NET: -1190 mL        PHYSICAL EXAM:    General: WDWN, laying in bed with NRB  Neck: no JVD  Cardiovascular: +S1/S2; RRR  Respiratory: Right sided rales appreciated, course breath sounds throughout   Gastrointestinal: NTND BS+4  Extremities: WWP; no edema  Vascular: 2+ radial, DP pulses B/L  Neurological: AAOx3; no focal deficits  Skin: No evidence of mucosal bleeding or petechiae    MEDICATIONS:  MEDICATIONS  (STANDING):  ALBUTerol    90 MICROgram(s) HFA Inhaler 2 Puff(s) Inhalation every 6 hours  dextrose 5%. 1000 milliLiter(s) (50 mL/Hr) IV Continuous <Continuous>  dextrose 50% Injectable 12.5 Gram(s) IV Push once  dextrose 50% Injectable 25 Gram(s) IV Push once  dextrose 50% Injectable 25 Gram(s) IV Push once  enoxaparin Injectable 80 milliGRAM(s) SubCutaneous every 12 hours  famotidine    Tablet 20 milliGRAM(s) Oral two times a day  methylPREDNISolone sodium succinate Injectable 40 milliGRAM(s) IV Push every 12 hours  metoprolol succinate ER 25 milliGRAM(s) Oral daily  midodrine 2.5 milliGRAM(s) Oral every 8 hours  tamsulosin 0.4 milliGRAM(s) Oral at bedtime    MEDICATIONS  (PRN):  acetaminophen    Suspension .. 650 milliGRAM(s) Enteral Tube every 6 hours PRN Temp greater or equal to 38C (100.4F), Moderate Pain (4 - 6)  benzocaine 15 mG/menthol 3.6 mG (Sugar-Free) Lozenge 1 Lozenge Oral three times a day PRN cough  dextrose 40% Gel 15 Gram(s) Oral once PRN Blood Glucose LESS THAN 70 milliGRAM(s)/deciliter  glucagon  Injectable 1 milliGRAM(s) IntraMuscular once PRN Glucose LESS THAN 70 milligrams/deciliter      ALLERGIES:  Allergies    No Known Allergies    Intolerances        LABS:                        12.1   6.41  )-----------( 103      ( 20 Apr 2020 07:16 )             37.6     04-20    137  |  101  |  16  ----------------------------<  136<H>  4.5   |  27  |  0.60    Ca    8.8      20 Apr 2020 07:16  Phos  3.4     04-20  Mg     1.9     04-20    TPro  6.4  /  Alb  2.7<L>  /  TBili  0.4  /  DBili  x   /  AST  17  /  ALT  30  /  AlkPhos  61  04-20    PT/INR - ( 19 Apr 2020 08:14 )   PT: 16.5 sec;   INR: 1.43          PTT - ( 19 Apr 2020 08:14 )  PTT:53.4 sec    CAPILLARY BLOOD GLUCOSE          RADIOLOGY & ADDITIONAL TESTS: Reviewed.

## 2020-04-21 LAB
ALBUMIN SERPL ELPH-MCNC: 2.7 G/DL — LOW (ref 3.3–5)
ALP SERPL-CCNC: 66 U/L — SIGNIFICANT CHANGE UP (ref 40–120)
ALT FLD-CCNC: 41 U/L — SIGNIFICANT CHANGE UP (ref 10–45)
ANA PAT FLD IF-IMP: ABNORMAL
ANA TITR SER: ABNORMAL
ANION GAP SERPL CALC-SCNC: 11 MMOL/L — SIGNIFICANT CHANGE UP (ref 5–17)
AST SERPL-CCNC: 25 U/L — SIGNIFICANT CHANGE UP (ref 10–40)
BASOPHILS # BLD AUTO: 0 K/UL — SIGNIFICANT CHANGE UP (ref 0–0.2)
BASOPHILS NFR BLD AUTO: 0 % — SIGNIFICANT CHANGE UP (ref 0–2)
BILIRUB SERPL-MCNC: 0.3 MG/DL — SIGNIFICANT CHANGE UP (ref 0.2–1.2)
BUN SERPL-MCNC: 16 MG/DL — SIGNIFICANT CHANGE UP (ref 7–23)
CALCIUM SERPL-MCNC: 8.8 MG/DL — SIGNIFICANT CHANGE UP (ref 8.4–10.5)
CHLORIDE SERPL-SCNC: 101 MMOL/L — SIGNIFICANT CHANGE UP (ref 96–108)
CO2 SERPL-SCNC: 26 MMOL/L — SIGNIFICANT CHANGE UP (ref 22–31)
CREAT SERPL-MCNC: 0.58 MG/DL — SIGNIFICANT CHANGE UP (ref 0.5–1.3)
CRP SERPL-MCNC: 2.98 MG/DL — HIGH (ref 0–0.4)
D DIMER BLD IA.RAPID-MCNC: <150 NG/ML DDU — SIGNIFICANT CHANGE UP
EOSINOPHIL # BLD AUTO: 0 K/UL — SIGNIFICANT CHANGE UP (ref 0–0.5)
EOSINOPHIL NFR BLD AUTO: 0 % — SIGNIFICANT CHANGE UP (ref 0–6)
FERRITIN SERPL-MCNC: 372 NG/ML — SIGNIFICANT CHANGE UP (ref 30–400)
GLUCOSE SERPL-MCNC: 130 MG/DL — HIGH (ref 70–99)
HCT VFR BLD CALC: 37.8 % — LOW (ref 39–50)
HGB BLD-MCNC: 12.1 G/DL — LOW (ref 13–17)
IMM GRANULOCYTES NFR BLD AUTO: 0.3 % — SIGNIFICANT CHANGE UP (ref 0–1.5)
LYMPHOCYTES # BLD AUTO: 0.33 K/UL — LOW (ref 1–3.3)
LYMPHOCYTES # BLD AUTO: 8.3 % — LOW (ref 13–44)
MAGNESIUM SERPL-MCNC: 1.8 MG/DL — SIGNIFICANT CHANGE UP (ref 1.6–2.6)
MCHC RBC-ENTMCNC: 29.2 PG — SIGNIFICANT CHANGE UP (ref 27–34)
MCHC RBC-ENTMCNC: 32 GM/DL — SIGNIFICANT CHANGE UP (ref 32–36)
MCV RBC AUTO: 91.1 FL — SIGNIFICANT CHANGE UP (ref 80–100)
MONOCYTES # BLD AUTO: 0.3 K/UL — SIGNIFICANT CHANGE UP (ref 0–0.9)
MONOCYTES NFR BLD AUTO: 7.6 % — SIGNIFICANT CHANGE UP (ref 2–14)
NEUTROPHILS # BLD AUTO: 3.32 K/UL — SIGNIFICANT CHANGE UP (ref 1.8–7.4)
NEUTROPHILS NFR BLD AUTO: 83.8 % — HIGH (ref 43–77)
NRBC # BLD: 0 /100 WBCS — SIGNIFICANT CHANGE UP (ref 0–0)
PHOSPHATE SERPL-MCNC: 3.3 MG/DL — SIGNIFICANT CHANGE UP (ref 2.5–4.5)
PLATELET # BLD AUTO: 100 K/UL — LOW (ref 150–400)
POTASSIUM SERPL-MCNC: 4.2 MMOL/L — SIGNIFICANT CHANGE UP (ref 3.5–5.3)
POTASSIUM SERPL-SCNC: 4.2 MMOL/L — SIGNIFICANT CHANGE UP (ref 3.5–5.3)
PROT SERPL-MCNC: 6.4 G/DL — SIGNIFICANT CHANGE UP (ref 6–8.3)
RBC # BLD: 4.15 M/UL — LOW (ref 4.2–5.8)
RBC # FLD: 14.4 % — SIGNIFICANT CHANGE UP (ref 10.3–14.5)
SARS-COV-2 RNA SPEC QL NAA+PROBE: DETECTED
SODIUM SERPL-SCNC: 138 MMOL/L — SIGNIFICANT CHANGE UP (ref 135–145)
WBC # BLD: 3.96 K/UL — SIGNIFICANT CHANGE UP (ref 3.8–10.5)
WBC # FLD AUTO: 3.96 K/UL — SIGNIFICANT CHANGE UP (ref 3.8–10.5)

## 2020-04-21 PROCEDURE — 99222 1ST HOSP IP/OBS MODERATE 55: CPT | Mod: CS

## 2020-04-21 PROCEDURE — 99233 SBSQ HOSP IP/OBS HIGH 50: CPT | Mod: CS,GC

## 2020-04-21 RX ORDER — PREGABALIN 225 MG/1
1000 CAPSULE ORAL ONCE
Refills: 0 | Status: COMPLETED | OUTPATIENT
Start: 2020-04-21 | End: 2020-04-21

## 2020-04-21 RX ADMIN — ALBUTEROL 2 PUFF(S): 90 AEROSOL, METERED ORAL at 17:07

## 2020-04-21 RX ADMIN — FAMOTIDINE 20 MILLIGRAM(S): 10 INJECTION INTRAVENOUS at 06:00

## 2020-04-21 RX ADMIN — Medication 1 DROP(S): at 14:15

## 2020-04-21 RX ADMIN — ENOXAPARIN SODIUM 80 MILLIGRAM(S): 100 INJECTION SUBCUTANEOUS at 06:00

## 2020-04-21 RX ADMIN — ENOXAPARIN SODIUM 80 MILLIGRAM(S): 100 INJECTION SUBCUTANEOUS at 17:07

## 2020-04-21 RX ADMIN — ALBUTEROL 2 PUFF(S): 90 AEROSOL, METERED ORAL at 23:49

## 2020-04-21 RX ADMIN — ALBUTEROL 2 PUFF(S): 90 AEROSOL, METERED ORAL at 06:00

## 2020-04-21 RX ADMIN — Medication 80 MILLIGRAM(S): at 06:00

## 2020-04-21 RX ADMIN — Medication 1 DROP(S): at 06:00

## 2020-04-21 RX ADMIN — ALBUTEROL 2 PUFF(S): 90 AEROSOL, METERED ORAL at 11:02

## 2020-04-21 RX ADMIN — FAMOTIDINE 20 MILLIGRAM(S): 10 INJECTION INTRAVENOUS at 17:07

## 2020-04-21 RX ADMIN — Medication 100 MILLIGRAM(S): at 13:04

## 2020-04-21 RX ADMIN — Medication 100 MILLIGRAM(S): at 21:23

## 2020-04-21 RX ADMIN — TAMSULOSIN HYDROCHLORIDE 0.4 MILLIGRAM(S): 0.4 CAPSULE ORAL at 21:23

## 2020-04-21 RX ADMIN — PREGABALIN 1000 MICROGRAM(S): 225 CAPSULE ORAL at 17:07

## 2020-04-21 RX ADMIN — Medication 25 MILLIGRAM(S): at 06:00

## 2020-04-21 NOTE — PROGRESS NOTE ADULT - ASSESSMENT
81 yo M h/o BPH, HLD presented 4/2 with fever x 3 days. Intubated on admission, extubated on 4/4. s/p Plaquenil/azithromycin, Toci and five day steroid course (ended on 4/7). Course complicated by afib with RVR, now rate controlled on toprol. Patient is currently being treated for acute resp failure secondary to covid infection on 10L NRB satting 94%.

## 2020-04-21 NOTE — PROGRESS NOTE ADULT - PROBLEM SELECTOR PLAN 5
Currently with hypotension and BPs with systolic low 100s currently on Midodrine 2.5 mg TID.   - midodrine discontinued

## 2020-04-21 NOTE — CONSULT NOTE ADULT - ATTENDING COMMENTS
Seen and examined by me. Feels markedly better w Covid, but still requiring NRB. Has been put on prednisone - can continue that for now but uncertain utility of steroids in Covid. No evident other process. Fully anticoagulated. Will continue to follow w you.

## 2020-04-21 NOTE — CONSULT NOTE ADULT - PROBLEM SELECTOR RECOMMENDATION 9
Seemingly improving, albeit slowly. He was intubated on 4/4 and extubated two days later. Since then, most of his improvement has been with respect to other viral sx from COVID-19 though he has been able to be more mobile/active in the hospital despite dependence on NRB. He has been out of bed to chair, and is interested in increasing activity to ambulation within the limitations of hospital room. Was started on high dose steroids yesterday; though it is not clear if there is a clear indication for this in COVID-19 infection, particularly persistent respiratory failure nearly 3 weeks beyond initial infection.     Recommendations:  - cont best supportive care in COVID-19 infection  - would try weaning to 6LNC daily; it is unclear if there is a role for using NRB facemask at anything less than 10-15LPM for purposes of weaning (lower NRB flow rates allow for less O2 enrichment, higher O2% "dilution" by entrained ambient air)   - encourage mobility and ambulation in the room (provide O2 extension tubing so he can walk in the room on NRB for example), continue out of bed to chair otherwise throughout day, continue incentive spirometry  - can continue pred for now but again, unclear if this is of benefit/utility for COVID-19  - cont full AC for his afib

## 2020-04-21 NOTE — CONSULT NOTE ADULT - SUBJECTIVE AND OBJECTIVE BOX
PULMONARY SERVICE INITIAL CONSULT NOTE***INCOMPLETE    HPI:  81 y/o M with HTN, HLD, and BPH presenting with fevers and cough of 7-8 days duration, and diarrhea of one days duration. Patient notified PCP (Dr. Nunez) of fevers and cough on 3/27-3/28. Per wife, patient Temperatures 101-105, managed with Tylenol. PCP aware and informed and prescribed azithromycin and hydroxychloroquine (3/29), per wife completed by patient. Patient purchased pulse-ox was saturating 95-96% on Monday, however without improvement in symptoms and was re-prescribed hydroxychloroquine, but did not take any extra doses. Patient began having uncontrollable diarrhea one day prior to presentation. This morning patient saturating 84-86%, PCP instructed patient to report to ED, intubated on arrival for hypoxic respiratory failure and noted to be in new onset atrial fibrillation with RVR. No known sick contacts. Travelled to [facility] 2/16. Per wife and PCP denies SOB, abdominal pain, nausea, vomiting or palpations. ROS positive for fever, cough, and diarrhea.     Date of onset of fever: 3/28-3/29 (T: 102F-105F)  Date of onset of dyspnea: denies  Recent Travel: [facility] 2/16  Sick Contacts: None  COVID Exposure: None  Close Contacts: None    ROS (per wife and PCP):   Fevers: Y  Malaise: Y  Myalgias: N  SOB: N          At rest: N         With exertion: N         At baseline: N  Cough: Y         Productive: unclear   Nausea: N  Vomiting: N  Diarrhea: Y (1 days duration)    PMHx:   HTN: Y  DM: N  Lung Disease: N       Specify:  Cardiovascular disease: N  Malignancy: N  Immunosuppression: N  HIV: N  CKD/ESRD: N  Chronic Liver Disease: N    In ED: (02 Apr 2020 13:19)    REVIEW OF SYSTEMS:  Constitutional: No fever, weight loss or fatigue  Eyes: No eye pain, visual disturbances, or discharge  ENMT:  No difficulty hearing, tinnitus, vertigo; No sinus or throat pain  Neck: No pain, stiffness or neck swelling  Respiratory: see HPI  Cardiovascular: No chest pain, palpitations, dizziness or leg swelling  Gastrointestinal: No abdominal or epigastric pain. No nausea, vomiting or hematemesis; No diarrhea or constipation. No melena or hematochezia.  Genitourinary: No dysuria, frequency, hematuria or incontinence  Neurological: No headaches, memory loss, loss of strength, numbness or tremors  Skin: No itching, burning, rashes or lesions   Lymph Nodes: No enlarged glands  Endocrine: No heat or cold intolerance; No hair loss  Musculoskeletal: No joint pain or swelling; No muscle, back or extremity pain  Psychiatric: No depression, anxiety, mood swings or difficulty sleeping  Heme/Lymph: No easy bruising or bleeding gums  Allergy and Immunologic: No hives or eczema    PAST MEDICAL & SURGICAL HISTORY:  BPH (benign prostatic hyperplasia)  HLD (hyperlipidemia)  BPH (benign prostatic hyperplasia)  Hypertension  No significant past surgical history    FAMILY HISTORY:  No pertinent family history in first degree relatives    SOCIAL HISTORY:  Smoking Status: [ ] Current, [ ] Former, [X] Never  Pack Years:    MEDICATIONS:  Pulmonary:  ALBUTerol    90 MICROgram(s) HFA Inhaler 2 Puff(s) Inhalation every 6 hours  benzonatate 100 milliGRAM(s) Oral every 8 hours    Antimicrobials:    Anticoagulants:  enoxaparin Injectable 80 milliGRAM(s) SubCutaneous every 12 hours    Onc:    GI/:  famotidine    Tablet 20 milliGRAM(s) Oral two times a day    Endocrine:  dextrose 40% Gel 15 Gram(s) Oral once PRN  dextrose 50% Injectable 12.5 Gram(s) IV Push once  dextrose 50% Injectable 25 Gram(s) IV Push once  dextrose 50% Injectable 25 Gram(s) IV Push once  glucagon  Injectable 1 milliGRAM(s) IntraMuscular once PRN  predniSONE   Tablet 80 milliGRAM(s) Oral every 24 hours    Cardiac:  metoprolol succinate ER 25 milliGRAM(s) Oral daily  tamsulosin 0.4 milliGRAM(s) Oral at bedtime    Other Medications:  acetaminophen    Suspension .. 650 milliGRAM(s) Enteral Tube every 6 hours PRN  artificial tears (preservative free) Ophthalmic Solution 1 Drop(s) Both EYES four times a day PRN  benzocaine 15 mG/menthol 3.6 mG (Sugar-Free) Lozenge 1 Lozenge Oral three times a day PRN  dextrose 5%. 1000 milliLiter(s) IV Continuous <Continuous>    Allergies    No Known Allergies    Intolerances    Vital Signs Last 24 Hrs  T(C): 36.6 (21 Apr 2020 17:06), Max: 36.6 (21 Apr 2020 11:01)  T(F): 97.9 (21 Apr 2020 17:06), Max: 97.9 (21 Apr 2020 17:06)  HR: 63 (21 Apr 2020 17:06) (63 - 66)  BP: 124/67 (21 Apr 2020 17:06) (108/63 - 144/67)  BP(mean): --  RR: 19 (21 Apr 2020 17:06) (18 - 20)  SpO2: 93% (21 Apr 2020 17:06) (93% - 95%)    04-20 @ 07:01  -  04-21 @ 07:00  --------------------------------------------------------  IN: 1095 mL / OUT: 1075 mL / NET: 20 mL    04-21 @ 07:01  -  04-21 @ 18:08  --------------------------------------------------------  IN: 720 mL / OUT: 600 mL / NET: 120 mL    PHYSICAL EXAM:  Constitutional: WDWN  Head: NC/AT  EENT: PERRL, anicteric sclera; oropharynx clear, MMM  Neck: supple, no appreciable JVD  Respiratory: CTA B/L; no W/R/R  Cardiovascular: +S1/S2, RRR  Gastrointestinal: soft, NT/ND; +BSx4  Extremities: WWP; no edema, clubbing or cyanosis  Vascular: 2+ radial, DP/PT pulses B/L  Neurological: AAOx3; no focal deficits    LABS:  CBC Full  -  ( 21 Apr 2020 06:52 )  WBC Count : 3.96 K/uL  RBC Count : 4.15 M/uL  Hemoglobin : 12.1 g/dL  Hematocrit : 37.8 %  Platelet Count - Automated : 100 K/uL  Mean Cell Volume : 91.1 fl  Mean Cell Hemoglobin : 29.2 pg  Mean Cell Hemoglobin Concentration : 32.0 gm/dL  Auto Neutrophil # : 3.32 K/uL  Auto Lymphocyte # : 0.33 K/uL  Auto Monocyte # : 0.30 K/uL  Auto Eosinophil # : 0.00 K/uL  Auto Basophil # : 0.00 K/uL  Auto Neutrophil % : 83.8 %  Auto Lymphocyte % : 8.3 %  Auto Monocyte % : 7.6 %  Auto Eosinophil % : 0.0 %  Auto Basophil % : 0.0 %    04-21    138  |  101  |  16  ----------------------------<  130<H>  4.2   |  26  |  0.58    Ca    8.8      21 Apr 2020 06:52  Phos  3.3     04-21  Mg     1.8     04-21    TPro  6.4  /  Alb  2.7<L>  /  TBili  0.3  /  DBili  x   /  AST  25  /  ALT  41  /  AlkPhos  66  04-21    RADIOLOGY & ADDITIONAL STUDIES: PULMONARY SERVICE INITIAL CONSULT NOTE    HPI:  79 y/o M with HTN, HLD, and BPH presenting with fevers and cough of 7-8 days duration, and diarrhea of one days duration. Patient notified PCP (Dr. Nunez) of fevers and cough on 3/27-3/28. Per wife, patient Temperatures 101-105, managed with Tylenol. PCP aware and informed and prescribed azithromycin and hydroxychloroquine (3/29), per wife completed by patient. Patient purchased pulse-ox was saturating 95-96% on Monday, however without improvement in symptoms and was re-prescribed hydroxychloroquine, but did not take any extra doses. Patient began having uncontrollable diarrhea one day prior to presentation. This morning patient saturating 84-86%, PCP instructed patient to report to ED, intubated on arrival for hypoxic respiratory failure and noted to be in new onset atrial fibrillation with RVR. No known sick contacts. Travelled to [facility] 2/16. Per wife and PCP denies SOB, abdominal pain, nausea, vomiting or palpations. ROS positive for fever, cough, and diarrhea.     Date of onset of fever: 3/28-3/29 (T: 102F-105F)  Date of onset of dyspnea: denies  Recent Travel: [facility] 2/16  Sick Contacts: None  COVID Exposure: None  Close Contacts: None    ROS (per wife and PCP):   Fevers: Y  Malaise: Y  Myalgias: N  SOB: N          At rest: N         With exertion: N         At baseline: N  Cough: Y         Productive: unclear   Nausea: N  Vomiting: N  Diarrhea: Y (1 days duration)    PMHx:   HTN: Y  DM: N  Lung Disease: N       Specify:  Cardiovascular disease: N  Malignancy: N  Immunosuppression: N  HIV: N  CKD/ESRD: N  Chronic Liver Disease: N    In ED: (02 Apr 2020 13:19)    INTERVAL HPI:  Asked to evaluate patient for persistent hypoxemic respiratory failure in the setting of COVID-19 infection. He continues to feel much better than how he did in the preceding 2.5 weeks. Feels his strength and energy are slowly returning, though still somewhat limited by oxygen needs. Leads active lifestyle otherwise, swims for 2 hours a day when traveling in [place] and otherwise active in all ADLs. Wife also diagnosed with COVID and possibly has new diagnosis of bladder CA.     REVIEW OF SYSTEMS:  Constitutional: No fever, weight loss or fatigue  Eyes: No eye pain, visual disturbances, or discharge  ENMT:  No difficulty hearing, tinnitus, vertigo; No sinus or throat pain  Neck: No pain, stiffness or neck swelling  Respiratory: see HPI  Cardiovascular: No chest pain, palpitations, dizziness or leg swelling  Gastrointestinal: No abdominal or epigastric pain. No nausea, vomiting or hematemesis; No diarrhea or constipation. No melena or hematochezia.  Genitourinary: No dysuria, frequency, hematuria or incontinence  Neurological: No headaches, memory loss, loss of strength, numbness or tremors  Skin: No itching, burning, rashes or lesions   Lymph Nodes: No enlarged glands  Endocrine: No heat or cold intolerance; No hair loss  Musculoskeletal: No joint pain or swelling; No muscle, back or extremity pain  Psychiatric: No depression, anxiety, mood swings or difficulty sleeping  Heme/Lymph: No easy bruising or bleeding gums  Allergy and Immunologic: No hives or eczema    PAST MEDICAL & SURGICAL HISTORY:  BPH (benign prostatic hyperplasia)  HLD (hyperlipidemia)  BPH (benign prostatic hyperplasia)  Hypertension  No significant past surgical history    FAMILY HISTORY:  No pertinent family history in first degree relatives    SOCIAL HISTORY:  Smoking Status: [ ] Current, [ ] Former, [X] Never  Pack Years:    MEDICATIONS:  Pulmonary:  ALBUTerol    90 MICROgram(s) HFA Inhaler 2 Puff(s) Inhalation every 6 hours  benzonatate 100 milliGRAM(s) Oral every 8 hours    Antimicrobials:    Anticoagulants:  enoxaparin Injectable 80 milliGRAM(s) SubCutaneous every 12 hours    Onc:    GI/:  famotidine    Tablet 20 milliGRAM(s) Oral two times a day    Endocrine:  dextrose 40% Gel 15 Gram(s) Oral once PRN  dextrose 50% Injectable 12.5 Gram(s) IV Push once  dextrose 50% Injectable 25 Gram(s) IV Push once  dextrose 50% Injectable 25 Gram(s) IV Push once  glucagon  Injectable 1 milliGRAM(s) IntraMuscular once PRN  predniSONE   Tablet 80 milliGRAM(s) Oral every 24 hours    Cardiac:  metoprolol succinate ER 25 milliGRAM(s) Oral daily  tamsulosin 0.4 milliGRAM(s) Oral at bedtime    Other Medications:  acetaminophen    Suspension .. 650 milliGRAM(s) Enteral Tube every 6 hours PRN  artificial tears (preservative free) Ophthalmic Solution 1 Drop(s) Both EYES four times a day PRN  benzocaine 15 mG/menthol 3.6 mG (Sugar-Free) Lozenge 1 Lozenge Oral three times a day PRN  dextrose 5%. 1000 milliLiter(s) IV Continuous <Continuous>    Allergies    No Known Allergies    Intolerances    Vital Signs Last 24 Hrs  T(C): 36.6 (21 Apr 2020 17:06), Max: 36.6 (21 Apr 2020 11:01)  T(F): 97.9 (21 Apr 2020 17:06), Max: 97.9 (21 Apr 2020 17:06)  HR: 63 (21 Apr 2020 17:06) (63 - 66)  BP: 124/67 (21 Apr 2020 17:06) (108/63 - 144/67)  BP(mean): --  RR: 19 (21 Apr 2020 17:06) (18 - 20)  SpO2: 93% (21 Apr 2020 17:06) (93% - 95%)    04-20 @ 07:01  -  04-21 @ 07:00  --------------------------------------------------------  IN: 1095 mL / OUT: 1075 mL / NET: 20 mL    04-21 @ 07:01  -  04-21 @ 18:08  --------------------------------------------------------  IN: 720 mL / OUT: 600 mL / NET: 120 mL    PHYSICAL EXAM:  Constitutional: WDWN man appears younger than stated age, sitting comfortably in chair  Head: NC/AT  EENT: PERRL, anicteric sclera; oropharynx clear, MMM  Neck: supple, no appreciable JVD  Respiratory: scattered bilateral crackles extending from bases to mid-lung fields; lungs otherwise clear; no increased WOB  Cardiovascular: +S1/S2, RRR  Gastrointestinal: soft, NT/ND  Extremities: WWP; no edema, clubbing or cyanosis  Vascular: 2+ radial pulses B/L  Neurological: awake and alert; conversive, follows commands    LABS:  CBC Full  -  ( 21 Apr 2020 06:52 )  WBC Count : 3.96 K/uL  RBC Count : 4.15 M/uL  Hemoglobin : 12.1 g/dL  Hematocrit : 37.8 %  Platelet Count - Automated : 100 K/uL  Mean Cell Volume : 91.1 fl  Mean Cell Hemoglobin : 29.2 pg  Mean Cell Hemoglobin Concentration : 32.0 gm/dL  Auto Neutrophil # : 3.32 K/uL  Auto Lymphocyte # : 0.33 K/uL  Auto Monocyte # : 0.30 K/uL  Auto Eosinophil # : 0.00 K/uL  Auto Basophil # : 0.00 K/uL  Auto Neutrophil % : 83.8 %  Auto Lymphocyte % : 8.3 %  Auto Monocyte % : 7.6 %  Auto Eosinophil % : 0.0 %  Auto Basophil % : 0.0 %    04-21    138  |  101  |  16  ----------------------------<  130<H>  4.2   |  26  |  0.58    Ca    8.8      21 Apr 2020 06:52  Phos  3.3     04-21  Mg     1.8     04-21    TPro  6.4  /  Alb  2.7<L>  /  TBili  0.3  /  DBili  x   /  AST  25  /  ALT  41  /  AlkPhos  66  04-21    RADIOLOGY & ADDITIONAL STUDIES:  Personally reviewed CXRs and CT chest; persistent bilateral ground-glass consolidative opacities throughout

## 2020-04-21 NOTE — CONSULT NOTE ADULT - ASSESSMENT
79yo man never smoker admitted 4/2 for acute hypoxemic respiratory failure 2/2 SARS-CoV-2 infection 79yo man never smoker admitted 4/2 for acute hypoxemic respiratory failure 2/2 SARS-CoV-2 infection. Pulmonary consulted for mgmt recs for persistent hypoxemic respiratory failure.

## 2020-04-21 NOTE — PROGRESS NOTE ADULT - PROBLEM SELECTOR PLAN 2
S/p COVID bundle: azithromycin, hydroxychloroquine, tociluzumab 4/2, Prednisone 80mg qD. CRP downtrending   -c/w prednisone 80mg qD (4/20-)  - Tylenol PRN for fever  - Robitussin PRN for cough  - no nebulizers, MDI only  - COVID Isolation precautions: contact and droplet   - continue to monitor daily CBC w/ differential, CMP, Mg, Phos, CRP, ferritin, and D-dimer  - f/u repeat covid-19 PCR

## 2020-04-21 NOTE — PROGRESS NOTE ADULT - SUBJECTIVE AND OBJECTIVE BOX
OVERNIGHT EVENTS: BIANCA    SUBJECTIVE / INTERVAL HPI: Patient seen and examined at bedside. Patient resting comfortably in bed on 13L NRB. He says he has a persistent cough which has made it difficult for him to sleep. Denies any dyspnea or SOB. Otherwise 12 point ROS is negative.     VITAL SIGNS:  Vital Signs Last 24 Hrs  T(C): 36.3 (21 Apr 2020 04:41), Max: 36.3 (20 Apr 2020 16:21)  T(F): 97.4 (21 Apr 2020 04:41), Max: 97.4 (20 Apr 2020 22:07)  HR: 66 (21 Apr 2020 04:41) (63 - 67)  BP: 119/71 (21 Apr 2020 04:41) (105/66 - 144/67)  BP(mean): --  RR: 18 (21 Apr 2020 04:41) (18 - 18)  SpO2: 93% (21 Apr 2020 04:41) (92% - 95%)  I&O's Summary    20 Apr 2020 07:01  -  21 Apr 2020 07:00  --------------------------------------------------------  IN: 1095 mL / OUT: 1075 mL / NET: 20 mL        PHYSICAL EXAM:    General: WDWN, on 10L NRB  HEENT: NC/AT; PERRL, anicteric sclera; MMM  Neck: supple, no jvd  Cardiovascular: +S1/S2; RRR  Respiratory: Crackles appreciated, worse on R. side then left.   Gastrointestinal: soft, NT/ND; +BSx4  Extremities: WWP; no edema, clubbing or cyanosis  Vascular: 2+ radial, DP/PT pulses B/L  Neurological: AAOx3; no focal deficits    MEDICATIONS:  MEDICATIONS  (STANDING):  ALBUTerol    90 MICROgram(s) HFA Inhaler 2 Puff(s) Inhalation every 6 hours  benzonatate 100 milliGRAM(s) Oral every 8 hours  dextrose 5%. 1000 milliLiter(s) (50 mL/Hr) IV Continuous <Continuous>  dextrose 50% Injectable 12.5 Gram(s) IV Push once  dextrose 50% Injectable 25 Gram(s) IV Push once  dextrose 50% Injectable 25 Gram(s) IV Push once  enoxaparin Injectable 80 milliGRAM(s) SubCutaneous every 12 hours  famotidine    Tablet 20 milliGRAM(s) Oral two times a day  metoprolol succinate ER 25 milliGRAM(s) Oral daily  predniSONE   Tablet 80 milliGRAM(s) Oral every 24 hours  tamsulosin 0.4 milliGRAM(s) Oral at bedtime    MEDICATIONS  (PRN):  acetaminophen    Suspension .. 650 milliGRAM(s) Enteral Tube every 6 hours PRN Temp greater or equal to 38C (100.4F), Moderate Pain (4 - 6)  artificial tears (preservative free) Ophthalmic Solution 1 Drop(s) Both EYES four times a day PRN Dry Eyes  benzocaine 15 mG/menthol 3.6 mG (Sugar-Free) Lozenge 1 Lozenge Oral three times a day PRN cough  dextrose 40% Gel 15 Gram(s) Oral once PRN Blood Glucose LESS THAN 70 milliGRAM(s)/deciliter  glucagon  Injectable 1 milliGRAM(s) IntraMuscular once PRN Glucose LESS THAN 70 milligrams/deciliter      ALLERGIES:  Allergies    No Known Allergies    Intolerances        LABS:                        12.1   3.96  )-----------( 100      ( 21 Apr 2020 06:52 )             37.8     04-21    138  |  101  |  16  ----------------------------<  130<H>  4.2   |  26  |  0.58    Ca    8.8      21 Apr 2020 06:52  Phos  3.3     04-21  Mg     1.8     04-21    TPro  6.4  /  Alb  2.7<L>  /  TBili  0.3  /  DBili  x   /  AST  25  /  ALT  41  /  AlkPhos  66  04-21        CAPILLARY BLOOD GLUCOSE          RADIOLOGY & ADDITIONAL TESTS: Reviewed.

## 2020-04-22 DIAGNOSIS — J96.01 ACUTE RESPIRATORY FAILURE WITH HYPOXIA: ICD-10-CM

## 2020-04-22 DIAGNOSIS — U07.1 COVID-19: ICD-10-CM

## 2020-04-22 LAB
ALBUMIN SERPL ELPH-MCNC: 2.8 G/DL — LOW (ref 3.3–5)
ALP SERPL-CCNC: 78 U/L — SIGNIFICANT CHANGE UP (ref 40–120)
ALT FLD-CCNC: 99 U/L — HIGH (ref 10–45)
ANION GAP SERPL CALC-SCNC: 10 MMOL/L — SIGNIFICANT CHANGE UP (ref 5–17)
AST SERPL-CCNC: 78 U/L — HIGH (ref 10–40)
BASOPHILS # BLD AUTO: 0.01 K/UL — SIGNIFICANT CHANGE UP (ref 0–0.2)
BASOPHILS NFR BLD AUTO: 0.2 % — SIGNIFICANT CHANGE UP (ref 0–2)
BILIRUB SERPL-MCNC: 0.3 MG/DL — SIGNIFICANT CHANGE UP (ref 0.2–1.2)
BUN SERPL-MCNC: 17 MG/DL — SIGNIFICANT CHANGE UP (ref 7–23)
CALCIUM SERPL-MCNC: 8.9 MG/DL — SIGNIFICANT CHANGE UP (ref 8.4–10.5)
CHLORIDE SERPL-SCNC: 102 MMOL/L — SIGNIFICANT CHANGE UP (ref 96–108)
CO2 SERPL-SCNC: 28 MMOL/L — SIGNIFICANT CHANGE UP (ref 22–31)
CREAT SERPL-MCNC: 0.72 MG/DL — SIGNIFICANT CHANGE UP (ref 0.5–1.3)
CRP SERPL-MCNC: 1.25 MG/DL — HIGH (ref 0–0.4)
D DIMER BLD IA.RAPID-MCNC: <150 NG/ML DDU — SIGNIFICANT CHANGE UP
EOSINOPHIL # BLD AUTO: 0.02 K/UL — SIGNIFICANT CHANGE UP (ref 0–0.5)
EOSINOPHIL NFR BLD AUTO: 0.4 % — SIGNIFICANT CHANGE UP (ref 0–6)
FERRITIN SERPL-MCNC: 350 NG/ML — SIGNIFICANT CHANGE UP (ref 30–400)
GLUCOSE SERPL-MCNC: 99 MG/DL — SIGNIFICANT CHANGE UP (ref 70–99)
HCT VFR BLD CALC: 37.2 % — LOW (ref 39–50)
HGB BLD-MCNC: 11.9 G/DL — LOW (ref 13–17)
IMM GRANULOCYTES NFR BLD AUTO: 0.7 % — SIGNIFICANT CHANGE UP (ref 0–1.5)
LYMPHOCYTES # BLD AUTO: 0.66 K/UL — LOW (ref 1–3.3)
LYMPHOCYTES # BLD AUTO: 11.8 % — LOW (ref 13–44)
MAGNESIUM SERPL-MCNC: 1.9 MG/DL — SIGNIFICANT CHANGE UP (ref 1.6–2.6)
MCHC RBC-ENTMCNC: 29.1 PG — SIGNIFICANT CHANGE UP (ref 27–34)
MCHC RBC-ENTMCNC: 32 GM/DL — SIGNIFICANT CHANGE UP (ref 32–36)
MCV RBC AUTO: 91 FL — SIGNIFICANT CHANGE UP (ref 80–100)
MONOCYTES # BLD AUTO: 0.88 K/UL — SIGNIFICANT CHANGE UP (ref 0–0.9)
MONOCYTES NFR BLD AUTO: 15.7 % — HIGH (ref 2–14)
NEUTROPHILS # BLD AUTO: 3.98 K/UL — SIGNIFICANT CHANGE UP (ref 1.8–7.4)
NEUTROPHILS NFR BLD AUTO: 71.2 % — SIGNIFICANT CHANGE UP (ref 43–77)
NRBC # BLD: 0 /100 WBCS — SIGNIFICANT CHANGE UP (ref 0–0)
PHOSPHATE SERPL-MCNC: 2.3 MG/DL — LOW (ref 2.5–4.5)
PLATELET # BLD AUTO: 99 K/UL — LOW (ref 150–400)
POTASSIUM SERPL-MCNC: 4.6 MMOL/L — SIGNIFICANT CHANGE UP (ref 3.5–5.3)
POTASSIUM SERPL-SCNC: 4.6 MMOL/L — SIGNIFICANT CHANGE UP (ref 3.5–5.3)
PROT SERPL-MCNC: 6.7 G/DL — SIGNIFICANT CHANGE UP (ref 6–8.3)
RBC # BLD: 4.09 M/UL — LOW (ref 4.2–5.8)
RBC # FLD: 14.6 % — HIGH (ref 10.3–14.5)
SODIUM SERPL-SCNC: 140 MMOL/L — SIGNIFICANT CHANGE UP (ref 135–145)
SRA INTERP SER-IMP: SIGNIFICANT CHANGE UP
WBC # BLD: 5.59 K/UL — SIGNIFICANT CHANGE UP (ref 3.8–10.5)
WBC # FLD AUTO: 5.59 K/UL — SIGNIFICANT CHANGE UP (ref 3.8–10.5)

## 2020-04-22 PROCEDURE — 99233 SBSQ HOSP IP/OBS HIGH 50: CPT | Mod: CS,GC

## 2020-04-22 RX ADMIN — ALBUTEROL 2 PUFF(S): 90 AEROSOL, METERED ORAL at 22:10

## 2020-04-22 RX ADMIN — Medication 100 MILLIGRAM(S): at 05:35

## 2020-04-22 RX ADMIN — TAMSULOSIN HYDROCHLORIDE 0.4 MILLIGRAM(S): 0.4 CAPSULE ORAL at 22:07

## 2020-04-22 RX ADMIN — FAMOTIDINE 20 MILLIGRAM(S): 10 INJECTION INTRAVENOUS at 16:07

## 2020-04-22 RX ADMIN — FAMOTIDINE 20 MILLIGRAM(S): 10 INJECTION INTRAVENOUS at 05:33

## 2020-04-22 RX ADMIN — ENOXAPARIN SODIUM 80 MILLIGRAM(S): 100 INJECTION SUBCUTANEOUS at 16:07

## 2020-04-22 RX ADMIN — ENOXAPARIN SODIUM 80 MILLIGRAM(S): 100 INJECTION SUBCUTANEOUS at 05:35

## 2020-04-22 RX ADMIN — Medication 100 MILLIGRAM(S): at 22:07

## 2020-04-22 RX ADMIN — ALBUTEROL 2 PUFF(S): 90 AEROSOL, METERED ORAL at 05:49

## 2020-04-22 RX ADMIN — Medication 100 MILLIGRAM(S): at 16:07

## 2020-04-22 RX ADMIN — ALBUTEROL 2 PUFF(S): 90 AEROSOL, METERED ORAL at 12:43

## 2020-04-22 RX ADMIN — ALBUTEROL 2 PUFF(S): 90 AEROSOL, METERED ORAL at 16:02

## 2020-04-22 RX ADMIN — Medication 80 MILLIGRAM(S): at 05:35

## 2020-04-22 NOTE — PROGRESS NOTE ADULT - SUBJECTIVE AND OBJECTIVE BOX
OVERNIGHT EVENTS: BIANCA    SUBJECTIVE / INTERVAL HPI: Patient seen and examined at bedside. Patient seen with NRB mask off while eating. O2 sat was 94%, transitioned to 6L NC. Patient says he is anxious that he has not improved yet, and that he may need a camera placed down his throat. Otherwise, he says his cough persists, but denies SOB while on the oxygen. 12 point ROS otherwise negative.     VITAL SIGNS:  Vital Signs Last 24 Hrs  T(C): 36.3 (22 Apr 2020 08:28), Max: 36.8 (22 Apr 2020 05:17)  T(F): 97.4 (22 Apr 2020 08:28), Max: 98.3 (22 Apr 2020 05:17)  HR: 63 (22 Apr 2020 08:28) (60 - 70)  BP: 109/62 (22 Apr 2020 08:28) (105/56 - 124/67)  BP(mean): --  RR: 18 (22 Apr 2020 09:11) (15 - 21)  SpO2: 93% (22 Apr 2020 09:11) (93% - 96%)  I&O's Summary    21 Apr 2020 07:01  -  22 Apr 2020 07:00  --------------------------------------------------------  IN: 720 mL / OUT: 850 mL / NET: -130 mL        PHYSICAL EXAM:    General: WDWN, elderly male  HEENT: NC/AT; PERRL, anicteric sclera; MMM  Neck: supple, no jvd  Cardiovascular: +S1/S2; RRR  Respiratory: course crackles worse on the R than the L.   Gastrointestinal: soft, NT/ND; +BSx4  Extremities: WWP; no edema, clubbing or cyanosis  Vascular: 2+ radial, DP/PT pulses B/L  Neurological: AAOx3; no focal deficits    MEDICATIONS:  MEDICATIONS  (STANDING):  ALBUTerol    90 MICROgram(s) HFA Inhaler 2 Puff(s) Inhalation every 6 hours  benzonatate 100 milliGRAM(s) Oral every 8 hours  dextrose 5%. 1000 milliLiter(s) (50 mL/Hr) IV Continuous <Continuous>  dextrose 50% Injectable 12.5 Gram(s) IV Push once  dextrose 50% Injectable 25 Gram(s) IV Push once  dextrose 50% Injectable 25 Gram(s) IV Push once  enoxaparin Injectable 80 milliGRAM(s) SubCutaneous every 12 hours  famotidine    Tablet 20 milliGRAM(s) Oral two times a day  metoprolol succinate ER 25 milliGRAM(s) Oral daily  predniSONE   Tablet 80 milliGRAM(s) Oral every 24 hours  tamsulosin 0.4 milliGRAM(s) Oral at bedtime    MEDICATIONS  (PRN):  acetaminophen    Suspension .. 650 milliGRAM(s) Enteral Tube every 6 hours PRN Temp greater or equal to 38C (100.4F), Moderate Pain (4 - 6)  artificial tears (preservative free) Ophthalmic Solution 1 Drop(s) Both EYES four times a day PRN Dry Eyes  benzocaine 15 mG/menthol 3.6 mG (Sugar-Free) Lozenge 1 Lozenge Oral three times a day PRN cough  dextrose 40% Gel 15 Gram(s) Oral once PRN Blood Glucose LESS THAN 70 milliGRAM(s)/deciliter  glucagon  Injectable 1 milliGRAM(s) IntraMuscular once PRN Glucose LESS THAN 70 milligrams/deciliter      ALLERGIES:  Allergies    No Known Allergies    Intolerances        LABS:                        11.9   5.59  )-----------( 99       ( 22 Apr 2020 07:31 )             37.2     04-22    140  |  102  |  17  ----------------------------<  99  4.6   |  28  |  0.72    Ca    8.9      22 Apr 2020 07:31  Phos  2.3     04-22  Mg     1.9     04-22    TPro  6.7  /  Alb  2.8<L>  /  TBili  0.3  /  DBili  x   /  AST  78<H>  /  ALT  99<H>  /  AlkPhos  78  04-22        CAPILLARY BLOOD GLUCOSE          RADIOLOGY & ADDITIONAL TESTS: Reviewed.

## 2020-04-22 NOTE — PROGRESS NOTE ADULT - PROBLEM SELECTOR PLAN 2
S/p COVID bundle: azithromycin, hydroxychloroquine, tociluzumab 4/2, Prednisone 80mg qD. CRP downtrending   -c/w prednisone 80mg qD (4/20-)  - Tylenol PRN for fever  - Robitussin PRN for cough  - no nebulizers, MDI only  - COVID Isolation precautions: contact and droplet   - continue to monitor daily CBC w/ differential, CMP, Mg, Phos, CRP, ferritin, and D-dimer  - Covid-19 PCR positive x2.

## 2020-04-22 NOTE — PROGRESS NOTE ADULT - PROBLEM SELECTOR PLAN 3
slowly downtrending since 4/14 but improved 4/20. Likely 2/2 to covid, no evidence of mucosal bleeding or bruising. Not on any meds causing decreased PLTs. DDX includes HIT or Toci induced ITP. 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   - HIT antibody negative, awaiting CORIN  -Gave B12 1000mcg x1 on 4/21 per heme.  - Heme onc consulted, f/u recs  - continue to trend CBCs  - transfuse for PLT<50k

## 2020-04-22 NOTE — PROGRESS NOTE ADULT - ASSESSMENT
79 yo M h/o BPH, HLD presented 4/2 with fever x 3 days. Intubated on admission, extubated on 4/4. s/p Plaquenil/azithromycin, Toci and five day steroid course (ended on 4/7). Course complicated by afib with RVR, now rate controlled on toprol. Patient is currently being treated for acute resp failure secondary to covid infection on 6L NC satting 93%.

## 2020-04-22 NOTE — PROGRESS NOTE ADULT - PROBLEM SELECTOR PLAN 1
Acute hypoxic RF 2/2 COVID-19 infection. Intubated on arrival. Extubated 4/4. S/p azithromycin/plaquenil, steroid course, toci. Restarted steroid course. Currently 6L NC  - wean O2  - Proning as needed if patient desats   - diurese as needed PRN

## 2020-04-23 ENCOUNTER — TRANSCRIPTION ENCOUNTER (OUTPATIENT)
Age: 80
End: 2020-04-23

## 2020-04-23 LAB
ALBUMIN SERPL ELPH-MCNC: 2.9 G/DL — LOW (ref 3.3–5)
ALP SERPL-CCNC: 87 U/L — SIGNIFICANT CHANGE UP (ref 40–120)
ALT FLD-CCNC: 208 U/L — HIGH (ref 10–45)
ANION GAP SERPL CALC-SCNC: 11 MMOL/L — SIGNIFICANT CHANGE UP (ref 5–17)
AST SERPL-CCNC: 95 U/L — HIGH (ref 10–40)
BASOPHILS # BLD AUTO: 0 K/UL — SIGNIFICANT CHANGE UP (ref 0–0.2)
BASOPHILS NFR BLD AUTO: 0 % — SIGNIFICANT CHANGE UP (ref 0–2)
BILIRUB SERPL-MCNC: 0.4 MG/DL — SIGNIFICANT CHANGE UP (ref 0.2–1.2)
BUN SERPL-MCNC: 17 MG/DL — SIGNIFICANT CHANGE UP (ref 7–23)
CALCIUM SERPL-MCNC: 8.7 MG/DL — SIGNIFICANT CHANGE UP (ref 8.4–10.5)
CHLORIDE SERPL-SCNC: 102 MMOL/L — SIGNIFICANT CHANGE UP (ref 96–108)
CO2 SERPL-SCNC: 26 MMOL/L — SIGNIFICANT CHANGE UP (ref 22–31)
CREAT SERPL-MCNC: 0.63 MG/DL — SIGNIFICANT CHANGE UP (ref 0.5–1.3)
CRP SERPL-MCNC: 0.98 MG/DL — HIGH (ref 0–0.4)
D DIMER BLD IA.RAPID-MCNC: 188 NG/ML DDU — SIGNIFICANT CHANGE UP
EOSINOPHIL # BLD AUTO: 0.01 K/UL — SIGNIFICANT CHANGE UP (ref 0–0.5)
EOSINOPHIL NFR BLD AUTO: 0.1 % — SIGNIFICANT CHANGE UP (ref 0–6)
FERRITIN SERPL-MCNC: 325 NG/ML — SIGNIFICANT CHANGE UP (ref 30–400)
GLUCOSE SERPL-MCNC: 110 MG/DL — HIGH (ref 70–99)
HCT VFR BLD CALC: 36.2 % — LOW (ref 39–50)
HGB BLD-MCNC: 11.6 G/DL — LOW (ref 13–17)
IMM GRANULOCYTES NFR BLD AUTO: 0.6 % — SIGNIFICANT CHANGE UP (ref 0–1.5)
LYMPHOCYTES # BLD AUTO: 0.44 K/UL — LOW (ref 1–3.3)
LYMPHOCYTES # BLD AUTO: 6.4 % — LOW (ref 13–44)
MAGNESIUM SERPL-MCNC: 2 MG/DL — SIGNIFICANT CHANGE UP (ref 1.6–2.6)
MCHC RBC-ENTMCNC: 28.9 PG — SIGNIFICANT CHANGE UP (ref 27–34)
MCHC RBC-ENTMCNC: 32 GM/DL — SIGNIFICANT CHANGE UP (ref 32–36)
MCV RBC AUTO: 90.3 FL — SIGNIFICANT CHANGE UP (ref 80–100)
MONOCYTES # BLD AUTO: 0.57 K/UL — SIGNIFICANT CHANGE UP (ref 0–0.9)
MONOCYTES NFR BLD AUTO: 8.3 % — SIGNIFICANT CHANGE UP (ref 2–14)
NEUTROPHILS # BLD AUTO: 5.79 K/UL — SIGNIFICANT CHANGE UP (ref 1.8–7.4)
NEUTROPHILS NFR BLD AUTO: 84.6 % — HIGH (ref 43–77)
NRBC # BLD: 0 /100 WBCS — SIGNIFICANT CHANGE UP (ref 0–0)
PHOSPHATE SERPL-MCNC: 2 MG/DL — LOW (ref 2.5–4.5)
PLATELET # BLD AUTO: 88 K/UL — LOW (ref 150–400)
POTASSIUM SERPL-MCNC: 4 MMOL/L — SIGNIFICANT CHANGE UP (ref 3.5–5.3)
POTASSIUM SERPL-SCNC: 4 MMOL/L — SIGNIFICANT CHANGE UP (ref 3.5–5.3)
PROT SERPL-MCNC: 6.5 G/DL — SIGNIFICANT CHANGE UP (ref 6–8.3)
RBC # BLD: 4.01 M/UL — LOW (ref 4.2–5.8)
RBC # FLD: 14.7 % — HIGH (ref 10.3–14.5)
SODIUM SERPL-SCNC: 139 MMOL/L — SIGNIFICANT CHANGE UP (ref 135–145)
WBC # BLD: 6.85 K/UL — SIGNIFICANT CHANGE UP (ref 3.8–10.5)
WBC # FLD AUTO: 6.85 K/UL — SIGNIFICANT CHANGE UP (ref 3.8–10.5)

## 2020-04-23 PROCEDURE — 99233 SBSQ HOSP IP/OBS HIGH 50: CPT | Mod: CS,GC

## 2020-04-23 RX ORDER — APIXABAN 2.5 MG/1
5 TABLET, FILM COATED ORAL EVERY 12 HOURS
Refills: 0 | Status: DISCONTINUED | OUTPATIENT
Start: 2020-04-23 | End: 2020-04-24

## 2020-04-23 RX ADMIN — Medication 100 MILLIGRAM(S): at 22:14

## 2020-04-23 RX ADMIN — ALBUTEROL 2 PUFF(S): 90 AEROSOL, METERED ORAL at 05:20

## 2020-04-23 RX ADMIN — Medication 200 MILLIGRAM(S): at 11:09

## 2020-04-23 RX ADMIN — ENOXAPARIN SODIUM 80 MILLIGRAM(S): 100 INJECTION SUBCUTANEOUS at 05:18

## 2020-04-23 RX ADMIN — FAMOTIDINE 20 MILLIGRAM(S): 10 INJECTION INTRAVENOUS at 18:17

## 2020-04-23 RX ADMIN — Medication 200 MILLIGRAM(S): at 20:29

## 2020-04-23 RX ADMIN — Medication 100 MILLIGRAM(S): at 13:06

## 2020-04-23 RX ADMIN — ALBUTEROL 2 PUFF(S): 90 AEROSOL, METERED ORAL at 22:14

## 2020-04-23 RX ADMIN — APIXABAN 5 MILLIGRAM(S): 2.5 TABLET, FILM COATED ORAL at 18:17

## 2020-04-23 RX ADMIN — ALBUTEROL 2 PUFF(S): 90 AEROSOL, METERED ORAL at 18:53

## 2020-04-23 RX ADMIN — Medication 25 MILLIGRAM(S): at 05:19

## 2020-04-23 RX ADMIN — FAMOTIDINE 20 MILLIGRAM(S): 10 INJECTION INTRAVENOUS at 05:18

## 2020-04-23 RX ADMIN — ALBUTEROL 2 PUFF(S): 90 AEROSOL, METERED ORAL at 11:57

## 2020-04-23 RX ADMIN — TAMSULOSIN HYDROCHLORIDE 0.4 MILLIGRAM(S): 0.4 CAPSULE ORAL at 22:14

## 2020-04-23 RX ADMIN — Medication 80 MILLIGRAM(S): at 05:19

## 2020-04-23 RX ADMIN — Medication 100 MILLIGRAM(S): at 05:19

## 2020-04-23 NOTE — PROGRESS NOTE ADULT - PROBLEM SELECTOR PLAN 4
patient with episode of tachycardia 4/17. Shortly after moving OOB to chair. EKG consistent with Sinus tach, RAD with RBBB, prior to Toprol administration. Asymptomatic.  -Resolved  - c/w toprol XL 25mg   - switched from lovenox to eliquis 5mg q12.

## 2020-04-23 NOTE — DISCHARGE NOTE PROVIDER - NSDCCPCAREPLAN_GEN_ALL_CORE_FT
PRINCIPAL DISCHARGE DIAGNOSIS  Diagnosis: Coronavirus infection  Assessment and Plan of Treatment: You have tested POSITIVE for the novel coronavirus (COVID-19). We treated you with azithromycin, plaquenil, Tocizilumab, and steroids. Upon discharge, you must self-quarantine for 14 days, or until the Department of Health contacts you. Please wear a face mask if you are around other individuals. Try to avoid contact with house members, family, and friends for the duration of this quarantine. Please follow up with your primary care physician within 2-3 weeks of your discharge from the hospital. Please take all medications as prescribed. If you experience any worsening or recurrence of your symptoms, particularly worsening or high fever, shortness of breathe, extreme fatigue, or bloody cough please call 9-1-1 immediately or report to the nearest Emergency Department. If you have any questions or concerns, please do not hesitate to call the hospital at [phone].      SECONDARY DISCHARGE DIAGNOSES  Diagnosis: Afib  Assessment and Plan of Treatment: During this hospital admission you were found to be in Atrial Fibrillation. This is an irregular, often rapid heart rate that commonly causes poor blood flow. The heart's upper chambers (atria) beat out of coordination with the lower chambers (ventricles). This condition may have no symptoms, but when symptoms do appear they include palpitations, shortness of breath, and fatigue. Treatments include drugs, electrical shock (cardioversion), and minimally invasive surgery (ablation). Continue to take your blood thinner (eliquis 5mg) and rate controlling medications (metoprolol) as directed. Please continue to take your medications as prescribed and follow up with your primary care doctor and cardiologist in 10-14 days via telehealth      Diagnosis: Respiratory failure  Assessment and Plan of Treatment: When you came into the hospital, your oxygen saturation was very low, requiring you to be intubated. You were then extubated one day later, and placed on an oxygen mask called a nonrebreather. YOu initally required 15L of oxygen, and were weaned down to a nasal canula. You will be discharged to subacute rehab where they will continue to titrate your oxygen level down as they help build up your strength. PRINCIPAL DISCHARGE DIAGNOSIS  Diagnosis: Coronavirus infection  Assessment and Plan of Treatment: You have tested POSITIVE for the novel coronavirus (COVID-19). We treated you with azithromycin, plaquenil, Tocizilumab, and steroids. Upon discharge, you must self-quarantine for 14 days, or until the Department of Health contacts you. Please wear a face mask if you are around other individuals. Try to avoid contact with house members, family, and friends for the duration of this quarantine. Please follow up with your primary care physician within 2-3 weeks of your discharge from the hospital. Please take all medications as prescribed. If you experience any worsening or recurrence of your symptoms, particularly worsening or high fever, shortness of breathe, extreme fatigue, or bloody cough please call 9-1-1 immediately or report to the nearest Emergency Department. If you have any questions or concerns, please do not hesitate to call the hospital at [phone].  - You received a 5-day course of azithromycin and hydroxychloroquine.  - You received 2 dose of tocilizumab   - You received steroids      SECONDARY DISCHARGE DIAGNOSES  Diagnosis: Afib  Assessment and Plan of Treatment: During this hospital admission you were found to be in Atrial Fibrillation. This is an irregular, often rapid heart rate that commonly causes poor blood flow. The heart's upper chambers (atria) beat out of coordination with the lower chambers (ventricles). This condition may have no symptoms, but when symptoms do appear they include palpitations, shortness of breath, and fatigue. Treatments include drugs, electrical shock (cardioversion), and minimally invasive surgery (ablation). Continue to take your blood thinner (eliquis) and rate controlling medications (metoprolol) as directed. Please continue to take your medications as prescribed and follow up with your primary care doctor and cardiologist in 10-14 days via telehealth      Diagnosis: Respiratory failure  Assessment and Plan of Treatment: When you came into the hospital, your oxygen saturation was very low, requiring you to be intubated. You were then extubated one day later, and placed on an oxygen mask called a ranjanabreather. YOu initally required 15L of oxygen, and were weaned down to a nasal canula. You will be discharged home with oxygen 4L nasal cannula.  Please monitor your oxygen saturation with a pulse oximeter. If your oxygen saturation drops below 90% or you develop shortness of breath, you should seek medical attention.    Diagnosis: Thrombocytopenia  Assessment and Plan of Treatment: Thrombocytopenia  You developed a low platelet level while you were in the hospital.  This is likely due to the tocilizumab medication you were given to treated COVID-19 infection.  You were seen by a hematologist and given a dose of vitamin B12.  You should follow-up with your primary care doctor within 10-14 days.  If you develop any signs of bleeding, such as dark, tarry stools, blood in your urine, bleeding gums, bruising, you should seek medical attention.    Diagnosis: Hypertension, unspecified type  Assessment and Plan of Treatment: Hypertension, unspecified type    Diagnosis: Benign prostatic hyperplasia without lower urinary tract symptoms  Assessment and Plan of Treatment: Benign prostatic hyperplasia without lower urinary tract symptoms  Continue your home tamsulosin. PRINCIPAL DISCHARGE DIAGNOSIS  Diagnosis: Coronavirus infection  Assessment and Plan of Treatment: You have tested POSITIVE for the novel coronavirus (COVID-19). We treated you with azithromycin, plaquenil, Tocizilumab, and steroids. Upon discharge, you must self-quarantine for 14 days, or until the Department of Health contacts you. Please wear a face mask if you are around other individuals. Try to avoid contact with house members, family, and friends for the duration of this quarantine. Please follow up with your primary care physician within 2-3 weeks of your discharge from the hospital. Please take all medications as prescribed. If you experience any worsening or recurrence of your symptoms, particularly worsening or high fever, shortness of breathe, extreme fatigue, or bloody cough please call 9-1-1 immediately or report to the nearest Emergency Department. If you have any questions or concerns, please do not hesitate to call the hospital at [phone].  - You received a 5-day course of azithromycin and hydroxychloroquine.  - You received 2 dose of tocilizumab   - You received steroids      SECONDARY DISCHARGE DIAGNOSES  Diagnosis: Afib  Assessment and Plan of Treatment: During this hospital admission you were found to be in Atrial Fibrillation. This is an irregular, often rapid heart rate that commonly causes poor blood flow. The heart's upper chambers (atria) beat out of coordination with the lower chambers (ventricles). This condition may have no symptoms, but when symptoms do appear they include palpitations, shortness of breath, and fatigue. Treatments include drugs, electrical shock (cardioversion), and minimally invasive surgery (ablation). Continue to take your blood thinner (eliquis) and rate controlling medications (metoprolol) as directed. Please continue to take your medications as prescribed and follow up with your primary care doctor and cardiologist in 10-14 days via telehealth      Diagnosis: Respiratory failure  Assessment and Plan of Treatment: When you came into the hospital, your oxygen saturation was very low, requiring you to be intubated. You were then extubated one day later, and placed on an oxygen mask called a ranjanabreather. YOu initally required 15L of oxygen, and were weaned down to a nasal canula. You will be discharged home with oxygen 4L nasal cannula.  Please monitor your oxygen saturation with a pulse oximeter. If your oxygen saturation drops below 90% or you develop shortness of breath, you should seek medical attention.    Diagnosis: Thrombocytopenia  Assessment and Plan of Treatment: Thrombocytopenia  You developed a low platelet level while you were in the hospital.  This is likely due to the tocilizumab medication you were given to treated COVID-19 infection.  You were seen by a hematologist and given a dose of vitamin B12.  You should follow-up with your primary care doctor within 10-14 days.  If you develop any signs of bleeding, such as dark, tarry stools, blood in your urine, bleeding gums, bruising, you should seek medical attention.    Diagnosis: Hypertension, unspecified type  Assessment and Plan of Treatment: Hypertension, unspecified type  Continue Metoprolol XR 25 mg PO daily    Diagnosis: Benign prostatic hyperplasia without lower urinary tract symptoms  Assessment and Plan of Treatment: Benign prostatic hyperplasia without lower urinary tract symptoms  Continue home tamsulosin. PRINCIPAL DISCHARGE DIAGNOSIS  Diagnosis: Coronavirus infection  Assessment and Plan of Treatment: You have tested POSITIVE for the novel coronavirus (COVID-19). We treated you with azithromycin, plaquenil, Tocizilumab, and steroids. Upon discharge, you must self-quarantine for 14 days, or until the Department of Health contacts you. Please wear a face mask if you are around other individuals. Try to avoid contact with house members, family, and friends for the duration of this quarantine. Please follow up with your primary care physician within 2-3 weeks of your discharge from the hospital. Please take all medications as prescribed. If you experience any worsening or recurrence of your symptoms, particularly worsening or high fever, shortness of breathe, extreme fatigue, or bloody cough please call 9-1-1 immediately or report to the nearest Emergency Department. If you have any questions or concerns, please do not hesitate to call the hospital at [phone].  - You received a 5-day course of azithromycin and hydroxychloroquine.  - You received 2 dose of tocilizumab   - You received steroids      SECONDARY DISCHARGE DIAGNOSES  Diagnosis: Afib  Assessment and Plan of Treatment: During this hospital admission you were found to be in Atrial Fibrillation. This is an irregular, often rapid heart rate that commonly causes poor blood flow. The heart's upper chambers (atria) beat out of coordination with the lower chambers (ventricles). This condition may have no symptoms, but when symptoms do appear they include palpitations, shortness of breath, and fatigue. Treatments include drugs, electrical shock (cardioversion), and minimally invasive surgery (ablation). Continue to take your blood thinner (eliquis) and rate controlling medications (metoprolol) as directed. Please continue to take your medications as prescribed and follow up with your primary care doctor and cardiologist in 10-14 days via telehealth      Diagnosis: Respiratory failure  Assessment and Plan of Treatment: When you came into the hospital, your oxygen saturation was very low, requiring you to be intubated. You were then extubated one day later, and placed on an oxygen mask called a ranjanabreather. YOu initally required 15L of oxygen, and were weaned down to a nasal canula. You will be discharged home with oxygen 4L nasal cannula.  Please monitor your oxygen saturation with a pulse oximeter. If your oxygen saturation drops below 90% or you develop shortness of breath, you should seek medical attention.   Follow-up with pulmonary scheduled for 5/13 @ 9am  [address]    Diagnosis: Thrombocytopenia  Assessment and Plan of Treatment: Thrombocytopenia  You developed a low platelet level while you were in the hospital.  This is likely due to the tocilizumab medication you were given to treated COVID-19 infection.  You were seen by a hematologist and given a dose of vitamin B12.  You should follow-up with your primary care doctor within 10-14 days.  If you develop any signs of bleeding, such as dark, tarry stools, blood in your urine, bleeding gums, bruising, you should seek medical attention.    Diagnosis: Hypertension, unspecified type  Assessment and Plan of Treatment: Hypertension, unspecified type  Continue Metoprolol XR 25 mg PO daily    Diagnosis: Benign prostatic hyperplasia without lower urinary tract symptoms  Assessment and Plan of Treatment: Benign prostatic hyperplasia without lower urinary tract symptoms  Continue home tamsulosin. PRINCIPAL DISCHARGE DIAGNOSIS  Diagnosis: Coronavirus infection  Assessment and Plan of Treatment: You have tested POSITIVE for the novel coronavirus (COVID-19). We treated you with azithromycin, plaquenil, Tocizilumab, and steroids. Upon discharge, you must self-quarantine for 14 days, or until the Department of Health contacts you. Please wear a face mask if you are around other individuals. Try to avoid contact with house members, family, and friends for the duration of this quarantine. Please follow up with your primary care physician within 2-3 weeks of your discharge from the hospital. Please take all medications as prescribed. If you experience any worsening or recurrence of your symptoms, particularly worsening or high fever, shortness of breathe, extreme fatigue, or bloody cough please call 9-1-1 immediately or report to the nearest Emergency Department. If you have any questions or concerns, please do not hesitate to call the hospital at [phone].  - You received a 5-day course of azithromycin and hydroxychloroquine.  - You received 1 dose of tocilizumab   - You received steroids      SECONDARY DISCHARGE DIAGNOSES  Diagnosis: Afib  Assessment and Plan of Treatment: During this hospital admission you were found to be in Atrial Fibrillation. This is an irregular, often rapid heart rate that commonly causes poor blood flow. The heart's upper chambers (atria) beat out of coordination with the lower chambers (ventricles). This condition may have no symptoms, but when symptoms do appear they include palpitations, shortness of breath, and fatigue. Treatments include drugs, electrical shock (cardioversion), and minimally invasive surgery (ablation). Continue to take your blood thinner (eliquis) and rate controlling medications (metoprolol) as directed. Please continue to take your medications as prescribed and follow up with your primary care doctor and cardiologist in 7-10 days via telehealth.      Diagnosis: Respiratory failure  Assessment and Plan of Treatment: When you came into the hospital, your oxygen saturation was very low, requiring you to be intubated. You were then extubated one day later, and placed on an oxygen mask called araceli vieyra. YOu initally required 15L of oxygen, and were weaned down to a nasal canula. You will be discharged home with oxygen 4L nasal cannula.  Please monitor your oxygen saturation with a pulse oximeter. If your oxygen saturation drops below 90% or you develop shortness of breath, you should seek medical attention.  Continue your prednisone (steroid) taper.   Follow-up with pulmonary scheduled for 5/13 @ 9am  [address]    Diagnosis: Thrombocytopenia  Assessment and Plan of Treatment: Thrombocytopenia  You developed a low platelet level while you were in the hospital.  This is likely due to the tocilizumab medication you were given to treated COVID-19 infection.  You were seen by a hematologist and given a dose of vitamin B12.  You should follow-up with your primary care doctor within 10-14 days.  If you develop any signs of bleeding, such as dark, tarry stools, blood in your urine, bleeding gums, bruising, you should seek medical attention.    Diagnosis: Hypertension, unspecified type  Assessment and Plan of Treatment: Hypertension, unspecified type  Continue Metoprolol XR 25 mg PO daily    Diagnosis: Benign prostatic hyperplasia without lower urinary tract symptoms  Assessment and Plan of Treatment: Benign prostatic hyperplasia without lower urinary tract symptoms  Continue home tamsulosin. PRINCIPAL DISCHARGE DIAGNOSIS  Diagnosis: Coronavirus infection  Assessment and Plan of Treatment: You have tested POSITIVE for the novel coronavirus (COVID-19). We treated you with azithromycin, plaquenil, Tocizilumab, and steroids. Upon discharge, you must self-quarantine for 14 days, or until the Department of Health contacts you. Please wear a face mask if you are around other individuals. Try to avoid contact with house members, family, and friends for the duration of this quarantine. Please follow up with your primary care physician within 2-3 weeks of your discharge from the hospital. Please take all medications as prescribed. If you experience any worsening or recurrence of your symptoms, particularly worsening or high fever, shortness of breathe, extreme fatigue, or bloody cough please call 9-1-1 immediately or report to the nearest Emergency Department. If you have any questions or concerns, please do not hesitate to call the hospital at [phone].  - You received a 5-day course of azithromycin and hydroxychloroquine.  - You received 1 dose of tocilizumab   - You received steroids. Please continue Prednisone 40mg daily from April 25 -29, 2020. Then 30mg daily April 30 - May 2, 2020. Then 20mg May 3-5, 2020, then 10mg May 6 - 8, 2020.   - You are being discharged on home oxygen. Please follow up with Dr. Rodriguez for evaluation. His information is provided in this document.      SECONDARY DISCHARGE DIAGNOSES  Diagnosis: Hypertension, unspecified type  Assessment and Plan of Treatment: Continue Metoprolol XR 25 mg oral daily.    Diagnosis: Benign prostatic hyperplasia without lower urinary tract symptoms  Assessment and Plan of Treatment: Continue home tamsulosin daily.    Diagnosis: Thrombocytopenia  Assessment and Plan of Treatment: You developed a low platelet level while you were in the hospital.  This is likely due to the tocilizumab medication you were given to treated COVID-19 infection.  You were seen by a hematologist and given a dose of vitamin B12.  You should follow-up with your primary care doctor within 10-14 days.  If you develop any signs of bleeding, such as dark, tarry stools, blood in your urine, bleeding gums, bruising, you should seek medical attention.    Diagnosis: Afib  Assessment and Plan of Treatment: During this hospital admission you were found to be in Atrial Fibrillation. This is an irregular, often rapid heart rate that commonly causes poor blood flow. The heart's upper chambers (atria) beat out of coordination with the lower chambers (ventricles). This condition may have no symptoms, but when symptoms do appear they include palpitations, shortness of breath, and fatigue. Treatments include drugs, electrical shock (cardioversion), and minimally invasive surgery (ablation). Continue to take your blood thinner (eliquis) and rate controlling medications (metoprolol) as directed. Please continue to take your medications as prescribed and follow up with your primary care doctor and cardiologist in 7-10 days via telehealth.      Diagnosis: Respiratory failure  Assessment and Plan of Treatment: When you came into the hospital, your oxygen saturation was very low, requiring you to be intubated. You were then extubated one day later, and placed on an oxygen mask called a nonrebreather. You initally required 15L of oxygen, and were weaned down to a nasal canula. You will be discharged home with oxygen 4L nasal cannula.  Please monitor your oxygen saturation with a pulse oximeter. If your oxygen saturation drops below 90% or you develop shortness of breath, you should seek medical attention. You received steroids. Please continue Prednisone 40mg daily from April 25 -29, 2020. Then 30mg daily April 30 - May 2, 2020. Then 20mg May 3-5, 2020, then 10mg May 6 - 8, 2020.   Follow-up with pulmonary scheduled for 5/13 @ 9am  [address] PRINCIPAL DISCHARGE DIAGNOSIS  Diagnosis: Coronavirus infection  Assessment and Plan of Treatment: You have tested POSITIVE for the novel coronavirus (COVID-19). We treated you with azithromycin, plaquenil, Tocizilumab, and steroids. Upon discharge, you must self-quarantine for 14 days, or until the Department of Health contacts you. Please wear a face mask if you are around other individuals. Try to avoid contact with house members, family, and friends for the duration of this quarantine. Please follow up with your primary care physician within 2-3 weeks of your discharge from the hospital. Please take all medications as prescribed. If you experience any worsening or recurrence of your symptoms, particularly worsening or high fever, shortness of breathe, extreme fatigue, or bloody cough please call 9-1-1 immediately or report to the nearest Emergency Department. If you have any questions or concerns, please do not hesitate to call the hospital at [phone].  - You received a 5-day course of azithromycin and hydroxychloroquine.  - You received 1 dose of tocilizumab   - You received steroids. Please continue Prednisone 40mg daily from April 25 -29, 2020. Then 30mg daily April 30 - May 2, 2020. Then 20mg May 3-5, 2020, then 10mg May 6 - 8, 2020.   - You are being discharged on home oxygen. Please follow up with Dr. Rodriguez for evaluation. His information is provided in this document.      SECONDARY DISCHARGE DIAGNOSES  Diagnosis: Afib  Assessment and Plan of Treatment: During this hospital admission you were found to be in Atrial Fibrillation. This is an irregular, often rapid heart rate that commonly causes poor blood flow. The heart's upper chambers (atria) beat out of coordination with the lower chambers (ventricles). This condition may have no symptoms, but when symptoms do appear they include palpitations, shortness of breath, and fatigue. Treatments include drugs, electrical shock (cardioversion), and minimally invasive surgery (ablation). Continue to take your blood thinner (eliquis) and rate controlling medications (metoprolol) as directed. Please continue to take your medications as prescribed and follow up with your primary care doctor and cardiologist in 7-10 days via telehealth.      Diagnosis: Respiratory failure  Assessment and Plan of Treatment: When you came into the hospital, your oxygen saturation was very low, requiring you to be intubated. You were then extubated one day later, and placed on an oxygen mask called a nonrebreather. You initally required 15L of oxygen, and were weaned down to a nasal canula. You will be discharged home with oxygen 4L nasal cannula.  Please monitor your oxygen saturation with a pulse oximeter. If your oxygen saturation drops below 90% or you develop shortness of breath, you should seek medical attention. You received steroids. Please continue Prednisone 40mg daily from April 25 -29, 2020. Then 30mg daily April 30 - May 2, 2020. Then 20mg May 3-5, 2020, then 10mg May 6 - 8, 2020.   Follow-up with pulmonary scheduled for 5/13 @ 9am  [address]    Diagnosis: Thrombocytopenia  Assessment and Plan of Treatment: You developed a low platelet level while you were in the hospital.  This is likely due to the tocilizumab medication you were given to treated COVID-19 infection.  You were seen by a hematologist and given a dose of vitamin B12.  You should follow-up with your primary care doctor within 10-14 days.  If you develop any signs of bleeding, such as dark, tarry stools, blood in your urine, bleeding gums, bruising, you should seek medical attention.    Diagnosis: Hypertension, unspecified type  Assessment and Plan of Treatment: Continue Metoprolol XR 25 mg oral daily.    Diagnosis: Benign prostatic hyperplasia without lower urinary tract symptoms  Assessment and Plan of Treatment: Continue home tamsulosin daily.

## 2020-04-23 NOTE — PROGRESS NOTE ADULT - ASSESSMENT
81yo man never smoker admitted 4/2 for acute hypoxemic respiratory failure 2/2 SARS-CoV-2 infection. Pulmonary consulted for mgmt recs for persistent hypoxemic respiratory failure.

## 2020-04-23 NOTE — PROGRESS NOTE ADULT - PROBLEM SELECTOR PLAN 2
as above    Ensure follow up with pulmonary by referring to the [address] [place] [address]. [phone]   Patient s/e/d with pulmonary attending Dr. Villalpando.

## 2020-04-23 NOTE — DISCHARGE NOTE PROVIDER - NSDCMRMEDTOKEN_GEN_ALL_CORE_FT
aspirin 81 mg oral tablet: 1 tab(s) orally once a day  Crestor 10 mg oral tablet: 1 tab(s) orally once a day  Flomax 0.4 mg oral capsule: 1 cap(s) orally once a day  Metoprolol Tartrate 25 mg oral tablet: 12.5 tab(s) orally 2 times a day apixaban 5 mg oral tablet: 1 tab(s) orally every 12 hours  aspirin 81 mg oral tablet: 1 tab(s) orally once a day  Crestor 10 mg oral tablet: 1 tab(s) orally once a day  Flomax 0.4 mg oral capsule: 1 cap(s) orally once a day  metoprolol succinate 25 mg oral tablet, extended release: 1 tab(s) orally once a day  predniSONE 10 mg oral tablet: 1 tab(s) orally once a day from May 6 - 8, 2020  predniSONE 10 mg oral tablet: 3 tab(s) orally once a day from April 30 - May 2, 2020  predniSONE 20 mg oral tablet: 1 tab(s) orally once a day from May 3 - May 5, 2020  predniSONE 20 mg oral tablet: 2 tab(s) orally once a day from April 25 -29, 2020

## 2020-04-23 NOTE — PROGRESS NOTE ADULT - SUBJECTIVE AND OBJECTIVE BOX
OVERNIGHT EVENTS: increased to 6L NC as patient was dessating on 4L.     SUBJECTIVE / INTERVAL HPI: Patient seen and examined at bedside.     VITAL SIGNS:  Vital Signs Last 24 Hrs  T(C): 36.9 (23 Apr 2020 08:30), Max: 36.9 (23 Apr 2020 08:30)  T(F): 98.4 (23 Apr 2020 08:30), Max: 98.4 (23 Apr 2020 08:30)  HR: 75 (23 Apr 2020 08:30) (61 - 77)  BP: 117/71 (23 Apr 2020 08:30) (115/56 - 130/76)  BP(mean): --  RR: 18 (23 Apr 2020 08:30) (16 - 18)  SpO2: 93% (23 Apr 2020 08:30) (92% - 94%)  I&O's Summary    22 Apr 2020 07:01  -  23 Apr 2020 07:00  --------------------------------------------------------  IN: 0 mL / OUT: 900 mL / NET: -900 mL    23 Apr 2020 07:01  -  23 Apr 2020 09:22  --------------------------------------------------------  IN: 0 mL / OUT: 300 mL / NET: -300 mL        PHYSICAL EXAM:    General: WDWN  HEENT: NC/AT; PERRL, anicteric sclera; MMM  Neck: supple  Cardiovascular: +S1/S2; RRR  Respiratory: CTA B/L; no W/R/R  Gastrointestinal: soft, NT/ND; +BSx4  Extremities: WWP; no edema, clubbing or cyanosis  Vascular: 2+ radial, DP/PT pulses B/L  Neurological: AAOx3; no focal deficits    MEDICATIONS:  MEDICATIONS  (STANDING):  ALBUTerol    90 MICROgram(s) HFA Inhaler 2 Puff(s) Inhalation every 6 hours  benzonatate 100 milliGRAM(s) Oral every 8 hours  dextrose 5%. 1000 milliLiter(s) (50 mL/Hr) IV Continuous <Continuous>  dextrose 50% Injectable 12.5 Gram(s) IV Push once  dextrose 50% Injectable 25 Gram(s) IV Push once  dextrose 50% Injectable 25 Gram(s) IV Push once  enoxaparin Injectable 80 milliGRAM(s) SubCutaneous every 12 hours  famotidine    Tablet 20 milliGRAM(s) Oral two times a day  metoprolol succinate ER 25 milliGRAM(s) Oral daily  predniSONE   Tablet 80 milliGRAM(s) Oral every 24 hours  tamsulosin 0.4 milliGRAM(s) Oral at bedtime    MEDICATIONS  (PRN):  acetaminophen    Suspension .. 650 milliGRAM(s) Enteral Tube every 6 hours PRN Temp greater or equal to 38C (100.4F), Moderate Pain (4 - 6)  artificial tears (preservative free) Ophthalmic Solution 1 Drop(s) Both EYES four times a day PRN Dry Eyes  benzocaine 15 mG/menthol 3.6 mG (Sugar-Free) Lozenge 1 Lozenge Oral three times a day PRN cough  dextrose 40% Gel 15 Gram(s) Oral once PRN Blood Glucose LESS THAN 70 milliGRAM(s)/deciliter  glucagon  Injectable 1 milliGRAM(s) IntraMuscular once PRN Glucose LESS THAN 70 milligrams/deciliter  guaiFENesin   Syrup  (Sugar-Free) 200 milliGRAM(s) Oral every 6 hours PRN Cough      ALLERGIES:  Allergies    No Known Allergies    Intolerances        LABS:                        11.6   6.85  )-----------( 88       ( 23 Apr 2020 08:16 )             36.2     04-23    139  |  102  |  17  ----------------------------<  110<H>  4.0   |  26  |  0.63    Ca    8.7      23 Apr 2020 08:16  Phos  2.0     04-23  Mg     2.0     04-23    TPro  6.5  /  Alb  2.9<L>  /  TBili  0.4  /  DBili  x   /  AST  95<H>  /  ALT  208<H>  /  AlkPhos  87  04-23        CAPILLARY BLOOD GLUCOSE          RADIOLOGY & ADDITIONAL TESTS: Reviewed. OVERNIGHT EVENTS: increased to 6L NC as patient was dessating on 4L.     SUBJECTIVE / INTERVAL HPI: Patient seen and examined at bedside. Patient stated he was had difficulty sleeping last night because of a cough and SOB. Noticed the connector for the nasal cannula was loose. Once it was corrected, his O2 sat increased from 82% to 93-94%. He then reported his SOB greatly improved. Only complain is a persistent dry cough. 12 point ROS is otherwise negative.    VITAL SIGNS:  Vital Signs Last 24 Hrs  T(C): 36.9 (23 Apr 2020 08:30), Max: 36.9 (23 Apr 2020 08:30)  T(F): 98.4 (23 Apr 2020 08:30), Max: 98.4 (23 Apr 2020 08:30)  HR: 75 (23 Apr 2020 08:30) (61 - 77)  BP: 117/71 (23 Apr 2020 08:30) (115/56 - 130/76)  BP(mean): --  RR: 18 (23 Apr 2020 08:30) (16 - 18)  SpO2: 93% (23 Apr 2020 08:30) (92% - 94%)  I&O's Summary    22 Apr 2020 07:01  -  23 Apr 2020 07:00  --------------------------------------------------------  IN: 0 mL / OUT: 900 mL / NET: -900 mL    23 Apr 2020 07:01  -  23 Apr 2020 09:22  --------------------------------------------------------  IN: 0 mL / OUT: 300 mL / NET: -300 mL        PHYSICAL EXAM:    General: WDWN, elderly, sitting in chair  HEENT: NC/AT; PERRL, anicteric sclera; MMM  Neck: supple, no jvd  Cardiovascular: +S1/S2; RRR  Respiratory: Crackles appreciated greater on the R. than the L.   Gastrointestinal: soft, NT/ND; +BSx4  Extremities: WWP; no edema, clubbing or cyanosis  Vascular: 2+ radial, DP/PT pulses B/L  Neurological: AAOx3; no focal deficits    MEDICATIONS:  MEDICATIONS  (STANDING):  ALBUTerol    90 MICROgram(s) HFA Inhaler 2 Puff(s) Inhalation every 6 hours  benzonatate 100 milliGRAM(s) Oral every 8 hours  dextrose 5%. 1000 milliLiter(s) (50 mL/Hr) IV Continuous <Continuous>  dextrose 50% Injectable 12.5 Gram(s) IV Push once  dextrose 50% Injectable 25 Gram(s) IV Push once  dextrose 50% Injectable 25 Gram(s) IV Push once  enoxaparin Injectable 80 milliGRAM(s) SubCutaneous every 12 hours  famotidine    Tablet 20 milliGRAM(s) Oral two times a day  metoprolol succinate ER 25 milliGRAM(s) Oral daily  predniSONE   Tablet 80 milliGRAM(s) Oral every 24 hours  tamsulosin 0.4 milliGRAM(s) Oral at bedtime    MEDICATIONS  (PRN):  acetaminophen    Suspension .. 650 milliGRAM(s) Enteral Tube every 6 hours PRN Temp greater or equal to 38C (100.4F), Moderate Pain (4 - 6)  artificial tears (preservative free) Ophthalmic Solution 1 Drop(s) Both EYES four times a day PRN Dry Eyes  benzocaine 15 mG/menthol 3.6 mG (Sugar-Free) Lozenge 1 Lozenge Oral three times a day PRN cough  dextrose 40% Gel 15 Gram(s) Oral once PRN Blood Glucose LESS THAN 70 milliGRAM(s)/deciliter  glucagon  Injectable 1 milliGRAM(s) IntraMuscular once PRN Glucose LESS THAN 70 milligrams/deciliter  guaiFENesin   Syrup  (Sugar-Free) 200 milliGRAM(s) Oral every 6 hours PRN Cough      ALLERGIES:  Allergies    No Known Allergies    Intolerances        LABS:                        11.6   6.85  )-----------( 88       ( 23 Apr 2020 08:16 )             36.2     04-23    139  |  102  |  17  ----------------------------<  110<H>  4.0   |  26  |  0.63    Ca    8.7      23 Apr 2020 08:16  Phos  2.0     04-23  Mg     2.0     04-23    TPro  6.5  /  Alb  2.9<L>  /  TBili  0.4  /  DBili  x   /  AST  95<H>  /  ALT  208<H>  /  AlkPhos  87  04-23        CAPILLARY BLOOD GLUCOSE          RADIOLOGY & ADDITIONAL TESTS: Reviewed.

## 2020-04-23 NOTE — PROGRESS NOTE ADULT - PROBLEM SELECTOR PLAN 7
F- None  E- Replete as needed  N- Mechanical soft diet  C- Full code  DVT ppx: enoxaparin   GI ppx: famotidine
F- None  E- Replete as needed  N- Mechanical soft diet  C- Full code  DVT ppx: enoxaparin 80mg q12  GI ppx: famotidine    Dispo to [place]
F- None  E-replete as needed  N- mechanical soft diet  C- full code  DVt ppx: enoxaparin   GI ppx: famotidine
F- None  E- Replete as needed  N- Mechanical soft diet  C- Full code  DVT ppx: eliquis 5mg q12  GI ppx: famotidine    Dispo to [place]
F- None  E-replete as needed  N- mechanical soft diet  C- full code  DVt ppx: enoxaparin   GI ppx: famotidine
F- None  E-replete as needed  N- mechanical soft diet  C- full code  DVt ppx: enoxaparin   GI ppx: famotidine

## 2020-04-23 NOTE — DISCHARGE NOTE PROVIDER - PROVIDER TOKENS
PROVIDER:[TOKEN:[[id number]]] PROVIDER:[TOKEN:[[id number]],FOLLOWUP:[1 week]] PROVIDER:[TOKEN:[[id number]],FOLLOWUP:[1 week]],PROVIDER:[TOKEN:[[id number]]]

## 2020-04-23 NOTE — PROGRESS NOTE ADULT - PROBLEM SELECTOR PLAN 3
slowly downtrending since 4/14 but improved 4/20. Likely 2/2 to covid, no evidence of mucosal bleeding or bruising. Not on any meds causing decreased PLTs. 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  - HIT antibody negative, CORIN negative  -Gave B12 1000mcg x1 on 4/21 per heme.  - Heme onc consulted, f/u recs  - continue to trend CBCs  - transfuse for PLT<50k

## 2020-04-23 NOTE — DISCHARGE NOTE PROVIDER - CARE PROVIDERS DIRECT ADDRESSES
,DirectAddress_Unknown ,DirectAddress_Unknown,[email or web address]

## 2020-04-23 NOTE — DISCHARGE NOTE PROVIDER - CARE PROVIDER_API CALL
Brian Nunez)  Internal Medicine  [address] [address]  Phone: [phone]  Fax: [phone]  Follow Up Time: Brian Nunez)  Internal Medicine  [address] [address]  Phone: [phone]  Fax: [phone]  Follow Up Time: 1 week Brian Nunez)  Internal Medicine  [address] [address]  Phone: [phone]  Fax: [phone]  Follow Up Time: 1 week    Tye Rodriguez)  Internal Medicine; Pulmonary Disease  [address] [place] [address]  Phone: [phone]  Fax: [phone]  Follow Up Time:

## 2020-04-23 NOTE — PROGRESS NOTE ADULT - ASSESSMENT
81 yo M h/o BPH, HLD presented 4/2 with fever x 3 days. Intubated on admission, extubated on 4/4. s/p Plaquenil/azithromycin, Toci and five day steroid course (ended on 4/7). Course complicated by afib with RVR, now rate controlled on toprol. Patient is currently being treated for acute resp failure secondary to covid infection on 6L NC satting 93%.

## 2020-04-23 NOTE — DISCHARGE NOTE PROVIDER - NSDCFUSCHEDAPPT_GEN_ALL_CORE_FT
RISHABH PALACIO ; 05/13/2020 ; [facility] [address] RISHABH PALACIO ; 05/13/2020 ; [facility] [address] RISHABH PALACIO ; 05/13/2020 ; [facility] [address] RISHABH PALACIO ; 05/13/2020 ; [facility] [address] RISHABH PALACIO ; 05/13/2020 ; [facility] [address] RISHABH PALACIO ; 05/13/2020 ; [facility] [address]

## 2020-04-23 NOTE — DISCHARGE NOTE PROVIDER - HOSPITAL COURSE
Mr Bo is an 79 yo M h/o BPH and HLD who presented 4/2 with fever x 3 days. He initiated azithromycin and plaquenil treatment via his outpatient PCP (Dr. Nunez), but had increased work of breathing and desatting. He was sent to ED where he was Intubated on admission, extubated on 4/4. s/p Plaquenil/azithromycin, Toci and steroid course. Weaned from 15L NRB to _____. Course complicated by afib with RVR, now rate controlled on toprol and on eliquis 5. Patient has thrombocytopenia most likely 2/2 tocilizumab. Heme onc recommended 1000mcg B12 x1. Patient is being weaned off O2 and will be discharged to [facility]. Mr Bo is an 79 yo M h/o BPH and HLD who presented 4/2 with fever x 3 days. He initiated azithromycin and plaquenil treatment via his outpatient PCP (Dr. Nunez), but had increased work of breathing and desatting. He was sent to ED where he was Intubated on admission, extubated on 4/4. s/p Plaquenil/azithromycin, Toci and steroid course. Weaned from 15L NRB to 4L NC. Course complicated by afib with RVR, now rate controlled on toprol and on eliquis 5mg BID. Patient has thrombocytopenia most likely 2/2 tocilizumab. Heme onc recommended 1000mcg B12 x1. He is stable without respiratory distress, 02 sat 94% on 4L NC.  He will be discharged home with home 02.         # Acute respiratory failure with hypoxia.      - Acute hypoxic respiratory failure 2/2 COVID-19 infection.     - Intubated on arrival. Extubated 4/4.     - S/p azithromycin/plaquenil, steroid course, tocilizumab.     - Restarted steroid course.     - 02 sat 94% on 4L NC        # Pneumonia due to 2019 novel coronavirus.      - S/P azithromycin/hydroxychloroquine, tocilizumab, steroids    - Continue Prednisone ___    - Tylenol PRN for fever    - Robitussin PRN for cough        # Thrombocytopenia.      - Likely 2/2 to covid, no evidence of mucosal bleeding or bruising.     - Not on any meds causing decreased PLTs. 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    - HIT antibody negative, CORIN negative    - S/p B12 1000mcg x1 on 4/21 per heme.        # Paroxysmal atrial fibrillation.    - c/w toprol XL 25mg     - switched from lovenox to eliquis 5mg q12.         # Hypertension    - Resolved        # Benign prostatic hyperplasia without lower urinary tract symptoms    - c/w home tamsulosin 0.4 mg. Mr Bo is an 79 yo M h/o BPH and HLD who presented 4/2 with fever x 3 days. He initiated azithromycin and plaquenil treatment via his outpatient PCP (Dr. Nunez), but had increased work of breathing and desatting. He was sent to ED where he was Intubated on admission, extubated on 4/4. s/p Plaquenil/azithromycin, Toci and steroid course. Weaned from 15L NRB to 4L NC. Course complicated by afib with RVR, now rate controlled on toprol and on eliquis 5mg BID. Patient has thrombocytopenia most likely 2/2 tocilizumab. Heme onc recommended 1000mcg B12 x1. He is stable without respiratory distress, 02 sat 94% on 4L NC.  He will be discharged home with home 02.         # Acute respiratory failure with hypoxia.      - Acute hypoxic respiratory failure 2/2 COVID-19 infection.     - Intubated on arrival. Extubated 4/4.     - S/p azithromycin/plaquenil, steroid course, tocilizumab.     - Restarted steroid course.     - 02 sat 94% on 4L NC        # Pneumonia due to 2019 novel coronavirus.      - S/P azithromycin/hydroxychloroquine, tocilizumab, steroids    - Continue Prednisone ___    - Tylenol PRN for fever    - Robitussin PRN for cough        # Thrombocytopenia.      - No evidence of mucosal bleeding or bruising.     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    - HIT antibody negative, CORIN negative    - S/p B12 1000mcg x1 on 4/21 per heme.        # Paroxysmal atrial fibrillation.    - c/w toprol XL 25mg     - switched from lovenox to eliquis 5mg q12.         # Hypertension    - Resolved        # Benign prostatic hyperplasia without lower urinary tract symptoms    - c/w home tamsulosin 0.4 mg. Mr Bo is an 81 yo M h/o BPH and HLD who presented 4/2 with fever x 3 days. He initiated azithromycin and plaquenil treatment via his outpatient PCP (Dr. Nunez), but had increased work of breathing and desatting. He was sent to ED where he was Intubated on admission, extubated on 4/4. s/p Plaquenil/azithromycin, Toci and steroid course. Weaned from 15L NRB to 4L NC. Course complicated by afib with RVR, now rate controlled on toprol and on eliquis 5mg BID. Patient has thrombocytopenia most likely 2/2 tocilizumab. Heme onc recommended 1000mcg B12 x1. He is stable without respiratory distress, 02 sat 94% on 4L NC.  He will be discharged home with home 02.         # Acute respiratory failure with hypoxia    - Acute hypoxic respiratory failure 2/2 COVID-19 infection.     - Intubated on arrival. Extubated 4/4.     - S/p azithromycin/plaquenil, steroid course, tocilizumab.     - Restarted steroid course.     - 02 sat 94% on 4L NC    - Outpatient pulmonary follow-up        # Pneumonia due to 2019 novel coronavirus    - S/P azithromycin/hydroxychloroquine, tocilizumab, steroids    - Continue Prednisone ___    - Tylenol PRN for fever    - Robitussin PRN for cough        # Thrombocytopenia    - No evidence of mucosal bleeding or bruising.     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    - HIT antibody negative, CORIN negative    - S/p B12 1000mcg x1 on 4/21 per heme.        # Paroxysmal atrial fibrillation    - C/w Toprol 25 mg PO daily    - switched from lovenox to eliquis 5mg q12.         # Hypertension    - Toprol 25 mg PO daily        # Benign prostatic hyperplasia without lower urinary tract symptoms    - c/w home tamsulosin 0.4 mg. Mr Bo is an 81 yo M h/o BPH and HLD who presented 4/2 with fever x 3 days. He initiated azithromycin and plaquenil treatment via his outpatient PCP (Dr. Nunez), but had increased work of breathing and desatting. He was sent to ED where he was Intubated on admission, extubated on 4/4. s/p Plaquenil/azithromycin, Toci and steroid course. Weaned from 15L NRB to 4L NC. Course complicated by afib with RVR, now rate controlled on toprol and on eliquis 5mg BID. Patient has thrombocytopenia most likely 2/2 tocilizumab. Heme onc recommended 1000mcg B12 x1. He is stable without respiratory distress, 02 sat 94% on 4L NC.  He will be discharged home with home 02.         # Acute respiratory failure with hypoxia    - Acute hypoxic respiratory failure 2/2 COVID-19 infection.     - Intubated on arrival. Extubated 4/4.     - S/p azithromycin/plaquenil, steroid course, tocilizumab.     - Restarted steroid course.     - 02 sat 94% on 4L NC    - Outpatient pulmonary follow-up        # Pneumonia due to 2019 novel coronavirus    - S/P azithromycin/hydroxychloroquine, tocilizumab, steroids    - Continue Prednisone taper    - Tylenol PRN for fever    - Robitussin PRN for cough        # Thrombocytopenia    - No evidence of mucosal bleeding or bruising.     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    - HIT antibody negative, CORIN negative    - S/p B12 1000mcg x1 on 4/21 per heme.        # Paroxysmal atrial fibrillation    - C/w Toprol 25 mg PO daily    - switched from lovenox to eliquis 5mg q12.         # Hypertension    - Toprol 25 mg PO daily        # Benign prostatic hyperplasia without lower urinary tract symptoms    - c/w home tamsulosin 0.4 mg.        New medications on discharge:    Metoprolol XR 25mg PO daily    Eliquis 5mg PO BID Mr Bo is an 79 yo M h/o BPH and HLD who presented 4/2 with fever x 3 days. He initiated azithromycin and plaquenil treatment via his outpatient PCP (Dr. Nunez), but had increased work of breathing and desatting. He was sent to ED where he was Intubated on admission, extubated on 4/4. s/p Plaquenil/azithromycin, Toci and steroid course. Weaned from 15L NRB to 4L NC. Course complicated by afib with RVR, now rate controlled on toprol and on eliquis 5mg BID. Patient has thrombocytopenia most likely 2/2 tocilizumab. Heme onc recommended 1000mcg B12 x1. He is stable without respiratory distress, 02 sat 94% on 4L NC.  He will be discharged home with home 02.         # Acute respiratory failure with hypoxia    - Acute hypoxic respiratory failure 2/2 COVID-19 infection.     - Intubated on arrival. Extubated 4/4.     - S/p azithromycin/plaquenil, steroid course, tocilizumab.     - Restarted steroid course.     - 02 sat 94% on 4L NC    - Outpatient pulmonary follow-up        # Pneumonia due to 2019 novel coronavirus    - S/P azithromycin/hydroxychloroquine, tocilizumab, steroids    - Continue Prednisone taper    - Tylenol PRN for fever    - Robitussin PRN for cough        # Thrombocytopenia    - No evidence of mucosal bleeding or bruising.     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    - HIT antibody negative, CORIN negative    - S/p B12 1000mcg x1 on 4/21 per heme.        # Paroxysmal atrial fibrillation    - C/w Toprol 25 mg PO daily    - switched from lovenox to eliquis 5mg q12.         # Hypertension    - Toprol 25 mg PO daily        # Benign prostatic hyperplasia without lower urinary tract symptoms    - c/w home tamsulosin 0.4 mg.        New medications on discharge:    Metoprolol XR 25mg PO daily    Eliquis 5mg PO BID         Attending Addendum: Patient seen and examined. Feeling well today. O2 decreased to 3LNC and saturating >92%. Pt to receive home O2 while he continues to recover from COVID PNA. Patient is stable for discharge home with home O2 and outpatient follow up (Dr. Rodriguez for pulm). Mr Bo is an 79 yo M h/o BPH and HLD who presented 4/2 with fever x 3 days. He initiated azithromycin and plaquenil treatment via his outpatient PCP (Dr. Nunez), but had increased work of breathing and desatting. He was sent to ED where he was Intubated on admission, extubated on 4/4. s/p Plaquenil/azithromycin, Toci and steroid course. Weaned from 15L NRB to 4L NC. Course complicated by afib with RVR, now rate controlled on toprol and on eliquis 5mg BID. Patient has thrombocytopenia most likely 2/2 tocilizumab. Heme onc recommended 1000mcg B12 x1. He is stable without respiratory distress, 02 sat 94% on 4L NC.  He will be discharged home with home 02.         # Acute respiratory failure with hypoxia    - Acute hypoxic respiratory failure 2/2 COVID-19 infection.     - Intubated on arrival. Extubated 4/4.     - S/p azithromycin/plaquenil, steroid course, tocilizumab.     - Restarted steroid course.     - 02 sat 94% on 4L NC    - Outpatient pulmonary follow-up        # Pneumonia due to 2019 novel coronavirus    - S/P azithromycin/hydroxychloroquine, tocilizumab, steroids    - Continue Prednisone taper    - Tylenol PRN for fever    - Robitussin PRN for cough        # Thrombocytopenia    - No evidence of mucosal bleeding or bruising.     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    - HIT antibody negative, CORIN negative    - S/p B12 1000mcg x1 on 4/21 per heme.        # Paroxysmal atrial fibrillation    - C/w Toprol 25 mg PO daily    - switched from lovenox to eliquis 5mg q12    - Outpatient EP monitoring         # Hypertension    - Toprol 25 mg PO daily        # Benign prostatic hyperplasia without lower urinary tract symptoms    - c/w home tamsulosin 0.4 mg.        New medications on discharge:    Metoprolol XR 25mg PO daily    Eliquis 5mg PO BID         Attending Addendum: Patient seen and examined. Feeling well today. O2 decreased to 3LNC and saturating >92%. Pt to receive home O2 while he continues to recover from COVID PNA. Patient is stable for discharge home with home O2 and outpatient follow up (Dr. Rodriguez for pulm). Mr Bo is an 81 yo M h/o BPH and HLD who presented 4/2 with fever x 3 days. He initiated azithromycin and plaquenil treatment via his outpatient PCP (Dr. Nunez), but had increased work of breathing and desatting. He was sent to ED where he was Intubated on admission, extubated on 4/4. s/p Plaquenil/azithromycin, Toci and steroid course. Weaned from 15L NRB to 4L NC. Course complicated by afib with RVR, now rate controlled on toprol and on eliquis 5mg BID. Patient has thrombocytopenia most likely 2/2 tocilizumab. Heme onc recommended 1000mcg B12 x1. He is stable without respiratory distress, 02 sat 94% on 4L NC.  He will be discharged home with home 02.         # Acute respiratory failure with hypoxia    - Acute hypoxic respiratory failure 2/2 COVID-19 infection.     - Intubated on arrival. Extubated 4/4.     - S/p azithromycin/plaquenil, steroid course, tocilizumab.     - Restarted steroid course.     - 02 sat 94% on 4L NC    - Outpatient pulmonary follow-up        # Pneumonia due to 2019 novel coronavirus    - S/P azithromycin/hydroxychloroquine, tocilizumab, steroids    - Continue Prednisone taper    - Tylenol PRN for fever    - Robitussin PRN for cough        # Thrombocytopenia    - No evidence of mucosal bleeding or bruising.     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    - HIT antibody negative, CORIN negative    - S/p B12 1000mcg x1 on 4/21 per heme.        # Paroxysmal atrial fibrillation    - C/w Toprol 25 mg PO daily    - switched from lovenox to eliquis 5mg q12    - Outpatient EP monitoring         # Hypertension    - Toprol 25 mg PO daily        # Benign prostatic hyperplasia without lower urinary tract symptoms    - c/w home tamsulosin 0.4 mg.        New medications on discharge:    Metoprolol XR 25mg PO daily    Eliquis 5mg PO BID         Attending Addendum: Patient seen and examined. Feeling well today. O2 decreased to 3LNC and saturating >92%. Pt to receive home O2 while he continues to recover from COVID PNA. Patient is stable for discharge home with home O2 and outpatient follow up (Dr. Rodriguez for pulm). Patient will have outpatient monitoring with EP.

## 2020-04-23 NOTE — PROGRESS NOTE ADULT - SUBJECTIVE AND OBJECTIVE BOX
PULMONARY CONSULT SERVICE FOLLOW-UP NOTE    INTERVAL HPI:  Reviewed chart and overnight events; patient seen and examined at bedside.    MEDICATIONS:  Pulmonary:  ALBUTerol    90 MICROgram(s) HFA Inhaler 2 Puff(s) Inhalation every 6 hours  benzonatate 100 milliGRAM(s) Oral every 8 hours  guaiFENesin   Syrup  (Sugar-Free) 200 milliGRAM(s) Oral every 6 hours PRN    Antimicrobials:    Anticoagulants:  apixaban 5 milliGRAM(s) Oral every 12 hours    Cardiac:  metoprolol succinate ER 25 milliGRAM(s) Oral daily  tamsulosin 0.4 milliGRAM(s) Oral at bedtime      Allergies    No Known Allergies    Intolerances        Vital Signs Last 24 Hrs  T(C): 36.4 (23 Apr 2020 14:57), Max: 36.9 (23 Apr 2020 08:30)  T(F): 97.6 (23 Apr 2020 14:57), Max: 98.4 (23 Apr 2020 08:30)  HR: 78 (23 Apr 2020 14:57) (61 - 78)  BP: 114/63 (23 Apr 2020 14:57) (114/63 - 130/76)  BP(mean): --  RR: 20 (23 Apr 2020 14:57) (18 - 20)  SpO2: 94% (23 Apr 2020 14:57) (92% - 94%)    04-22 @ 07:01 - 04-23 @ 07:00  --------------------------------------------------------  IN: 0 mL / OUT: 900 mL / NET: -900 mL    04-23 @ 07:01  -  04-23 @ 16:06  --------------------------------------------------------  IN: 360 mL / OUT: 300 mL / NET: 60 mL          PHYSICAL EXAM:  Constitutional: WDWN  HEENT: NC/AT; PERRL, anicteric sclera; MMM  Neck: supple  Cardiovascular: +S1/S2, RRR  Respiratory: CTA B/L; no W/R/R  Gastrointestinal: soft, NT/ND; +BSx4  Extremities: WWP; no edema, clubbing or cyanosis  Vascular: 2+ radial, DP/PT pulses B/L  Neurological: AAOx3; no focal deficits    LABS:      CBC Full  -  ( 23 Apr 2020 08:16 )  WBC Count : 6.85 K/uL  RBC Count : 4.01 M/uL  Hemoglobin : 11.6 g/dL  Hematocrit : 36.2 %  Platelet Count - Automated : 88 K/uL  Mean Cell Volume : 90.3 fl  Mean Cell Hemoglobin : 28.9 pg  Mean Cell Hemoglobin Concentration : 32.0 gm/dL  Auto Neutrophil # : 5.79 K/uL  Auto Lymphocyte # : 0.44 K/uL  Auto Monocyte # : 0.57 K/uL  Auto Eosinophil # : 0.01 K/uL  Auto Basophil # : 0.00 K/uL  Auto Neutrophil % : 84.6 %  Auto Lymphocyte % : 6.4 %  Auto Monocyte % : 8.3 %  Auto Eosinophil % : 0.1 %  Auto Basophil % : 0.0 %    04-23    139  |  102  |  17  ----------------------------<  110<H>  4.0   |  26  |  0.63    Ca    8.7      23 Apr 2020 08:16  Phos  2.0     04-23  Mg     2.0     04-23    TPro  6.5  /  Alb  2.9<L>  /  TBili  0.4  /  DBili  x   /  AST  95<H>  /  ALT  208<H>  /  AlkPhos  87  04-23                      RADIOLOGY & ADDITIONAL STUDIES: PULMONARY CONSULT SERVICE FOLLOW-UP NOTE    INTERVAL HPI:  Reviewed chart and overnight events; patient seen and examined at bedside.    Patient currently feels better. Improving and now on Nasal cannula at 4-6L.     MEDICATIONS:  Pulmonary:  ALBUTerol    90 MICROgram(s) HFA Inhaler 2 Puff(s) Inhalation every 6 hours  benzonatate 100 milliGRAM(s) Oral every 8 hours  guaiFENesin   Syrup  (Sugar-Free) 200 milliGRAM(s) Oral every 6 hours PRN    Antimicrobials:    Anticoagulants:  apixaban 5 milliGRAM(s) Oral every 12 hours    Cardiac:  metoprolol succinate ER 25 milliGRAM(s) Oral daily  tamsulosin 0.4 milliGRAM(s) Oral at bedtime      Allergies    No Known Allergies    Intolerances    Vital Signs Last 24 Hrs  T(C): 36.4 (23 Apr 2020 14:57), Max: 36.9 (23 Apr 2020 08:30)  T(F): 97.6 (23 Apr 2020 14:57), Max: 98.4 (23 Apr 2020 08:30)  HR: 78 (23 Apr 2020 14:57) (61 - 78)  BP: 114/63 (23 Apr 2020 14:57) (114/63 - 130/76)  BP(mean): --  RR: 20 (23 Apr 2020 14:57) (18 - 20)  SpO2: 94% (23 Apr 2020 14:57) (92% - 94%)    04-22 @ 07:01  -  04-23 @ 07:00  --------------------------------------------------------  IN: 0 mL / OUT: 900 mL / NET: -900 mL    04-23 @ 07:01  -  04-23 @ 16:06  --------------------------------------------------------  IN: 360 mL / OUT: 300 mL / NET: 60 mL      PHYSICAL EXAM:  Constitutional: WDWN man appears younger than stated age, sitting comfortably in chair  Head: NC/AT  EENT: PERRL, anicteric sclera; oropharynx clear, MMM  Neck: supple, no appreciable JVD  Respiratory: scattered bilateral crackles extending from bases to mid-lung fields; lungs otherwise clear;  Cardiovascular: +S1/S2, RRR  Gastrointestinal: soft, NT/ND  Extremities: WWP; no edema, clubbing or cyanosis  Vascular: 2+ radial pulses B/L  Neurological: awake and alert; conversive, follows commands    LABS:  CBC Full  -  ( 23 Apr 2020 08:16 )  WBC Count : 6.85 K/uL  RBC Count : 4.01 M/uL  Hemoglobin : 11.6 g/dL  Hematocrit : 36.2 %  Platelet Count - Automated : 88 K/uL  Mean Cell Volume : 90.3 fl  Mean Cell Hemoglobin : 28.9 pg  Mean Cell Hemoglobin Concentration : 32.0 gm/dL  Auto Neutrophil # : 5.79 K/uL  Auto Lymphocyte # : 0.44 K/uL  Auto Monocyte # : 0.57 K/uL  Auto Eosinophil # : 0.01 K/uL  Auto Basophil # : 0.00 K/uL  Auto Neutrophil % : 84.6 %  Auto Lymphocyte % : 6.4 %  Auto Monocyte % : 8.3 %  Auto Eosinophil % : 0.1 %  Auto Basophil % : 0.0 %    04-23    139  |  102  |  17  ----------------------------<  110<H>  4.0   |  26  |  0.63    Ca    8.7      23 Apr 2020 08:16  Phos  2.0     04-23  Mg     2.0     04-23    TPro  6.5  /  Alb  2.9<L>  /  TBili  0.4  /  DBili  x   /  AST  95<H>  /  ALT  208<H>  /  AlkPhos  87  04-23    RADIOLOGY & ADDITIONAL STUDIES:  Reviewed.

## 2020-04-23 NOTE — PROGRESS NOTE ADULT - PROBLEM SELECTOR PLAN 1
He was intubated on 4/4 and extubated two days later. Since then, most of his improvement has been with respect to other viral sx from COVID-19 though he has been able to be more mobile/active in the hospital despite dependence on NRB. He has been out of bed to chair, and is interested in increasing activity to ambulation within the limitations of hospital room. Was started on high dose steroids; though it is not clear if there is a clear indication for this in COVID-19 infection, particularly persistent respiratory failure nearly 3 weeks beyond initial infection.     Recommendations:  - cont best supportive care in COVID-19 infection  - Taper steroids to 50mg. Plan for short taper, can aim for 14 days. Unclear if this is of benefit/utility , especially in this late stage of disease.   - cont full AC for his afib.

## 2020-04-23 NOTE — PROGRESS NOTE ADULT - PROBLEM SELECTOR PROBLEM 1
Acute hypoxemic respiratory failure
Acute respiratory failure with hypoxia

## 2020-04-23 NOTE — PROGRESS NOTE ADULT - ATTENDING COMMENTS
Pt is an 81 y/o man c BPH, HLP, acute hypoxemic respiratory failure from COVID s/p intubation/ extubation on tele floors.    Pt noted to be slightly tachypneic and progressively hypoxemic.    Pt satting 82% on 100% NRB and was going to be upgraded to HFNC in a negative pressure room.    Pt not able to tolerate proning, but was able to tolerate proning and sat --> 97%.      CXR re-shot and infiltrates have worsened    - give lasix for non cardiogenic pulm edema  - prone pt  - cont steroids for worsened infiltrates
Oxygenation improving. Would cut prednisone to 50mg if discharging and then plan on medium or quicker taper. Can follow in our practice [phone].
Discussed case with 5 [facility] residents and interns. Agree with plan as detailed above.    As per COVID crisis protocol, intern exam was used to reduce exposure since patient was stable.
80 year old male patient is being treated for acute respiratory failure secondary to COVID infection - patient is s/p extubation and ICU admission currently regional medical floor on non-breather. Patient is SOB and anxious - closely monitoring his 02 sat  1. Acute Respiratory Failure  Non-breather 15 lt  attempt to wean  2. COVID Pneumonia  s/p plaquenil and azithro  f/u inflammatory markers as CRP is uptrending if O2 requirement went up - restart solumedrol and will reach out to pulm to evaluate for second round of toci  3. Anxiety  Reassurance given  Psychosocial support is provided
Patient seen and examined. Discussed case with housestaff. Agree with above assessment and plan with addendum:    Patient feeling better today. Able to titrate down on NRB to about 10L. Discussion with Pulm (unofficial) yesterday about why still requiring high O2. CT may represent  and steroids started. Still COVID + on repeat swab, unable to do bronch. Progressing clinically.     COVID PNA/: Pt still with COVID but now appears to be getting better. Inflammatory markers are improving, O2 sat and decreasing O2 requirements, getting better.   -Continue with Prednisone 80mg daily.  -Will likley need longterm, Will consult Pulm.  -If on long term steroids, will need PCP PPx.   -Continue to wean down on O2.    Thrombocytopenia: Improving.   -Still awaiting CORIN.  -May have been due to Toci.     pAF: Regular. Rate controlled.  -Continue with BB and Lovenox.     HTN: Offf midodrine.  -Will monitor.     BPH: Continue Tamsulosin    PPx: Lovenox and Pepcid
Patient seen and examined. Discussed case with housestaff. Agree with above assessment and plan with addendum:    Patient seen on 6LNC and satting 92-97%. Turned down to 4LNC with SpO2 92-3%.   Will try to ambulate on NC later to check sats. Patient improving.   Will discuss continued steroids with pulm and whether needs taper.   Patient may be ready for DC within next day with home O2.
Patient seen and examined. Patient discussed with resident team. Agree with above assessment and plan with addendums below.    COVID+/PNA and acute hypoxic respiratory failure, requriing NRB.  -S/P Plaquenil, Azithromycin, Solumedrol x5 days and Toci.  -With uptrending biomarkers, Solumedrol 40mg Q12 IVP started again.  -Conitnue with NRB and will try to wean throughout day to ventimask vs NC.   -Awake proning as needed.     Thrombocytopenia: Unclear cause.   -HIT negative. Awaiting CORIN.   -H/O consulted. Appreciate recs.   -No tx now.     Paroxysmal AF: Currently rate controlled.  -Continue Toprol XL 25mg.  -Enoxaparin for AC.  Likely switch to Xarelto/Eliquis on DC.     HTN: On Midodrine.   -Titrate down.   -MAP >60 goal.     PPX: Lovenox and Pepcid.
Patient seen and examined. Discussed case with housestaff. Agree with above assessment and plan with addendum:    Patient seen lying in bed. Overnight events noted. Patient currently on 6LNC and satting 97% and now turned down to 4LNC and still saturating >90%. Patient did not sleep well due to SOB but feeling better from respiratory standpoint. Will continue to wean patient as tolerated  Awaiting Pulm recs regarding steroids.
80 year old male patient is being treated for acute respiratory failure secondary to COVID infection - patient is s/p extubation and ICU admission currently on non-breather.   1. Acute Respiratory Failure  Non-breather 15 lt  attempt to wean  2. COVID Pneumonia  s/p plaquenil and azithro  f/u inflammatory markers as CRP is uptrending if O2 requirement go up consider restarting steroid therapy

## 2020-04-24 ENCOUNTER — TRANSCRIPTION ENCOUNTER (OUTPATIENT)
Age: 80
End: 2020-04-24

## 2020-04-24 VITALS
DIASTOLIC BLOOD PRESSURE: 72 MMHG | TEMPERATURE: 98 F | SYSTOLIC BLOOD PRESSURE: 102 MMHG | HEART RATE: 60 BPM | RESPIRATION RATE: 20 BRPM | OXYGEN SATURATION: 94 %

## 2020-04-24 PROBLEM — N40.0 BENIGN PROSTATIC HYPERPLASIA WITHOUT LOWER URINARY TRACT SYMPTOMS: Chronic | Status: ACTIVE | Noted: 2020-04-04

## 2020-04-24 PROBLEM — E78.5 HYPERLIPIDEMIA, UNSPECIFIED: Chronic | Status: ACTIVE | Noted: 2020-04-04

## 2020-04-24 PROBLEM — I10 ESSENTIAL (PRIMARY) HYPERTENSION: Chronic | Status: ACTIVE | Noted: 2020-04-04

## 2020-04-24 PROBLEM — Z00.00 ENCOUNTER FOR PREVENTIVE HEALTH EXAMINATION: Status: ACTIVE | Noted: 2020-04-24

## 2020-04-24 LAB
ALBUMIN SERPL ELPH-MCNC: 2.8 G/DL — LOW (ref 3.3–5)
ALP SERPL-CCNC: 72 U/L — SIGNIFICANT CHANGE UP (ref 40–120)
ALT FLD-CCNC: 140 U/L — HIGH (ref 10–45)
ANION GAP SERPL CALC-SCNC: 10 MMOL/L — SIGNIFICANT CHANGE UP (ref 5–17)
AST SERPL-CCNC: 34 U/L — SIGNIFICANT CHANGE UP (ref 10–40)
BASOPHILS # BLD AUTO: 0 K/UL — SIGNIFICANT CHANGE UP (ref 0–0.2)
BASOPHILS NFR BLD AUTO: 0 % — SIGNIFICANT CHANGE UP (ref 0–2)
BILIRUB SERPL-MCNC: 0.3 MG/DL — SIGNIFICANT CHANGE UP (ref 0.2–1.2)
BUN SERPL-MCNC: 18 MG/DL — SIGNIFICANT CHANGE UP (ref 7–23)
CALCIUM SERPL-MCNC: 8.7 MG/DL — SIGNIFICANT CHANGE UP (ref 8.4–10.5)
CHLORIDE SERPL-SCNC: 103 MMOL/L — SIGNIFICANT CHANGE UP (ref 96–108)
CO2 SERPL-SCNC: 28 MMOL/L — SIGNIFICANT CHANGE UP (ref 22–31)
CREAT SERPL-MCNC: 0.73 MG/DL — SIGNIFICANT CHANGE UP (ref 0.5–1.3)
CRP SERPL-MCNC: 0.85 MG/DL — HIGH (ref 0–0.4)
D DIMER BLD IA.RAPID-MCNC: 155 NG/ML DDU — SIGNIFICANT CHANGE UP
EOSINOPHIL # BLD AUTO: 0.07 K/UL — SIGNIFICANT CHANGE UP (ref 0–0.5)
EOSINOPHIL NFR BLD AUTO: 1.2 % — SIGNIFICANT CHANGE UP (ref 0–6)
FERRITIN SERPL-MCNC: 283 NG/ML — SIGNIFICANT CHANGE UP (ref 30–400)
GLUCOSE SERPL-MCNC: 88 MG/DL — SIGNIFICANT CHANGE UP (ref 70–99)
HCT VFR BLD CALC: 36.1 % — LOW (ref 39–50)
HGB BLD-MCNC: 11.6 G/DL — LOW (ref 13–17)
IMM GRANULOCYTES NFR BLD AUTO: 1.2 % — SIGNIFICANT CHANGE UP (ref 0–1.5)
LYMPHOCYTES # BLD AUTO: 0.74 K/UL — LOW (ref 1–3.3)
LYMPHOCYTES # BLD AUTO: 12.6 % — LOW (ref 13–44)
MAGNESIUM SERPL-MCNC: 1.8 MG/DL — SIGNIFICANT CHANGE UP (ref 1.6–2.6)
MCHC RBC-ENTMCNC: 29.1 PG — SIGNIFICANT CHANGE UP (ref 27–34)
MCHC RBC-ENTMCNC: 32.1 GM/DL — SIGNIFICANT CHANGE UP (ref 32–36)
MCV RBC AUTO: 90.5 FL — SIGNIFICANT CHANGE UP (ref 80–100)
MONOCYTES # BLD AUTO: 0.86 K/UL — SIGNIFICANT CHANGE UP (ref 0–0.9)
MONOCYTES NFR BLD AUTO: 14.6 % — HIGH (ref 2–14)
NEUTROPHILS # BLD AUTO: 4.14 K/UL — SIGNIFICANT CHANGE UP (ref 1.8–7.4)
NEUTROPHILS NFR BLD AUTO: 70.4 % — SIGNIFICANT CHANGE UP (ref 43–77)
NRBC # BLD: 0 /100 WBCS — SIGNIFICANT CHANGE UP (ref 0–0)
PHOSPHATE SERPL-MCNC: 2.6 MG/DL — SIGNIFICANT CHANGE UP (ref 2.5–4.5)
PLATELET # BLD AUTO: 104 K/UL — LOW (ref 150–400)
POTASSIUM SERPL-MCNC: 3.7 MMOL/L — SIGNIFICANT CHANGE UP (ref 3.5–5.3)
POTASSIUM SERPL-SCNC: 3.7 MMOL/L — SIGNIFICANT CHANGE UP (ref 3.5–5.3)
PROT SERPL-MCNC: 6.1 G/DL — SIGNIFICANT CHANGE UP (ref 6–8.3)
RBC # BLD: 3.99 M/UL — LOW (ref 4.2–5.8)
RBC # FLD: 14.7 % — HIGH (ref 10.3–14.5)
SODIUM SERPL-SCNC: 141 MMOL/L — SIGNIFICANT CHANGE UP (ref 135–145)
WBC # BLD: 5.88 K/UL — SIGNIFICANT CHANGE UP (ref 3.8–10.5)
WBC # FLD AUTO: 5.88 K/UL — SIGNIFICANT CHANGE UP (ref 3.8–10.5)

## 2020-04-24 PROCEDURE — 86022 PLATELET ANTIBODIES: CPT

## 2020-04-24 PROCEDURE — 85379 FIBRIN DEGRADATION QUANT: CPT

## 2020-04-24 PROCEDURE — 82955 ASSAY OF G6PD ENZYME: CPT

## 2020-04-24 PROCEDURE — 80074 ACUTE HEPATITIS PANEL: CPT

## 2020-04-24 PROCEDURE — 80048 BASIC METABOLIC PNL TOTAL CA: CPT

## 2020-04-24 PROCEDURE — 87389 HIV-1 AG W/HIV-1&-2 AB AG IA: CPT

## 2020-04-24 PROCEDURE — 85610 PROTHROMBIN TIME: CPT

## 2020-04-24 PROCEDURE — 81001 URINALYSIS AUTO W/SCOPE: CPT

## 2020-04-24 PROCEDURE — 94640 AIRWAY INHALATION TREATMENT: CPT

## 2020-04-24 PROCEDURE — 96365 THER/PROPH/DIAG IV INF INIT: CPT | Mod: XU

## 2020-04-24 PROCEDURE — 87635 SARS-COV-2 COVID-19 AMP PRB: CPT

## 2020-04-24 PROCEDURE — 97530 THERAPEUTIC ACTIVITIES: CPT

## 2020-04-24 PROCEDURE — 87086 URINE CULTURE/COLONY COUNT: CPT

## 2020-04-24 PROCEDURE — 93005 ELECTROCARDIOGRAM TRACING: CPT | Mod: XU

## 2020-04-24 PROCEDURE — 82962 GLUCOSE BLOOD TEST: CPT

## 2020-04-24 PROCEDURE — 85025 COMPLETE CBC W/AUTO DIFF WBC: CPT

## 2020-04-24 PROCEDURE — 86038 ANTINUCLEAR ANTIBODIES: CPT

## 2020-04-24 PROCEDURE — 80053 COMPREHEN METABOLIC PANEL: CPT

## 2020-04-24 PROCEDURE — 86140 C-REACTIVE PROTEIN: CPT

## 2020-04-24 PROCEDURE — 99291 CRITICAL CARE FIRST HOUR: CPT | Mod: 25

## 2020-04-24 PROCEDURE — 86880 COOMBS TEST DIRECT: CPT

## 2020-04-24 PROCEDURE — 96375 TX/PRO/DX INJ NEW DRUG ADDON: CPT | Mod: XU

## 2020-04-24 PROCEDURE — 71045 X-RAY EXAM CHEST 1 VIEW: CPT

## 2020-04-24 PROCEDURE — 83520 IMMUNOASSAY QUANT NOS NONAB: CPT

## 2020-04-24 PROCEDURE — 83010 ASSAY OF HAPTOGLOBIN QUANT: CPT

## 2020-04-24 PROCEDURE — 31500 INSERT EMERGENCY AIRWAY: CPT

## 2020-04-24 PROCEDURE — 71250 CT THORAX DX C-: CPT

## 2020-04-24 PROCEDURE — 71045 X-RAY EXAM CHEST 1 VIEW: CPT | Mod: 26

## 2020-04-24 PROCEDURE — 82746 ASSAY OF FOLIC ACID SERUM: CPT

## 2020-04-24 PROCEDURE — 85027 COMPLETE CBC AUTOMATED: CPT

## 2020-04-24 PROCEDURE — 87040 BLOOD CULTURE FOR BACTERIA: CPT

## 2020-04-24 PROCEDURE — 82248 BILIRUBIN DIRECT: CPT

## 2020-04-24 PROCEDURE — 97116 GAIT TRAINING THERAPY: CPT

## 2020-04-24 PROCEDURE — 82803 BLOOD GASES ANY COMBINATION: CPT

## 2020-04-24 PROCEDURE — 83036 HEMOGLOBIN GLYCOSYLATED A1C: CPT

## 2020-04-24 PROCEDURE — 85730 THROMBOPLASTIN TIME PARTIAL: CPT

## 2020-04-24 PROCEDURE — 86359 T CELLS TOTAL COUNT: CPT

## 2020-04-24 PROCEDURE — 36415 COLL VENOUS BLD VENIPUNCTURE: CPT

## 2020-04-24 PROCEDURE — 84100 ASSAY OF PHOSPHORUS: CPT

## 2020-04-24 PROCEDURE — 83735 ASSAY OF MAGNESIUM: CPT

## 2020-04-24 PROCEDURE — 82607 VITAMIN B-12: CPT

## 2020-04-24 PROCEDURE — 83615 LACTATE (LD) (LDH) ENZYME: CPT

## 2020-04-24 PROCEDURE — 84145 PROCALCITONIN (PCT): CPT

## 2020-04-24 PROCEDURE — 82728 ASSAY OF FERRITIN: CPT

## 2020-04-24 PROCEDURE — 99233 SBSQ HOSP IP/OBS HIGH 50: CPT | Mod: CS,GC

## 2020-04-24 PROCEDURE — 96368 THER/DIAG CONCURRENT INF: CPT | Mod: XU

## 2020-04-24 PROCEDURE — 97161 PT EVAL LOW COMPLEX 20 MIN: CPT

## 2020-04-24 RX ORDER — METOPROLOL TARTRATE 50 MG
1 TABLET ORAL
Qty: 30 | Refills: 0
Start: 2020-04-24 | End: 2020-05-23

## 2020-04-24 RX ORDER — ASPIRIN/CALCIUM CARB/MAGNESIUM 324 MG
1 TABLET ORAL
Qty: 0 | Refills: 0 | DISCHARGE

## 2020-04-24 RX ORDER — TAMSULOSIN HYDROCHLORIDE 0.4 MG/1
1 CAPSULE ORAL
Qty: 0 | Refills: 0 | DISCHARGE

## 2020-04-24 RX ORDER — TAMSULOSIN HYDROCHLORIDE 0.4 MG/1
1 CAPSULE ORAL
Qty: 30 | Refills: 0
Start: 2020-04-24 | End: 2020-05-23

## 2020-04-24 RX ORDER — ASPIRIN/CALCIUM CARB/MAGNESIUM 324 MG
1 TABLET ORAL
Qty: 30 | Refills: 0
Start: 2020-04-24 | End: 2020-05-23

## 2020-04-24 RX ORDER — METOPROLOL TARTRATE 50 MG
12.5 TABLET ORAL
Qty: 0 | Refills: 0 | DISCHARGE

## 2020-04-24 RX ORDER — ROSUVASTATIN CALCIUM 5 MG/1
1 TABLET ORAL
Qty: 0 | Refills: 0 | DISCHARGE

## 2020-04-24 RX ORDER — ROSUVASTATIN CALCIUM 5 MG/1
1 TABLET ORAL
Qty: 30 | Refills: 0
Start: 2020-04-24 | End: 2020-05-23

## 2020-04-24 RX ORDER — APIXABAN 2.5 MG/1
1 TABLET, FILM COATED ORAL
Qty: 60 | Refills: 0
Start: 2020-04-24 | End: 2020-05-23

## 2020-04-24 RX ADMIN — ALBUTEROL 2 PUFF(S): 90 AEROSOL, METERED ORAL at 05:11

## 2020-04-24 RX ADMIN — APIXABAN 5 MILLIGRAM(S): 2.5 TABLET, FILM COATED ORAL at 04:14

## 2020-04-24 RX ADMIN — Medication 100 MILLIGRAM(S): at 04:12

## 2020-04-24 RX ADMIN — FAMOTIDINE 20 MILLIGRAM(S): 10 INJECTION INTRAVENOUS at 04:12

## 2020-04-24 RX ADMIN — Medication 100 MILLIGRAM(S): at 14:35

## 2020-04-24 RX ADMIN — Medication 25 MILLIGRAM(S): at 04:12

## 2020-04-24 RX ADMIN — Medication 50 MILLIGRAM(S): at 04:12

## 2020-04-24 RX ADMIN — Medication 200 MILLIGRAM(S): at 10:09

## 2020-04-24 NOTE — DISCHARGE NOTE NURSING/CASE MANAGEMENT/SOCIAL WORK - PATIENT PORTAL LINK FT
You can access the FollowMyHealth Patient Portal offered by [facility] by registering at the following website: [email or web address]. By joining [facility]’s FollowMyHealth portal, you will also be able to view your health information using other applications (apps) compatible with our system.

## 2020-05-07 DIAGNOSIS — N40.0 BENIGN PROSTATIC HYPERPLASIA WITHOUT LOWER URINARY TRACT SYMPTOMS: ICD-10-CM

## 2020-05-07 DIAGNOSIS — U07.1 COVID-19: ICD-10-CM

## 2020-05-07 DIAGNOSIS — I48.0 PAROXYSMAL ATRIAL FIBRILLATION: ICD-10-CM

## 2020-05-07 DIAGNOSIS — I10 ESSENTIAL (PRIMARY) HYPERTENSION: ICD-10-CM

## 2020-05-07 DIAGNOSIS — E78.5 HYPERLIPIDEMIA, UNSPECIFIED: ICD-10-CM

## 2020-05-07 DIAGNOSIS — A41.89 OTHER SPECIFIED SEPSIS: ICD-10-CM

## 2020-05-07 DIAGNOSIS — N17.9 ACUTE KIDNEY FAILURE, UNSPECIFIED: ICD-10-CM

## 2020-05-07 DIAGNOSIS — J96.01 ACUTE RESPIRATORY FAILURE WITH HYPOXIA: ICD-10-CM

## 2020-05-07 DIAGNOSIS — Z78.1 PHYSICAL RESTRAINT STATUS: ICD-10-CM

## 2020-05-07 DIAGNOSIS — J12.89 OTHER VIRAL PNEUMONIA: ICD-10-CM

## 2020-05-11 NOTE — REASON FOR VISIT
[Follow-Up - From Hospitalization] : a follow-up visit after a recent hospitalization [TextBox_44] : COVID-19

## 2020-05-11 NOTE — PROCEDURE
[FreeTextEntry1] : EXAM: CT CHEST \par \par PROCEDURE DATE: 04/13/2020 \par \par \par \par INTERPRETATION: CT of the CHEST without intravenous contrast dated \par 4/13/2020 7:15 AM \par \par INDICATION: Fibrosis \par \par TECHNIQUE: CT of the chest was performed without intravenous contrast. \par Intravenous contrast was not used Axial and coronal and sagittal images were \par produced and reviewed. \par \par PRIOR STUDIES: None. \par \par FINDINGS: The heart is normal in size. No pericardial effusion is seen. \par Evaluation of the vasculature is limited without intravenous contrast, but \par there is mild aneurysmal dilatation of the ascending aorta measuring up to \par 4.3 cm. The aortic root shows mild aneurysmal dilatation measuring 4.2 cm \par diameter. The descending thoracic aorta is mildly ectatic with the proximal \par aspect measuring 3.5 cm diameter. . Evaluation for adenopathy is limited \par without intravenous contrast. Within that limitation, no mediastinal or \par hilar or axillary lymphadenopathy is seen. The main pulmonary artery \par measures 2.8 cm. No pulmonary arterial hypertension. Mild bilateral \par gynecomastia. Coronary artery calcification. \par \par No pleural effusions are seen. Evaluation of the pulmonary parenchyma \par demonstrates 4 mm solid nodule right upper lobe image 177 series 4. There is \par extensive peripheral and peribroncho vascular consolidation present in the \par bilateral upper lobes, bilateral lower lobes and to lesser extent the \par lateral segment right middle lobe and the lingula. Extreme lung bases and \par anterior lungs are relatively spared. No evidence of traction \par bronchiectasis. No visualized paraseptal or centrilobular emphysema. 1.4 cm \par air-filled cyst in the left lower lobe anterior basal segment.Cannot rule \par out complicated by DAD/acute interstitial pneumonia. \par \par \par Limited evaluation of the upper abdomen demonstrates no abnormality. \par \par Evaluation of the osseous structures demonstrates degenerative changes. \par Hemangioma L1 vertebral body. Slight wedging of T8 vertebral body of \par indeterminate age. \par \par \par IMPRESSION: Extensive bilateral peripheral and peribroncho vascular \par consolidation. No evidence of traction bronchiectasis. No fibrosis \par identified. \par \par \par \par \par \par \par \par Thank you for the opportunity to participate in the care of this patient. \par \par \par \par DANNI ZABALA M.D., ATTENDING RADIOLOGIST \par This document has been electronically signed. Apr 13 2020 4:31PM \par \par

## 2020-05-11 NOTE — HISTORY OF PRESENT ILLNESS
[TextBox_4] : 80 year old male with PMHx of  BPH & HLD with recent hospitalization at [facility] from 4/2/20-/4/24/20 with COVID-19 pneumonia. Patient initiated treatmetn with azithromycin & Plaquenil outpatient with PCP but work of breathing increased & was later sent to [facility] ED where he was intubated on admission (extubated 4/4). Completed course of azithro, plaquenil, Toci & steroids. Weaned from 15L NRB to 4L NC. Developed afib and started on Eliquis 5 mg BID  & Toprol 25 mg. Discharged home with home oxygen. Of note, did develop thrombocytopenia during hospitalization likely related to Toci and given B12 100 mg X 1 per heme recs. \par \par Today, patient is feeling

## 2020-05-13 ENCOUNTER — APPOINTMENT (OUTPATIENT)
Dept: PULMONOLOGY | Facility: CLINIC | Age: 80
End: 2020-05-13

## 2021-11-15 ENCOUNTER — TRANSCRIPTION ENCOUNTER (OUTPATIENT)
Age: 81
End: 2021-11-15

## 2021-11-16 ENCOUNTER — OUTPATIENT (OUTPATIENT)
Dept: OUTPATIENT SERVICES | Facility: HOSPITAL | Age: 81
LOS: 1 days | Discharge: HOME | End: 2021-11-16

## 2021-11-24 ENCOUNTER — TRANSCRIPTION ENCOUNTER (OUTPATIENT)
Age: 81
End: 2021-11-24

## 2022-11-08 NOTE — PROGRESS NOTE ADULT - PROBLEM SELECTOR PLAN 1
· History of right occipital and basal ganglia ischemic stroke on 9/13, presented with L sided weakness and visual defects  · CTA of the head/Neck with contrast showed occlusion of the right PCA P2 segment, with distal branch reperfusion  Critical-greater than 90%-stenosis in the proximal cervical segments of the bilateral internal carotid arteries  Distal cervical and intracranial segments are patent and normal in caliber  · MRI brain wo contrast 9/14 Impression: 1  Focal right thalamic acute to subacute infarct  Additional linear areas of acute to subacute ischemia involving the medial right temporal-occipital region compatible with PCA territory infarcts  Right PCA occlusion noted on CTA  2  Mild chronic microtraumatic ischemic changes      · Echo: EF 60% G1DD  · Other CV risk factors- former tobacco- 20 years, Hypertension  · Continue DAPT and BP meds Acute hypoxic RF 2/2 COVID-19 infection. Patient intubated on arrival. Completed azithromycin and hydroxychloroquine outpatient. Extubated 4/3  - c/w methylprednisolone 40mg IV x5 days (4/2-4/6)   - needs to be proned and sats improve to mid 90s. Patient is still low 80s when on his back  - diurese as needed Acute hypoxic RF 2/2 COVID-19 infection. Patient intubated on arrival. Completed azithromycin and hydroxychloroquine outpatient. Extubated 4/3  - needs to be proned and sats improve to mid 90s. Patient is still low 80s when on his back  - diurese as needed

## 2024-01-02 ENCOUNTER — APPOINTMENT (OUTPATIENT)
Dept: HEART AND VASCULAR | Facility: CLINIC | Age: 84
End: 2024-01-02

## 2024-10-07 NOTE — ED ADULT NURSE NOTE - CAS DISCH BELONGINGS RETURNED
Anesthesia Post-op Note    Patient: Joel Fuller  Procedure(s) Performed: COLONOSCOPY  Anesthesia type: MAC    Vitals Value Taken Time   Temp 36.2 °C (97.2 °F) 10/07/24 0956   Pulse 65 10/07/24 0956   Resp 16 10/07/24 0956   SpO2 96 % 10/07/24 0956   /60 10/07/24 0956         Patient Location: PACU Phase 2  Post-op Vital Signs:stable  Level of Consciousness: participates in exam, answers questions appropriately, awake, alert and oriented  Respiratory Status: spontaneous ventilation  Cardiovascular blood pressure returned to baseline  Hydration: euvolemic  Pain Management: well controlled  Vomiting: none  Nausea: None  Airway Patency:patent  Post-op Assessment: awake, alert, appropriately conversant, or baseline, no complications and patient tolerated procedure well      No notable events documented.                      
Not applicable

## 2024-11-25 NOTE — ED ADULT NURSE NOTE - NS ED NURSE REPORT GIVEN DT
Problem: Chronic Conditions and Co-morbidities  Goal: Patient's chronic conditions and co-morbidity symptoms are monitored and maintained or improved  11/25/2024 1034 by Namrata Ruano, RN  Outcome: Progressing     Problem: Safety - Adult  Goal: Free from fall injury  11/25/2024 1034 by Namrata Ruano, RN  Outcome: Progressing      02-Apr-2020 12:36

## 2025-05-28 NOTE — H&P ADULT - NSICDXPASTSURGICALHX_GEN_ALL_CORE_FT
Extubation    Cuff leak noted: Yes  Stridor present: No     RCP Complete? Yes  Events/Summary/Plan: Pt extubated to 2L NC, tolerating well.          No significant past surgical history